# Patient Record
Sex: MALE | Race: WHITE | Employment: OTHER | URBAN - METROPOLITAN AREA
[De-identification: names, ages, dates, MRNs, and addresses within clinical notes are randomized per-mention and may not be internally consistent; named-entity substitution may affect disease eponyms.]

---

## 2017-01-03 ENCOUNTER — HOME HEALTH ADMISSION (OUTPATIENT)
Dept: HOME HEALTH SERVICES | Facility: HOME HEALTH | Age: 82
End: 2017-01-03
Payer: MEDICARE

## 2017-01-05 ENCOUNTER — HOSPITAL ENCOUNTER (OUTPATIENT)
Dept: LAB | Age: 82
Discharge: HOME | End: 2017-01-05

## 2017-01-05 LAB
INR PPP: 3.2 (ref 0.9–1.2)
PROTHROMBIN TIME: 35.6 SEC (ref 9.6–12)

## 2017-01-05 PROCEDURE — 85610 PROTHROMBIN TIME: CPT | Performed by: INTERNAL MEDICINE

## 2017-01-10 ENCOUNTER — HOME CARE VISIT (OUTPATIENT)
Dept: SCHEDULING | Facility: HOME HEALTH | Age: 82
End: 2017-01-10
Payer: MEDICARE

## 2017-01-10 PROCEDURE — 400013 HH SOC

## 2017-01-10 PROCEDURE — G0299 HHS/HOSPICE OF RN EA 15 MIN: HCPCS

## 2017-01-10 PROCEDURE — 3331090001 HH PPS REVENUE CREDIT

## 2017-01-10 PROCEDURE — 3331090002 HH PPS REVENUE DEBIT

## 2017-01-11 PROCEDURE — 3331090002 HH PPS REVENUE DEBIT

## 2017-01-11 PROCEDURE — 3331090001 HH PPS REVENUE CREDIT

## 2017-01-12 VITALS
TEMPERATURE: 97.6 F | BODY MASS INDEX: 38.8 KG/M2 | OXYGEN SATURATION: 97 % | WEIGHT: 256 LBS | HEART RATE: 100 BPM | HEIGHT: 68 IN | SYSTOLIC BLOOD PRESSURE: 120 MMHG | DIASTOLIC BLOOD PRESSURE: 58 MMHG

## 2017-01-12 PROCEDURE — 3331090002 HH PPS REVENUE DEBIT

## 2017-01-12 PROCEDURE — 3331090001 HH PPS REVENUE CREDIT

## 2017-01-13 ENCOUNTER — PATIENT OUTREACH (OUTPATIENT)
Dept: CASE MANAGEMENT | Age: 82
End: 2017-01-13

## 2017-01-13 ENCOUNTER — HOME CARE VISIT (OUTPATIENT)
Dept: SCHEDULING | Facility: HOME HEALTH | Age: 82
End: 2017-01-13
Payer: MEDICARE

## 2017-01-13 PROCEDURE — G0299 HHS/HOSPICE OF RN EA 15 MIN: HCPCS

## 2017-01-13 PROCEDURE — 3331090002 HH PPS REVENUE DEBIT

## 2017-01-13 PROCEDURE — 3331090001 HH PPS REVENUE CREDIT

## 2017-01-13 NOTE — PROGRESS NOTES
[facility] outreach. Spoke with patient who asked me to speak with his private aid. Private Aide reports she is there with patient daily for 4-8 hours. Aide reported [facility] has visited once this week and is unsure of how often they plan to visit. She gave me a family member's contact information who handles his appointments and medications. Family member is Ms. Donny Gan [phone]. Aide unaware of her full name. [facility] called Ms. Donny Gan unable to reach. Left contact information requesting a return call. This note will not be viewable in [address]

## 2017-01-14 PROCEDURE — 3331090001 HH PPS REVENUE CREDIT

## 2017-01-14 PROCEDURE — 3331090002 HH PPS REVENUE DEBIT

## 2017-01-15 PROCEDURE — 3331090002 HH PPS REVENUE DEBIT

## 2017-01-15 PROCEDURE — 3331090001 HH PPS REVENUE CREDIT

## 2017-01-16 ENCOUNTER — PATIENT OUTREACH (OUTPATIENT)
Dept: CASE MANAGEMENT | Age: 82
End: 2017-01-16

## 2017-01-16 VITALS
DIASTOLIC BLOOD PRESSURE: 60 MMHG | SYSTOLIC BLOOD PRESSURE: 110 MMHG | TEMPERATURE: 96 F | HEART RATE: 91 BPM | OXYGEN SATURATION: 93 %

## 2017-01-16 PROCEDURE — 3331090001 HH PPS REVENUE CREDIT

## 2017-01-16 PROCEDURE — 3331090002 HH PPS REVENUE DEBIT

## 2017-01-16 NOTE — PROGRESS NOTES
Received return call from patient's niece Dahlia Roscoe. [facility] nursing scheduled 2 times weekly. PT/OT scheduled for 1/17/17 for evaluation. Niece reports she purchased his shower chair at a consignment shop and is concerned if it is appropriate or safe for family to assist with bathing. [facility] spoke with [facility] at [address] regarding the shower chair. [place] to inform PT, of [place] family's concern. Follow up appointment scheduled with PCP 2/9/17. Transportation arranged. Patient's niece stated that [facility] nurse is checking PT/INR and reporting results to PCP. Niece to call CCM with any issues/concerns. This note will not be viewable in [address]

## 2017-01-17 ENCOUNTER — HOME CARE VISIT (OUTPATIENT)
Dept: SCHEDULING | Facility: HOME HEALTH | Age: 82
End: 2017-01-17
Payer: MEDICARE

## 2017-01-17 VITALS
HEIGHT: 68 IN | HEART RATE: 92 BPM | DIASTOLIC BLOOD PRESSURE: 71 MMHG | RESPIRATION RATE: 16 BRPM | SYSTOLIC BLOOD PRESSURE: 119 MMHG

## 2017-01-17 PROCEDURE — 3331090002 HH PPS REVENUE DEBIT

## 2017-01-17 PROCEDURE — G0299 HHS/HOSPICE OF RN EA 15 MIN: HCPCS

## 2017-01-17 PROCEDURE — 3331090001 HH PPS REVENUE CREDIT

## 2017-01-17 PROCEDURE — G0151 HHCP-SERV OF PT,EA 15 MIN: HCPCS

## 2017-01-18 ENCOUNTER — HOME CARE VISIT (OUTPATIENT)
Dept: HOME HEALTH SERVICES | Facility: HOME HEALTH | Age: 82
End: 2017-01-18
Payer: MEDICARE

## 2017-01-18 VITALS
TEMPERATURE: 95.6 F | DIASTOLIC BLOOD PRESSURE: 60 MMHG | SYSTOLIC BLOOD PRESSURE: 115 MMHG | RESPIRATION RATE: 18 BRPM | OXYGEN SATURATION: 98 % | HEART RATE: 99 BPM

## 2017-01-18 PROCEDURE — 3331090002 HH PPS REVENUE DEBIT

## 2017-01-18 PROCEDURE — 3331090001 HH PPS REVENUE CREDIT

## 2017-01-19 ENCOUNTER — PATIENT OUTREACH (OUTPATIENT)
Dept: CASE MANAGEMENT | Age: 82
End: 2017-01-19

## 2017-01-19 PROCEDURE — 3331090002 HH PPS REVENUE DEBIT

## 2017-01-19 PROCEDURE — 3331090001 HH PPS REVENUE CREDIT

## 2017-01-19 NOTE — PROGRESS NOTES
CCM follow up call attempted. Unable to reach patient. Left message with niece requesting a return call. This note will not be viewable in [address]

## 2017-01-20 ENCOUNTER — HOME CARE VISIT (OUTPATIENT)
Dept: SCHEDULING | Facility: HOME HEALTH | Age: 82
End: 2017-01-20
Payer: MEDICARE

## 2017-01-20 ENCOUNTER — HOME CARE VISIT (OUTPATIENT)
Dept: HOME HEALTH SERVICES | Facility: HOME HEALTH | Age: 82
End: 2017-01-20
Payer: MEDICARE

## 2017-01-20 VITALS
SYSTOLIC BLOOD PRESSURE: 126 MMHG | TEMPERATURE: 97.8 F | RESPIRATION RATE: 18 BRPM | HEART RATE: 72 BPM | DIASTOLIC BLOOD PRESSURE: 70 MMHG

## 2017-01-20 VITALS
HEART RATE: 98 BPM | DIASTOLIC BLOOD PRESSURE: 82 MMHG | RESPIRATION RATE: 16 BRPM | OXYGEN SATURATION: 98 % | SYSTOLIC BLOOD PRESSURE: 146 MMHG

## 2017-01-20 PROCEDURE — 3331090002 HH PPS REVENUE DEBIT

## 2017-01-20 PROCEDURE — G0157 HHC PT ASSISTANT EA 15: HCPCS

## 2017-01-20 PROCEDURE — G0152 HHCP-SERV OF OT,EA 15 MIN: HCPCS

## 2017-01-20 PROCEDURE — 3331090001 HH PPS REVENUE CREDIT

## 2017-01-20 PROCEDURE — G0299 HHS/HOSPICE OF RN EA 15 MIN: HCPCS

## 2017-01-21 PROCEDURE — 3331090001 HH PPS REVENUE CREDIT

## 2017-01-21 PROCEDURE — 3331090002 HH PPS REVENUE DEBIT

## 2017-01-22 PROCEDURE — 3331090001 HH PPS REVENUE CREDIT

## 2017-01-22 PROCEDURE — 3331090002 HH PPS REVENUE DEBIT

## 2017-01-23 ENCOUNTER — HOME CARE VISIT (OUTPATIENT)
Dept: SCHEDULING | Facility: HOME HEALTH | Age: 82
End: 2017-01-23
Payer: MEDICARE

## 2017-01-23 ENCOUNTER — PATIENT OUTREACH (OUTPATIENT)
Dept: CASE MANAGEMENT | Age: 82
End: 2017-01-23

## 2017-01-23 ENCOUNTER — HOME CARE VISIT (OUTPATIENT)
Dept: HOME HEALTH SERVICES | Facility: HOME HEALTH | Age: 82
End: 2017-01-23
Payer: MEDICARE

## 2017-01-23 VITALS
HEART RATE: 110 BPM | RESPIRATION RATE: 18 BRPM | OXYGEN SATURATION: 97 % | DIASTOLIC BLOOD PRESSURE: 62 MMHG | TEMPERATURE: 96.4 F | SYSTOLIC BLOOD PRESSURE: 114 MMHG

## 2017-01-23 VITALS
HEART RATE: 110 BPM | OXYGEN SATURATION: 97 % | DIASTOLIC BLOOD PRESSURE: 62 MMHG | SYSTOLIC BLOOD PRESSURE: 110 MMHG | RESPIRATION RATE: 18 BRPM | TEMPERATURE: 96.4 F

## 2017-01-23 PROCEDURE — 3331090002 HH PPS REVENUE DEBIT

## 2017-01-23 PROCEDURE — G0299 HHS/HOSPICE OF RN EA 15 MIN: HCPCS

## 2017-01-23 PROCEDURE — 3331090001 HH PPS REVENUE CREDIT

## 2017-01-23 PROCEDURE — G0158 HHC OT ASSISTANT EA 15: HCPCS

## 2017-01-23 NOTE — PROGRESS NOTES
CCM follow up call. Unable to reach patient. Left message with niece requesting a return call. This note will not be viewable in [address]

## 2017-01-24 ENCOUNTER — HOME CARE VISIT (OUTPATIENT)
Dept: HOME HEALTH SERVICES | Facility: HOME HEALTH | Age: 82
End: 2017-01-24
Payer: MEDICARE

## 2017-01-24 PROCEDURE — 3331090002 HH PPS REVENUE DEBIT

## 2017-01-24 PROCEDURE — 3331090001 HH PPS REVENUE CREDIT

## 2017-01-25 ENCOUNTER — HOME CARE VISIT (OUTPATIENT)
Dept: SCHEDULING | Facility: HOME HEALTH | Age: 82
End: 2017-01-25
Payer: MEDICARE

## 2017-01-25 ENCOUNTER — PATIENT OUTREACH (OUTPATIENT)
Dept: CASE MANAGEMENT | Age: 82
End: 2017-01-25

## 2017-01-25 VITALS
OXYGEN SATURATION: 98 % | TEMPERATURE: 95.1 F | HEART RATE: 102 BPM | SYSTOLIC BLOOD PRESSURE: 122 MMHG | RESPIRATION RATE: 18 BRPM | DIASTOLIC BLOOD PRESSURE: 76 MMHG

## 2017-01-25 PROCEDURE — 3331090002 HH PPS REVENUE DEBIT

## 2017-01-25 PROCEDURE — G0157 HHC PT ASSISTANT EA 15: HCPCS

## 2017-01-25 PROCEDURE — G0158 HHC OT ASSISTANT EA 15: HCPCS

## 2017-01-25 PROCEDURE — 3331090001 HH PPS REVENUE CREDIT

## 2017-01-25 NOTE — PROGRESS NOTES
CCM follow up call. Spoke with patient's niece, Martin Salcedo. Monae Mckeon reports patient seems to be improving daily. [facility] nursing and PT continue 2 times weekly. Appointment with PCP on 2/9/17. No issues/concerns at present time. Family will call CCM with any identified needs. This note will not be viewable in [address]

## 2017-01-26 VITALS
RESPIRATION RATE: 18 BRPM | SYSTOLIC BLOOD PRESSURE: 136 MMHG | TEMPERATURE: 97.8 F | HEART RATE: 78 BPM | DIASTOLIC BLOOD PRESSURE: 70 MMHG

## 2017-01-26 PROCEDURE — 3331090002 HH PPS REVENUE DEBIT

## 2017-01-26 PROCEDURE — 3331090001 HH PPS REVENUE CREDIT

## 2017-01-27 ENCOUNTER — HOME CARE VISIT (OUTPATIENT)
Dept: SCHEDULING | Facility: HOME HEALTH | Age: 82
End: 2017-01-27
Payer: MEDICARE

## 2017-01-27 PROCEDURE — 3331090002 HH PPS REVENUE DEBIT

## 2017-01-27 PROCEDURE — 3331090001 HH PPS REVENUE CREDIT

## 2017-01-28 PROCEDURE — 3331090002 HH PPS REVENUE DEBIT

## 2017-01-28 PROCEDURE — 3331090001 HH PPS REVENUE CREDIT

## 2017-01-29 PROCEDURE — 3331090002 HH PPS REVENUE DEBIT

## 2017-01-29 PROCEDURE — 3331090001 HH PPS REVENUE CREDIT

## 2017-01-30 ENCOUNTER — HOME CARE VISIT (OUTPATIENT)
Dept: SCHEDULING | Facility: HOME HEALTH | Age: 82
End: 2017-01-30
Payer: MEDICARE

## 2017-01-30 VITALS
SYSTOLIC BLOOD PRESSURE: 136 MMHG | TEMPERATURE: 96.9 F | RESPIRATION RATE: 18 BRPM | HEART RATE: 84 BPM | DIASTOLIC BLOOD PRESSURE: 66 MMHG

## 2017-01-30 PROCEDURE — G0157 HHC PT ASSISTANT EA 15: HCPCS

## 2017-01-30 PROCEDURE — 3331090002 HH PPS REVENUE DEBIT

## 2017-01-30 PROCEDURE — 3331090001 HH PPS REVENUE CREDIT

## 2017-01-31 ENCOUNTER — HOME CARE VISIT (OUTPATIENT)
Dept: SCHEDULING | Facility: HOME HEALTH | Age: 82
End: 2017-01-31
Payer: MEDICARE

## 2017-01-31 VITALS
HEART RATE: 98 BPM | TEMPERATURE: 97.2 F | OXYGEN SATURATION: 98 % | SYSTOLIC BLOOD PRESSURE: 140 MMHG | DIASTOLIC BLOOD PRESSURE: 84 MMHG | RESPIRATION RATE: 18 BRPM

## 2017-01-31 LAB
INR BLD: 3.8 (ref 0.9–1.1)
PT POC: 46.1 SEC

## 2017-01-31 PROCEDURE — G0158 HHC OT ASSISTANT EA 15: HCPCS

## 2017-01-31 PROCEDURE — 3331090001 HH PPS REVENUE CREDIT

## 2017-01-31 PROCEDURE — G0299 HHS/HOSPICE OF RN EA 15 MIN: HCPCS

## 2017-01-31 PROCEDURE — 3331090002 HH PPS REVENUE DEBIT

## 2017-02-01 ENCOUNTER — HOME CARE VISIT (OUTPATIENT)
Dept: SCHEDULING | Facility: HOME HEALTH | Age: 82
End: 2017-02-01
Payer: MEDICARE

## 2017-02-01 ENCOUNTER — PATIENT OUTREACH (OUTPATIENT)
Dept: CASE MANAGEMENT | Age: 82
End: 2017-02-01

## 2017-02-01 VITALS
HEART RATE: 102 BPM | SYSTOLIC BLOOD PRESSURE: 118 MMHG | RESPIRATION RATE: 16 BRPM | TEMPERATURE: 96.1 F | OXYGEN SATURATION: 98 % | DIASTOLIC BLOOD PRESSURE: 74 MMHG

## 2017-02-01 PROCEDURE — 3331090002 HH PPS REVENUE DEBIT

## 2017-02-01 PROCEDURE — A4310 INSERT TRAY W/O BAG/CATH: HCPCS

## 2017-02-01 PROCEDURE — A4357 BEDSIDE DRAINAGE BAG: HCPCS

## 2017-02-01 PROCEDURE — 3331090001 HH PPS REVENUE CREDIT

## 2017-02-01 PROCEDURE — A4338 INDWELLING CATHETER LATEX: HCPCS

## 2017-02-01 NOTE — PROGRESS NOTES
CCM follow up call. Unable to reach patient initially. Rosa returned call. She reports patient is doing well. Continues to receive home health services. Therapy sessions placed on hold this week due to increased INR. Medication adjusted with labs to be redrawn 2/6/17. Therapy will resume once INR is within normal range. Rosa to call CCM with any issues/concerns. This note will not be viewable in [address]

## 2017-02-02 PROCEDURE — 3331090001 HH PPS REVENUE CREDIT

## 2017-02-02 PROCEDURE — 3331090002 HH PPS REVENUE DEBIT

## 2017-02-03 ENCOUNTER — HOME CARE VISIT (OUTPATIENT)
Dept: HOME HEALTH SERVICES | Facility: HOME HEALTH | Age: 82
End: 2017-02-03
Payer: MEDICARE

## 2017-02-03 ENCOUNTER — HOME CARE VISIT (OUTPATIENT)
Dept: SCHEDULING | Facility: HOME HEALTH | Age: 82
End: 2017-02-03
Payer: MEDICARE

## 2017-02-03 VITALS
TEMPERATURE: 97.2 F | SYSTOLIC BLOOD PRESSURE: 148 MMHG | HEART RATE: 104 BPM | DIASTOLIC BLOOD PRESSURE: 84 MMHG | OXYGEN SATURATION: 98 % | RESPIRATION RATE: 16 BRPM

## 2017-02-03 PROCEDURE — G0299 HHS/HOSPICE OF RN EA 15 MIN: HCPCS

## 2017-02-03 PROCEDURE — 3331090001 HH PPS REVENUE CREDIT

## 2017-02-03 PROCEDURE — 3331090002 HH PPS REVENUE DEBIT

## 2017-02-04 PROCEDURE — 3331090002 HH PPS REVENUE DEBIT

## 2017-02-04 PROCEDURE — 3331090001 HH PPS REVENUE CREDIT

## 2017-02-05 PROCEDURE — 3331090001 HH PPS REVENUE CREDIT

## 2017-02-05 PROCEDURE — 3331090002 HH PPS REVENUE DEBIT

## 2017-02-06 ENCOUNTER — HOME CARE VISIT (OUTPATIENT)
Dept: SCHEDULING | Facility: HOME HEALTH | Age: 82
End: 2017-02-06
Payer: MEDICARE

## 2017-02-06 VITALS
DIASTOLIC BLOOD PRESSURE: 78 MMHG | OXYGEN SATURATION: 98 % | HEART RATE: 91 BPM | RESPIRATION RATE: 16 BRPM | TEMPERATURE: 97.1 F | SYSTOLIC BLOOD PRESSURE: 124 MMHG

## 2017-02-06 LAB
INR BLD: 2.1 (ref 0.9–1.1)
PT POC: 24.7 SEC

## 2017-02-06 PROCEDURE — 3331090002 HH PPS REVENUE DEBIT

## 2017-02-06 PROCEDURE — G0299 HHS/HOSPICE OF RN EA 15 MIN: HCPCS

## 2017-02-06 PROCEDURE — 3331090001 HH PPS REVENUE CREDIT

## 2017-02-07 ENCOUNTER — HOME CARE VISIT (OUTPATIENT)
Dept: SCHEDULING | Facility: HOME HEALTH | Age: 82
End: 2017-02-07
Payer: MEDICARE

## 2017-02-07 VITALS
SYSTOLIC BLOOD PRESSURE: 126 MMHG | TEMPERATURE: 95.5 F | HEART RATE: 78 BPM | DIASTOLIC BLOOD PRESSURE: 64 MMHG | OXYGEN SATURATION: 98 % | RESPIRATION RATE: 18 BRPM

## 2017-02-07 PROCEDURE — 3331090001 HH PPS REVENUE CREDIT

## 2017-02-07 PROCEDURE — 3331090002 HH PPS REVENUE DEBIT

## 2017-02-07 PROCEDURE — G0152 HHCP-SERV OF OT,EA 15 MIN: HCPCS

## 2017-02-08 ENCOUNTER — PATIENT OUTREACH (OUTPATIENT)
Dept: CASE MANAGEMENT | Age: 82
End: 2017-02-08

## 2017-02-08 ENCOUNTER — HOME CARE VISIT (OUTPATIENT)
Dept: SCHEDULING | Facility: HOME HEALTH | Age: 82
End: 2017-02-08
Payer: MEDICARE

## 2017-02-08 VITALS
RESPIRATION RATE: 18 BRPM | TEMPERATURE: 96.8 F | HEART RATE: 84 BPM | SYSTOLIC BLOOD PRESSURE: 130 MMHG | DIASTOLIC BLOOD PRESSURE: 76 MMHG

## 2017-02-08 PROCEDURE — 3331090002 HH PPS REVENUE DEBIT

## 2017-02-08 PROCEDURE — 3331090001 HH PPS REVENUE CREDIT

## 2017-02-08 PROCEDURE — G0157 HHC PT ASSISTANT EA 15: HCPCS

## 2017-02-08 NOTE — PROGRESS NOTES
CCM follow up call. Unable to reach patient. Left message requesting a return call. This note will not be viewable in [address]

## 2017-02-09 PROCEDURE — 3331090002 HH PPS REVENUE DEBIT

## 2017-02-09 PROCEDURE — 3331090001 HH PPS REVENUE CREDIT

## 2017-02-10 ENCOUNTER — HOME CARE VISIT (OUTPATIENT)
Dept: SCHEDULING | Facility: HOME HEALTH | Age: 82
End: 2017-02-10
Payer: MEDICARE

## 2017-02-10 ENCOUNTER — HOSPITAL ENCOUNTER (OUTPATIENT)
Dept: LAB | Age: 82
Discharge: HOME | End: 2017-02-10
Attending: FAMILY MEDICINE
Payer: MEDICARE

## 2017-02-10 VITALS
DIASTOLIC BLOOD PRESSURE: 80 MMHG | SYSTOLIC BLOOD PRESSURE: 120 MMHG | RESPIRATION RATE: 16 BRPM | OXYGEN SATURATION: 98 % | TEMPERATURE: 97.4 F | HEART RATE: 80 BPM

## 2017-02-10 VITALS — HEART RATE: 98 BPM | SYSTOLIC BLOOD PRESSURE: 142 MMHG | RESPIRATION RATE: 16 BRPM | DIASTOLIC BLOOD PRESSURE: 90 MMHG

## 2017-02-10 PROCEDURE — 3331090002 HH PPS REVENUE DEBIT

## 2017-02-10 PROCEDURE — G0151 HHCP-SERV OF PT,EA 15 MIN: HCPCS

## 2017-02-10 PROCEDURE — 87086 URINE CULTURE/COLONY COUNT: CPT | Performed by: FAMILY MEDICINE

## 2017-02-10 PROCEDURE — G0299 HHS/HOSPICE OF RN EA 15 MIN: HCPCS

## 2017-02-10 PROCEDURE — 87088 URINE BACTERIA CULTURE: CPT | Performed by: FAMILY MEDICINE

## 2017-02-10 PROCEDURE — 3331090001 HH PPS REVENUE CREDIT

## 2017-02-10 PROCEDURE — 87186 SC STD MICRODIL/AGAR DIL: CPT | Performed by: FAMILY MEDICINE

## 2017-02-11 PROCEDURE — 3331090002 HH PPS REVENUE DEBIT

## 2017-02-11 PROCEDURE — 3331090001 HH PPS REVENUE CREDIT

## 2017-02-12 LAB
BACTERIA SPEC CULT: ABNORMAL
SERVICE CMNT-IMP: ABNORMAL

## 2017-02-12 PROCEDURE — 3331090002 HH PPS REVENUE DEBIT

## 2017-02-12 PROCEDURE — 3331090001 HH PPS REVENUE CREDIT

## 2017-02-13 ENCOUNTER — PATIENT OUTREACH (OUTPATIENT)
Dept: CASE MANAGEMENT | Age: 82
End: 2017-02-13

## 2017-02-13 ENCOUNTER — HOME CARE VISIT (OUTPATIENT)
Dept: SCHEDULING | Facility: HOME HEALTH | Age: 82
End: 2017-02-13
Payer: MEDICARE

## 2017-02-13 VITALS
HEART RATE: 97 BPM | RESPIRATION RATE: 16 BRPM | OXYGEN SATURATION: 98 % | SYSTOLIC BLOOD PRESSURE: 119 MMHG | TEMPERATURE: 96 F | DIASTOLIC BLOOD PRESSURE: 70 MMHG

## 2017-02-13 LAB
INR BLD: 2.4 (ref 0.9–1.1)
PT POC: 28.2 SEC

## 2017-02-13 PROCEDURE — 3331090001 HH PPS REVENUE CREDIT

## 2017-02-13 PROCEDURE — G0299 HHS/HOSPICE OF RN EA 15 MIN: HCPCS

## 2017-02-13 PROCEDURE — 3331090002 HH PPS REVENUE DEBIT

## 2017-02-13 NOTE — PROGRESS NOTES
CCM follow up call. Unable to reach patient. Left message with niece, Crane Mariotto requesting a return call. This note will not be viewable in [address]

## 2017-02-14 VITALS
TEMPERATURE: 96.6 F | HEART RATE: 78 BPM | DIASTOLIC BLOOD PRESSURE: 62 MMHG | SYSTOLIC BLOOD PRESSURE: 114 MMHG | OXYGEN SATURATION: 98 % | RESPIRATION RATE: 16 BRPM

## 2017-02-14 PROCEDURE — 3331090001 HH PPS REVENUE CREDIT

## 2017-02-14 PROCEDURE — 3331090002 HH PPS REVENUE DEBIT

## 2017-02-15 PROCEDURE — 3331090002 HH PPS REVENUE DEBIT

## 2017-02-15 PROCEDURE — 3331090001 HH PPS REVENUE CREDIT

## 2017-02-16 ENCOUNTER — HOME CARE VISIT (OUTPATIENT)
Dept: SCHEDULING | Facility: HOME HEALTH | Age: 82
End: 2017-02-16
Payer: MEDICARE

## 2017-02-16 PROCEDURE — 3331090002 HH PPS REVENUE DEBIT

## 2017-02-16 PROCEDURE — A4213 20+ CC SYRINGE ONLY: HCPCS

## 2017-02-16 PROCEDURE — A4338 INDWELLING CATHETER LATEX: HCPCS

## 2017-02-16 PROCEDURE — A4649 SURGICAL SUPPLIES: HCPCS

## 2017-02-16 PROCEDURE — A4357 BEDSIDE DRAINAGE BAG: HCPCS

## 2017-02-16 PROCEDURE — A4310 INSERT TRAY W/O BAG/CATH: HCPCS

## 2017-02-16 PROCEDURE — G0299 HHS/HOSPICE OF RN EA 15 MIN: HCPCS

## 2017-02-16 PROCEDURE — 3331090001 HH PPS REVENUE CREDIT

## 2017-02-17 PROCEDURE — 3331090002 HH PPS REVENUE DEBIT

## 2017-02-17 PROCEDURE — 3331090001 HH PPS REVENUE CREDIT

## 2017-02-18 PROCEDURE — 3331090002 HH PPS REVENUE DEBIT

## 2017-02-18 PROCEDURE — 3331090001 HH PPS REVENUE CREDIT

## 2017-02-19 PROCEDURE — 3331090002 HH PPS REVENUE DEBIT

## 2017-02-19 PROCEDURE — 3331090001 HH PPS REVENUE CREDIT

## 2017-02-20 ENCOUNTER — PATIENT OUTREACH (OUTPATIENT)
Dept: CASE MANAGEMENT | Age: 82
End: 2017-02-20

## 2017-02-20 VITALS
TEMPERATURE: 97.4 F | HEART RATE: 76 BPM | SYSTOLIC BLOOD PRESSURE: 107 MMHG | RESPIRATION RATE: 20 BRPM | DIASTOLIC BLOOD PRESSURE: 60 MMHG | OXYGEN SATURATION: 99 %

## 2017-02-20 PROCEDURE — 3331090001 HH PPS REVENUE CREDIT

## 2017-02-20 PROCEDURE — 3331090002 HH PPS REVENUE DEBIT

## 2017-02-20 NOTE — PROGRESS NOTES
[facility] follow up call. Unable to reach patient. Left 2nd message with patient's niece requesting a return call. This note will not be viewable in [address]

## 2017-02-21 PROCEDURE — 3331090001 HH PPS REVENUE CREDIT

## 2017-02-21 PROCEDURE — 3331090002 HH PPS REVENUE DEBIT

## 2017-02-22 PROCEDURE — 3331090002 HH PPS REVENUE DEBIT

## 2017-02-22 PROCEDURE — 3331090001 HH PPS REVENUE CREDIT

## 2017-02-23 ENCOUNTER — PATIENT OUTREACH (OUTPATIENT)
Dept: CASE MANAGEMENT | Age: 82
End: 2017-02-23

## 2017-02-23 PROCEDURE — 3331090001 HH PPS REVENUE CREDIT

## 2017-02-23 PROCEDURE — 3331090002 HH PPS REVENUE DEBIT

## 2017-02-23 NOTE — PROGRESS NOTES
CCM follow up attempted for the third time. Left message with niece with no success of return calls. Episode resolved. This note will not be viewable in [address]

## 2017-02-24 PROCEDURE — 3331090001 HH PPS REVENUE CREDIT

## 2017-02-24 PROCEDURE — 3331090002 HH PPS REVENUE DEBIT

## 2017-02-25 PROCEDURE — 3331090001 HH PPS REVENUE CREDIT

## 2017-02-25 PROCEDURE — 3331090002 HH PPS REVENUE DEBIT

## 2017-02-26 PROCEDURE — 3331090002 HH PPS REVENUE DEBIT

## 2017-02-26 PROCEDURE — 3331090001 HH PPS REVENUE CREDIT

## 2017-02-27 PROCEDURE — 3331090001 HH PPS REVENUE CREDIT

## 2017-02-27 PROCEDURE — 3331090002 HH PPS REVENUE DEBIT

## 2017-02-28 PROCEDURE — 3331090001 HH PPS REVENUE CREDIT

## 2017-02-28 PROCEDURE — 3331090002 HH PPS REVENUE DEBIT

## 2017-03-01 PROCEDURE — 3331090001 HH PPS REVENUE CREDIT

## 2017-03-01 PROCEDURE — 3331090002 HH PPS REVENUE DEBIT

## 2017-03-02 PROCEDURE — 3331090002 HH PPS REVENUE DEBIT

## 2017-03-02 PROCEDURE — 3331090001 HH PPS REVENUE CREDIT

## 2017-03-03 ENCOUNTER — HOME CARE VISIT (OUTPATIENT)
Dept: SCHEDULING | Facility: HOME HEALTH | Age: 82
End: 2017-03-03
Payer: MEDICARE

## 2017-03-03 PROCEDURE — G0299 HHS/HOSPICE OF RN EA 15 MIN: HCPCS

## 2017-03-03 PROCEDURE — 3331090001 HH PPS REVENUE CREDIT

## 2017-03-03 PROCEDURE — 3331090002 HH PPS REVENUE DEBIT

## 2017-03-04 PROCEDURE — 3331090001 HH PPS REVENUE CREDIT

## 2017-03-04 PROCEDURE — 3331090002 HH PPS REVENUE DEBIT

## 2017-03-05 PROCEDURE — 3331090002 HH PPS REVENUE DEBIT

## 2017-03-05 PROCEDURE — 3331090001 HH PPS REVENUE CREDIT

## 2017-03-06 VITALS
TEMPERATURE: 96 F | SYSTOLIC BLOOD PRESSURE: 128 MMHG | RESPIRATION RATE: 16 BRPM | OXYGEN SATURATION: 98 % | HEART RATE: 76 BPM | DIASTOLIC BLOOD PRESSURE: 72 MMHG

## 2017-03-06 PROCEDURE — 3331090001 HH PPS REVENUE CREDIT

## 2017-03-06 PROCEDURE — 3331090002 HH PPS REVENUE DEBIT

## 2017-03-07 PROCEDURE — 3331090001 HH PPS REVENUE CREDIT

## 2017-03-07 PROCEDURE — 3331090002 HH PPS REVENUE DEBIT

## 2017-03-08 ENCOUNTER — HOME CARE VISIT (OUTPATIENT)
Dept: SCHEDULING | Facility: HOME HEALTH | Age: 82
End: 2017-03-08
Payer: MEDICARE

## 2017-03-08 PROCEDURE — 400014 HH F/U

## 2017-03-08 PROCEDURE — 3331090001 HH PPS REVENUE CREDIT

## 2017-03-08 PROCEDURE — 3331090002 HH PPS REVENUE DEBIT

## 2017-03-09 VITALS
RESPIRATION RATE: 19 BRPM | OXYGEN SATURATION: 98 % | HEART RATE: 77 BPM | DIASTOLIC BLOOD PRESSURE: 64 MMHG | SYSTOLIC BLOOD PRESSURE: 140 MMHG | BODY MASS INDEX: 30.11 KG/M2 | TEMPERATURE: 96 F | WEIGHT: 198 LBS

## 2017-03-09 PROCEDURE — 3331090002 HH PPS REVENUE DEBIT

## 2017-03-09 PROCEDURE — 3331090001 HH PPS REVENUE CREDIT

## 2017-03-10 PROCEDURE — 3331090001 HH PPS REVENUE CREDIT

## 2017-03-10 PROCEDURE — 3331090002 HH PPS REVENUE DEBIT

## 2017-03-10 PROCEDURE — 3331090003 HH PPS REVENUE ADJ

## 2017-03-11 PROCEDURE — 3331090002 HH PPS REVENUE DEBIT

## 2017-03-11 PROCEDURE — 3331090001 HH PPS REVENUE CREDIT

## 2017-03-12 PROCEDURE — 3331090001 HH PPS REVENUE CREDIT

## 2017-03-12 PROCEDURE — 3331090002 HH PPS REVENUE DEBIT

## 2017-03-13 PROCEDURE — 3331090001 HH PPS REVENUE CREDIT

## 2017-03-13 PROCEDURE — 3331090002 HH PPS REVENUE DEBIT

## 2017-03-14 PROCEDURE — 3331090001 HH PPS REVENUE CREDIT

## 2017-03-14 PROCEDURE — 3331090002 HH PPS REVENUE DEBIT

## 2017-03-15 PROCEDURE — 3331090001 HH PPS REVENUE CREDIT

## 2017-03-15 PROCEDURE — 3331090002 HH PPS REVENUE DEBIT

## 2017-03-16 PROCEDURE — 3331090002 HH PPS REVENUE DEBIT

## 2017-03-16 PROCEDURE — 3331090001 HH PPS REVENUE CREDIT

## 2017-03-17 PROCEDURE — 3331090001 HH PPS REVENUE CREDIT

## 2017-03-17 PROCEDURE — 3331090002 HH PPS REVENUE DEBIT

## 2017-03-18 PROCEDURE — 3331090002 HH PPS REVENUE DEBIT

## 2017-03-18 PROCEDURE — 3331090001 HH PPS REVENUE CREDIT

## 2017-03-19 PROCEDURE — 3331090001 HH PPS REVENUE CREDIT

## 2017-03-19 PROCEDURE — 3331090002 HH PPS REVENUE DEBIT

## 2017-03-20 PROCEDURE — 3331090001 HH PPS REVENUE CREDIT

## 2017-03-20 PROCEDURE — 3331090002 HH PPS REVENUE DEBIT

## 2017-03-21 PROCEDURE — 3331090002 HH PPS REVENUE DEBIT

## 2017-03-21 PROCEDURE — 3331090001 HH PPS REVENUE CREDIT

## 2017-03-22 PROCEDURE — 3331090001 HH PPS REVENUE CREDIT

## 2017-03-22 PROCEDURE — 3331090002 HH PPS REVENUE DEBIT

## 2017-03-23 PROCEDURE — 3331090002 HH PPS REVENUE DEBIT

## 2017-03-23 PROCEDURE — 3331090001 HH PPS REVENUE CREDIT

## 2017-03-24 PROCEDURE — 3331090002 HH PPS REVENUE DEBIT

## 2017-03-24 PROCEDURE — 3331090001 HH PPS REVENUE CREDIT

## 2017-03-25 PROCEDURE — 3331090001 HH PPS REVENUE CREDIT

## 2017-03-25 PROCEDURE — 3331090002 HH PPS REVENUE DEBIT

## 2017-03-26 PROCEDURE — 3331090001 HH PPS REVENUE CREDIT

## 2017-03-26 PROCEDURE — 3331090002 HH PPS REVENUE DEBIT

## 2017-03-27 PROCEDURE — 3331090001 HH PPS REVENUE CREDIT

## 2017-03-27 PROCEDURE — 3331090002 HH PPS REVENUE DEBIT

## 2017-03-28 PROCEDURE — 3331090002 HH PPS REVENUE DEBIT

## 2017-03-28 PROCEDURE — 3331090001 HH PPS REVENUE CREDIT

## 2017-03-29 PROCEDURE — 3331090002 HH PPS REVENUE DEBIT

## 2017-03-29 PROCEDURE — 3331090001 HH PPS REVENUE CREDIT

## 2017-03-30 PROCEDURE — 3331090001 HH PPS REVENUE CREDIT

## 2017-03-30 PROCEDURE — 3331090002 HH PPS REVENUE DEBIT

## 2017-03-31 PROCEDURE — 3331090001 HH PPS REVENUE CREDIT

## 2017-03-31 PROCEDURE — 3331090002 HH PPS REVENUE DEBIT

## 2017-04-01 PROCEDURE — 3331090001 HH PPS REVENUE CREDIT

## 2017-04-01 PROCEDURE — 3331090002 HH PPS REVENUE DEBIT

## 2017-04-02 PROCEDURE — 3331090002 HH PPS REVENUE DEBIT

## 2017-04-02 PROCEDURE — 3331090001 HH PPS REVENUE CREDIT

## 2017-04-03 ENCOUNTER — HOME CARE VISIT (OUTPATIENT)
Dept: SCHEDULING | Facility: HOME HEALTH | Age: 82
End: 2017-04-03
Payer: MEDICARE

## 2017-04-03 PROCEDURE — 3331090002 HH PPS REVENUE DEBIT

## 2017-04-03 PROCEDURE — 400014 HH F/U

## 2017-04-03 PROCEDURE — G0299 HHS/HOSPICE OF RN EA 15 MIN: HCPCS

## 2017-04-03 PROCEDURE — 3331090001 HH PPS REVENUE CREDIT

## 2017-04-04 PROCEDURE — 3331090001 HH PPS REVENUE CREDIT

## 2017-04-04 PROCEDURE — A4338 INDWELLING CATHETER LATEX: HCPCS

## 2017-04-04 PROCEDURE — A4310 INSERT TRAY W/O BAG/CATH: HCPCS

## 2017-04-04 PROCEDURE — A4357 BEDSIDE DRAINAGE BAG: HCPCS

## 2017-04-04 PROCEDURE — A4649 SURGICAL SUPPLIES: HCPCS

## 2017-04-04 PROCEDURE — 3331090002 HH PPS REVENUE DEBIT

## 2017-04-04 PROCEDURE — A4213 20+ CC SYRINGE ONLY: HCPCS

## 2017-04-05 VITALS
HEART RATE: 75 BPM | TEMPERATURE: 97.8 F | SYSTOLIC BLOOD PRESSURE: 130 MMHG | RESPIRATION RATE: 16 BRPM | OXYGEN SATURATION: 98 % | DIASTOLIC BLOOD PRESSURE: 70 MMHG

## 2017-04-05 PROCEDURE — 3331090002 HH PPS REVENUE DEBIT

## 2017-04-05 PROCEDURE — 3331090001 HH PPS REVENUE CREDIT

## 2017-04-06 PROCEDURE — 3331090002 HH PPS REVENUE DEBIT

## 2017-04-06 PROCEDURE — 3331090001 HH PPS REVENUE CREDIT

## 2017-04-07 PROCEDURE — 3331090001 HH PPS REVENUE CREDIT

## 2017-04-07 PROCEDURE — 3331090002 HH PPS REVENUE DEBIT

## 2017-04-08 PROCEDURE — 3331090001 HH PPS REVENUE CREDIT

## 2017-04-08 PROCEDURE — 3331090002 HH PPS REVENUE DEBIT

## 2017-04-09 PROCEDURE — 3331090001 HH PPS REVENUE CREDIT

## 2017-04-09 PROCEDURE — 3331090002 HH PPS REVENUE DEBIT

## 2017-04-10 PROCEDURE — 3331090002 HH PPS REVENUE DEBIT

## 2017-04-10 PROCEDURE — 3331090001 HH PPS REVENUE CREDIT

## 2017-04-11 PROCEDURE — 3331090002 HH PPS REVENUE DEBIT

## 2017-04-11 PROCEDURE — 3331090001 HH PPS REVENUE CREDIT

## 2017-04-12 PROCEDURE — 3331090001 HH PPS REVENUE CREDIT

## 2017-04-12 PROCEDURE — 3331090002 HH PPS REVENUE DEBIT

## 2017-04-13 PROCEDURE — 3331090001 HH PPS REVENUE CREDIT

## 2017-04-13 PROCEDURE — 3331090002 HH PPS REVENUE DEBIT

## 2017-04-14 PROCEDURE — 3331090001 HH PPS REVENUE CREDIT

## 2017-04-14 PROCEDURE — 3331090002 HH PPS REVENUE DEBIT

## 2017-04-15 PROCEDURE — 3331090001 HH PPS REVENUE CREDIT

## 2017-04-15 PROCEDURE — 3331090002 HH PPS REVENUE DEBIT

## 2017-04-16 PROCEDURE — 3331090002 HH PPS REVENUE DEBIT

## 2017-04-16 PROCEDURE — 3331090001 HH PPS REVENUE CREDIT

## 2017-04-17 PROCEDURE — 3331090002 HH PPS REVENUE DEBIT

## 2017-04-17 PROCEDURE — 3331090001 HH PPS REVENUE CREDIT

## 2017-04-18 PROCEDURE — 3331090002 HH PPS REVENUE DEBIT

## 2017-04-18 PROCEDURE — 3331090001 HH PPS REVENUE CREDIT

## 2017-04-19 PROCEDURE — 3331090002 HH PPS REVENUE DEBIT

## 2017-04-19 PROCEDURE — 3331090001 HH PPS REVENUE CREDIT

## 2017-04-20 PROCEDURE — 3331090001 HH PPS REVENUE CREDIT

## 2017-04-20 PROCEDURE — 3331090002 HH PPS REVENUE DEBIT

## 2017-04-21 PROCEDURE — 3331090002 HH PPS REVENUE DEBIT

## 2017-04-21 PROCEDURE — 3331090001 HH PPS REVENUE CREDIT

## 2017-04-22 PROCEDURE — 3331090001 HH PPS REVENUE CREDIT

## 2017-04-22 PROCEDURE — 3331090002 HH PPS REVENUE DEBIT

## 2017-04-23 PROCEDURE — 3331090002 HH PPS REVENUE DEBIT

## 2017-04-23 PROCEDURE — 3331090001 HH PPS REVENUE CREDIT

## 2017-04-24 PROCEDURE — 3331090001 HH PPS REVENUE CREDIT

## 2017-04-24 PROCEDURE — 3331090002 HH PPS REVENUE DEBIT

## 2017-04-25 PROCEDURE — 3331090001 HH PPS REVENUE CREDIT

## 2017-04-25 PROCEDURE — 3331090002 HH PPS REVENUE DEBIT

## 2017-04-26 PROCEDURE — 3331090002 HH PPS REVENUE DEBIT

## 2017-04-26 PROCEDURE — 3331090001 HH PPS REVENUE CREDIT

## 2017-04-27 PROCEDURE — 3331090001 HH PPS REVENUE CREDIT

## 2017-04-27 PROCEDURE — 3331090002 HH PPS REVENUE DEBIT

## 2017-04-28 PROCEDURE — 3331090001 HH PPS REVENUE CREDIT

## 2017-04-28 PROCEDURE — 3331090002 HH PPS REVENUE DEBIT

## 2017-04-29 PROCEDURE — 3331090002 HH PPS REVENUE DEBIT

## 2017-04-29 PROCEDURE — 3331090001 HH PPS REVENUE CREDIT

## 2017-04-30 PROCEDURE — 3331090001 HH PPS REVENUE CREDIT

## 2017-04-30 PROCEDURE — 3331090002 HH PPS REVENUE DEBIT

## 2017-05-01 PROCEDURE — 3331090001 HH PPS REVENUE CREDIT

## 2017-05-01 PROCEDURE — 3331090002 HH PPS REVENUE DEBIT

## 2017-05-02 PROCEDURE — 3331090001 HH PPS REVENUE CREDIT

## 2017-05-02 PROCEDURE — 3331090002 HH PPS REVENUE DEBIT

## 2017-05-03 ENCOUNTER — HOME CARE VISIT (OUTPATIENT)
Dept: SCHEDULING | Facility: HOME HEALTH | Age: 82
End: 2017-05-03
Payer: MEDICARE

## 2017-05-03 VITALS
HEART RATE: 86 BPM | DIASTOLIC BLOOD PRESSURE: 62 MMHG | SYSTOLIC BLOOD PRESSURE: 130 MMHG | OXYGEN SATURATION: 98 % | TEMPERATURE: 96.7 F

## 2017-05-03 PROCEDURE — 3331090002 HH PPS REVENUE DEBIT

## 2017-05-03 PROCEDURE — G0299 HHS/HOSPICE OF RN EA 15 MIN: HCPCS

## 2017-05-03 PROCEDURE — 3331090001 HH PPS REVENUE CREDIT

## 2017-05-04 ENCOUNTER — HOME CARE VISIT (OUTPATIENT)
Dept: HOME HEALTH SERVICES | Facility: HOME HEALTH | Age: 82
End: 2017-05-04
Payer: MEDICARE

## 2017-05-04 PROCEDURE — 3331090002 HH PPS REVENUE DEBIT

## 2017-05-04 PROCEDURE — 3331090001 HH PPS REVENUE CREDIT

## 2017-05-05 PROCEDURE — 3331090002 HH PPS REVENUE DEBIT

## 2017-05-05 PROCEDURE — 3331090001 HH PPS REVENUE CREDIT

## 2017-05-06 PROCEDURE — 3331090002 HH PPS REVENUE DEBIT

## 2017-05-06 PROCEDURE — 3331090003 HH PPS REVENUE ADJ

## 2017-05-06 PROCEDURE — 3331090001 HH PPS REVENUE CREDIT

## 2017-05-07 PROCEDURE — 3331090001 HH PPS REVENUE CREDIT

## 2017-05-07 PROCEDURE — 3331090002 HH PPS REVENUE DEBIT

## 2017-05-08 PROCEDURE — 3331090001 HH PPS REVENUE CREDIT

## 2017-05-08 PROCEDURE — 3331090002 HH PPS REVENUE DEBIT

## 2017-05-09 ENCOUNTER — HOME CARE VISIT (OUTPATIENT)
Dept: SCHEDULING | Facility: HOME HEALTH | Age: 82
End: 2017-05-09
Payer: MEDICARE

## 2017-05-09 PROCEDURE — 3331090001 HH PPS REVENUE CREDIT

## 2017-05-09 PROCEDURE — 3331090002 HH PPS REVENUE DEBIT

## 2017-05-09 PROCEDURE — 3331090003 HH PPS REVENUE ADJ

## 2017-05-10 PROCEDURE — 3331090002 HH PPS REVENUE DEBIT

## 2017-05-10 PROCEDURE — 400014 HH F/U

## 2017-05-10 PROCEDURE — 3331090001 HH PPS REVENUE CREDIT

## 2017-05-11 VITALS
HEART RATE: 72 BPM | RESPIRATION RATE: 16 BRPM | SYSTOLIC BLOOD PRESSURE: 124 MMHG | DIASTOLIC BLOOD PRESSURE: 72 MMHG | TEMPERATURE: 98.7 F | OXYGEN SATURATION: 98 %

## 2017-05-11 PROCEDURE — 3331090002 HH PPS REVENUE DEBIT

## 2017-05-11 PROCEDURE — 3331090001 HH PPS REVENUE CREDIT

## 2017-05-12 PROCEDURE — 3331090002 HH PPS REVENUE DEBIT

## 2017-05-12 PROCEDURE — 3331090001 HH PPS REVENUE CREDIT

## 2017-05-13 PROCEDURE — 3331090002 HH PPS REVENUE DEBIT

## 2017-05-13 PROCEDURE — 3331090001 HH PPS REVENUE CREDIT

## 2017-05-14 PROCEDURE — 3331090001 HH PPS REVENUE CREDIT

## 2017-05-14 PROCEDURE — 3331090002 HH PPS REVENUE DEBIT

## 2017-05-15 PROCEDURE — 3331090001 HH PPS REVENUE CREDIT

## 2017-05-15 PROCEDURE — 3331090002 HH PPS REVENUE DEBIT

## 2017-05-16 PROCEDURE — 3331090002 HH PPS REVENUE DEBIT

## 2017-05-16 PROCEDURE — 3331090001 HH PPS REVENUE CREDIT

## 2017-05-17 PROCEDURE — 3331090001 HH PPS REVENUE CREDIT

## 2017-05-17 PROCEDURE — 3331090002 HH PPS REVENUE DEBIT

## 2017-05-18 PROCEDURE — 3331090001 HH PPS REVENUE CREDIT

## 2017-05-18 PROCEDURE — 3331090002 HH PPS REVENUE DEBIT

## 2017-05-19 PROCEDURE — 3331090001 HH PPS REVENUE CREDIT

## 2017-05-19 PROCEDURE — 3331090002 HH PPS REVENUE DEBIT

## 2017-05-20 PROCEDURE — 3331090002 HH PPS REVENUE DEBIT

## 2017-05-20 PROCEDURE — 3331090001 HH PPS REVENUE CREDIT

## 2017-05-21 PROCEDURE — 3331090002 HH PPS REVENUE DEBIT

## 2017-05-21 PROCEDURE — 3331090001 HH PPS REVENUE CREDIT

## 2017-05-22 PROCEDURE — 3331090002 HH PPS REVENUE DEBIT

## 2017-05-22 PROCEDURE — 3331090001 HH PPS REVENUE CREDIT

## 2017-05-23 PROCEDURE — 3331090001 HH PPS REVENUE CREDIT

## 2017-05-23 PROCEDURE — 3331090002 HH PPS REVENUE DEBIT

## 2017-05-24 PROCEDURE — 3331090001 HH PPS REVENUE CREDIT

## 2017-05-24 PROCEDURE — 3331090002 HH PPS REVENUE DEBIT

## 2017-05-25 PROCEDURE — 3331090002 HH PPS REVENUE DEBIT

## 2017-05-25 PROCEDURE — 3331090001 HH PPS REVENUE CREDIT

## 2017-05-26 PROCEDURE — 3331090001 HH PPS REVENUE CREDIT

## 2017-05-26 PROCEDURE — 3331090002 HH PPS REVENUE DEBIT

## 2017-05-27 PROCEDURE — 3331090002 HH PPS REVENUE DEBIT

## 2017-05-27 PROCEDURE — 3331090001 HH PPS REVENUE CREDIT

## 2017-05-28 PROCEDURE — 3331090001 HH PPS REVENUE CREDIT

## 2017-05-28 PROCEDURE — 3331090002 HH PPS REVENUE DEBIT

## 2017-05-29 PROCEDURE — 3331090001 HH PPS REVENUE CREDIT

## 2017-05-29 PROCEDURE — 3331090002 HH PPS REVENUE DEBIT

## 2017-05-30 PROCEDURE — 3331090002 HH PPS REVENUE DEBIT

## 2017-05-30 PROCEDURE — 3331090001 HH PPS REVENUE CREDIT

## 2017-05-31 PROCEDURE — 3331090002 HH PPS REVENUE DEBIT

## 2017-05-31 PROCEDURE — 3331090001 HH PPS REVENUE CREDIT

## 2017-06-01 PROCEDURE — 3331090001 HH PPS REVENUE CREDIT

## 2017-06-01 PROCEDURE — 3331090002 HH PPS REVENUE DEBIT

## 2017-06-02 ENCOUNTER — HOME CARE VISIT (OUTPATIENT)
Dept: SCHEDULING | Facility: HOME HEALTH | Age: 82
End: 2017-06-02
Payer: MEDICARE

## 2017-06-02 PROCEDURE — 400014 HH F/U

## 2017-06-02 PROCEDURE — 3331090002 HH PPS REVENUE DEBIT

## 2017-06-02 PROCEDURE — 3331090001 HH PPS REVENUE CREDIT

## 2017-06-02 PROCEDURE — G0299 HHS/HOSPICE OF RN EA 15 MIN: HCPCS

## 2017-06-03 PROCEDURE — 3331090002 HH PPS REVENUE DEBIT

## 2017-06-03 PROCEDURE — 3331090001 HH PPS REVENUE CREDIT

## 2017-06-04 PROCEDURE — 3331090001 HH PPS REVENUE CREDIT

## 2017-06-04 PROCEDURE — 3331090002 HH PPS REVENUE DEBIT

## 2017-06-05 ENCOUNTER — HOME CARE VISIT (OUTPATIENT)
Dept: HOME HEALTH SERVICES | Facility: HOME HEALTH | Age: 82
End: 2017-06-05
Payer: MEDICARE

## 2017-06-05 VITALS
OXYGEN SATURATION: 98 % | TEMPERATURE: 97.6 F | SYSTOLIC BLOOD PRESSURE: 120 MMHG | RESPIRATION RATE: 16 BRPM | DIASTOLIC BLOOD PRESSURE: 62 MMHG | HEART RATE: 70 BPM

## 2017-06-05 PROCEDURE — 3331090001 HH PPS REVENUE CREDIT

## 2017-06-05 PROCEDURE — 3331090002 HH PPS REVENUE DEBIT

## 2017-06-06 PROCEDURE — 3331090001 HH PPS REVENUE CREDIT

## 2017-06-06 PROCEDURE — 3331090002 HH PPS REVENUE DEBIT

## 2017-06-07 PROCEDURE — 3331090002 HH PPS REVENUE DEBIT

## 2017-06-07 PROCEDURE — 3331090001 HH PPS REVENUE CREDIT

## 2017-06-08 PROCEDURE — 3331090001 HH PPS REVENUE CREDIT

## 2017-06-08 PROCEDURE — 3331090002 HH PPS REVENUE DEBIT

## 2017-06-09 PROCEDURE — 3331090002 HH PPS REVENUE DEBIT

## 2017-06-09 PROCEDURE — 3331090001 HH PPS REVENUE CREDIT

## 2017-06-10 ENCOUNTER — HOME CARE VISIT (OUTPATIENT)
Dept: SCHEDULING | Facility: HOME HEALTH | Age: 82
End: 2017-06-10
Payer: MEDICARE

## 2017-06-10 PROCEDURE — 3331090001 HH PPS REVENUE CREDIT

## 2017-06-10 PROCEDURE — 3331090002 HH PPS REVENUE DEBIT

## 2017-06-10 PROCEDURE — G0299 HHS/HOSPICE OF RN EA 15 MIN: HCPCS

## 2017-06-11 PROCEDURE — 3331090002 HH PPS REVENUE DEBIT

## 2017-06-11 PROCEDURE — 3331090001 HH PPS REVENUE CREDIT

## 2017-06-12 PROCEDURE — 3331090002 HH PPS REVENUE DEBIT

## 2017-06-12 PROCEDURE — 3331090001 HH PPS REVENUE CREDIT

## 2017-06-13 PROCEDURE — 3331090002 HH PPS REVENUE DEBIT

## 2017-06-13 PROCEDURE — 3331090001 HH PPS REVENUE CREDIT

## 2017-06-14 PROCEDURE — 3331090002 HH PPS REVENUE DEBIT

## 2017-06-14 PROCEDURE — 3331090001 HH PPS REVENUE CREDIT

## 2017-06-15 VITALS
TEMPERATURE: 97.2 F | DIASTOLIC BLOOD PRESSURE: 68 MMHG | RESPIRATION RATE: 16 BRPM | OXYGEN SATURATION: 97 % | SYSTOLIC BLOOD PRESSURE: 126 MMHG | HEART RATE: 76 BPM

## 2017-06-15 PROCEDURE — 3331090001 HH PPS REVENUE CREDIT

## 2017-06-15 PROCEDURE — 3331090002 HH PPS REVENUE DEBIT

## 2017-06-16 PROCEDURE — 3331090001 HH PPS REVENUE CREDIT

## 2017-06-16 PROCEDURE — 3331090002 HH PPS REVENUE DEBIT

## 2017-06-17 PROCEDURE — 3331090002 HH PPS REVENUE DEBIT

## 2017-06-17 PROCEDURE — 3331090001 HH PPS REVENUE CREDIT

## 2017-06-18 PROCEDURE — 3331090001 HH PPS REVENUE CREDIT

## 2017-06-18 PROCEDURE — 3331090002 HH PPS REVENUE DEBIT

## 2017-06-19 PROCEDURE — 3331090002 HH PPS REVENUE DEBIT

## 2017-06-19 PROCEDURE — 3331090001 HH PPS REVENUE CREDIT

## 2017-06-20 PROCEDURE — 3331090001 HH PPS REVENUE CREDIT

## 2017-06-20 PROCEDURE — 3331090002 HH PPS REVENUE DEBIT

## 2017-06-21 PROCEDURE — 3331090002 HH PPS REVENUE DEBIT

## 2017-06-21 PROCEDURE — 3331090001 HH PPS REVENUE CREDIT

## 2017-06-22 PROCEDURE — 3331090001 HH PPS REVENUE CREDIT

## 2017-06-22 PROCEDURE — 3331090002 HH PPS REVENUE DEBIT

## 2017-06-23 ENCOUNTER — HOME CARE VISIT (OUTPATIENT)
Dept: SCHEDULING | Facility: HOME HEALTH | Age: 82
End: 2017-06-23
Payer: MEDICARE

## 2017-06-23 VITALS
OXYGEN SATURATION: 96 % | SYSTOLIC BLOOD PRESSURE: 124 MMHG | TEMPERATURE: 97.2 F | DIASTOLIC BLOOD PRESSURE: 74 MMHG | HEART RATE: 78 BPM | RESPIRATION RATE: 16 BRPM

## 2017-06-23 PROCEDURE — 3331090002 HH PPS REVENUE DEBIT

## 2017-06-23 PROCEDURE — G0299 HHS/HOSPICE OF RN EA 15 MIN: HCPCS

## 2017-06-23 PROCEDURE — 3331090001 HH PPS REVENUE CREDIT

## 2017-06-24 PROCEDURE — 3331090002 HH PPS REVENUE DEBIT

## 2017-06-24 PROCEDURE — 3331090001 HH PPS REVENUE CREDIT

## 2017-06-25 PROCEDURE — 3331090001 HH PPS REVENUE CREDIT

## 2017-06-25 PROCEDURE — 3331090002 HH PPS REVENUE DEBIT

## 2017-06-26 PROCEDURE — 3331090002 HH PPS REVENUE DEBIT

## 2017-06-26 PROCEDURE — 3331090001 HH PPS REVENUE CREDIT

## 2017-06-27 PROCEDURE — 3331090002 HH PPS REVENUE DEBIT

## 2017-06-27 PROCEDURE — 3331090001 HH PPS REVENUE CREDIT

## 2017-06-28 PROCEDURE — 3331090001 HH PPS REVENUE CREDIT

## 2017-06-28 PROCEDURE — 3331090002 HH PPS REVENUE DEBIT

## 2017-06-29 PROCEDURE — 3331090002 HH PPS REVENUE DEBIT

## 2017-06-29 PROCEDURE — 3331090001 HH PPS REVENUE CREDIT

## 2017-06-30 PROCEDURE — 3331090001 HH PPS REVENUE CREDIT

## 2017-06-30 PROCEDURE — 3331090002 HH PPS REVENUE DEBIT

## 2017-07-01 PROCEDURE — 3331090002 HH PPS REVENUE DEBIT

## 2017-07-01 PROCEDURE — 3331090001 HH PPS REVENUE CREDIT

## 2017-07-02 PROCEDURE — 3331090001 HH PPS REVENUE CREDIT

## 2017-07-02 PROCEDURE — 3331090002 HH PPS REVENUE DEBIT

## 2017-07-03 PROCEDURE — 3331090001 HH PPS REVENUE CREDIT

## 2017-07-03 PROCEDURE — 3331090002 HH PPS REVENUE DEBIT

## 2017-07-04 PROCEDURE — 3331090002 HH PPS REVENUE DEBIT

## 2017-07-04 PROCEDURE — 3331090001 HH PPS REVENUE CREDIT

## 2017-07-05 ENCOUNTER — HOME CARE VISIT (OUTPATIENT)
Dept: SCHEDULING | Facility: HOME HEALTH | Age: 82
End: 2017-07-05
Payer: MEDICARE

## 2017-07-05 VITALS
SYSTOLIC BLOOD PRESSURE: 130 MMHG | HEART RATE: 82 BPM | DIASTOLIC BLOOD PRESSURE: 80 MMHG | OXYGEN SATURATION: 98 % | RESPIRATION RATE: 16 BRPM

## 2017-07-05 PROCEDURE — G0299 HHS/HOSPICE OF RN EA 15 MIN: HCPCS

## 2017-07-05 PROCEDURE — 3331090002 HH PPS REVENUE DEBIT

## 2017-07-05 PROCEDURE — 3331090001 HH PPS REVENUE CREDIT

## 2017-07-06 PROCEDURE — 3331090002 HH PPS REVENUE DEBIT

## 2017-07-06 PROCEDURE — 3331090001 HH PPS REVENUE CREDIT

## 2017-07-07 PROCEDURE — 3331090002 HH PPS REVENUE DEBIT

## 2017-07-07 PROCEDURE — 3331090001 HH PPS REVENUE CREDIT

## 2017-07-08 PROCEDURE — 3331090001 HH PPS REVENUE CREDIT

## 2017-07-08 PROCEDURE — 3331090002 HH PPS REVENUE DEBIT

## 2017-07-08 PROCEDURE — 3331090003 HH PPS REVENUE ADJ

## 2017-07-09 PROCEDURE — 3331090001 HH PPS REVENUE CREDIT

## 2017-07-09 PROCEDURE — 3331090002 HH PPS REVENUE DEBIT

## 2017-07-10 PROCEDURE — 3331090002 HH PPS REVENUE DEBIT

## 2017-07-10 PROCEDURE — 3331090001 HH PPS REVENUE CREDIT

## 2017-07-11 PROCEDURE — 3331090001 HH PPS REVENUE CREDIT

## 2017-07-11 PROCEDURE — 3331090002 HH PPS REVENUE DEBIT

## 2017-07-12 PROCEDURE — 3331090001 HH PPS REVENUE CREDIT

## 2017-07-12 PROCEDURE — 3331090002 HH PPS REVENUE DEBIT

## 2017-07-13 PROCEDURE — 3331090001 HH PPS REVENUE CREDIT

## 2017-07-13 PROCEDURE — 3331090002 HH PPS REVENUE DEBIT

## 2017-07-14 PROCEDURE — 3331090001 HH PPS REVENUE CREDIT

## 2017-07-14 PROCEDURE — 3331090002 HH PPS REVENUE DEBIT

## 2017-07-15 ENCOUNTER — HOME CARE VISIT (OUTPATIENT)
Dept: SCHEDULING | Facility: HOME HEALTH | Age: 82
End: 2017-07-15
Payer: MEDICARE

## 2017-07-15 PROCEDURE — 3331090001 HH PPS REVENUE CREDIT

## 2017-07-15 PROCEDURE — G0299 HHS/HOSPICE OF RN EA 15 MIN: HCPCS

## 2017-07-15 PROCEDURE — 400014 HH F/U

## 2017-07-15 PROCEDURE — 3331090002 HH PPS REVENUE DEBIT

## 2017-07-16 VITALS
OXYGEN SATURATION: 97 % | TEMPERATURE: 97 F | DIASTOLIC BLOOD PRESSURE: 90 MMHG | HEART RATE: 89 BPM | SYSTOLIC BLOOD PRESSURE: 140 MMHG | RESPIRATION RATE: 16 BRPM

## 2017-07-16 PROCEDURE — 3331090002 HH PPS REVENUE DEBIT

## 2017-07-16 PROCEDURE — 3331090001 HH PPS REVENUE CREDIT

## 2017-07-17 PROCEDURE — 3331090002 HH PPS REVENUE DEBIT

## 2017-07-17 PROCEDURE — 3331090001 HH PPS REVENUE CREDIT

## 2017-07-18 PROCEDURE — 3331090001 HH PPS REVENUE CREDIT

## 2017-07-18 PROCEDURE — 3331090002 HH PPS REVENUE DEBIT

## 2017-07-19 PROCEDURE — 3331090001 HH PPS REVENUE CREDIT

## 2017-07-19 PROCEDURE — 3331090002 HH PPS REVENUE DEBIT

## 2017-07-20 PROCEDURE — 3331090001 HH PPS REVENUE CREDIT

## 2017-07-20 PROCEDURE — 3331090002 HH PPS REVENUE DEBIT

## 2017-07-21 PROCEDURE — 3331090002 HH PPS REVENUE DEBIT

## 2017-07-21 PROCEDURE — 3331090001 HH PPS REVENUE CREDIT

## 2017-07-22 PROCEDURE — 3331090001 HH PPS REVENUE CREDIT

## 2017-07-22 PROCEDURE — 3331090002 HH PPS REVENUE DEBIT

## 2017-07-23 PROCEDURE — 3331090002 HH PPS REVENUE DEBIT

## 2017-07-23 PROCEDURE — 3331090001 HH PPS REVENUE CREDIT

## 2017-07-24 PROCEDURE — 3331090001 HH PPS REVENUE CREDIT

## 2017-07-24 PROCEDURE — 3331090002 HH PPS REVENUE DEBIT

## 2017-07-25 PROCEDURE — 3331090002 HH PPS REVENUE DEBIT

## 2017-07-25 PROCEDURE — 3331090001 HH PPS REVENUE CREDIT

## 2017-07-26 PROCEDURE — 3331090002 HH PPS REVENUE DEBIT

## 2017-07-26 PROCEDURE — 3331090001 HH PPS REVENUE CREDIT

## 2017-07-27 PROCEDURE — 3331090001 HH PPS REVENUE CREDIT

## 2017-07-27 PROCEDURE — 3331090002 HH PPS REVENUE DEBIT

## 2017-07-28 PROCEDURE — 3331090001 HH PPS REVENUE CREDIT

## 2017-07-28 PROCEDURE — 3331090002 HH PPS REVENUE DEBIT

## 2017-07-29 PROCEDURE — 3331090002 HH PPS REVENUE DEBIT

## 2017-07-29 PROCEDURE — 3331090001 HH PPS REVENUE CREDIT

## 2017-07-30 PROCEDURE — 3331090001 HH PPS REVENUE CREDIT

## 2017-07-30 PROCEDURE — 3331090002 HH PPS REVENUE DEBIT

## 2017-07-31 PROCEDURE — 3331090002 HH PPS REVENUE DEBIT

## 2017-07-31 PROCEDURE — 3331090001 HH PPS REVENUE CREDIT

## 2017-08-01 PROCEDURE — 3331090002 HH PPS REVENUE DEBIT

## 2017-08-01 PROCEDURE — 3331090001 HH PPS REVENUE CREDIT

## 2017-08-02 PROCEDURE — 3331090001 HH PPS REVENUE CREDIT

## 2017-08-02 PROCEDURE — 3331090002 HH PPS REVENUE DEBIT

## 2017-08-03 PROCEDURE — 3331090001 HH PPS REVENUE CREDIT

## 2017-08-03 PROCEDURE — 3331090002 HH PPS REVENUE DEBIT

## 2017-08-04 ENCOUNTER — HOME CARE VISIT (OUTPATIENT)
Dept: SCHEDULING | Facility: HOME HEALTH | Age: 82
End: 2017-08-04
Payer: MEDICARE

## 2017-08-04 VITALS
HEART RATE: 68 BPM | SYSTOLIC BLOOD PRESSURE: 160 MMHG | OXYGEN SATURATION: 98 % | TEMPERATURE: 97.6 F | RESPIRATION RATE: 18 BRPM | DIASTOLIC BLOOD PRESSURE: 88 MMHG

## 2017-08-04 PROCEDURE — G0299 HHS/HOSPICE OF RN EA 15 MIN: HCPCS

## 2017-08-04 PROCEDURE — 3331090001 HH PPS REVENUE CREDIT

## 2017-08-04 PROCEDURE — 3331090002 HH PPS REVENUE DEBIT

## 2017-08-05 PROCEDURE — 3331090002 HH PPS REVENUE DEBIT

## 2017-08-05 PROCEDURE — 3331090001 HH PPS REVENUE CREDIT

## 2017-08-06 PROCEDURE — 3331090001 HH PPS REVENUE CREDIT

## 2017-08-06 PROCEDURE — 3331090002 HH PPS REVENUE DEBIT

## 2017-08-07 PROCEDURE — 3331090002 HH PPS REVENUE DEBIT

## 2017-08-07 PROCEDURE — 3331090001 HH PPS REVENUE CREDIT

## 2017-08-08 PROCEDURE — 3331090001 HH PPS REVENUE CREDIT

## 2017-08-08 PROCEDURE — 3331090002 HH PPS REVENUE DEBIT

## 2017-08-09 PROCEDURE — 3331090002 HH PPS REVENUE DEBIT

## 2017-08-09 PROCEDURE — 3331090001 HH PPS REVENUE CREDIT

## 2017-08-10 PROCEDURE — 3331090001 HH PPS REVENUE CREDIT

## 2017-08-10 PROCEDURE — 3331090002 HH PPS REVENUE DEBIT

## 2017-08-11 PROCEDURE — 3331090001 HH PPS REVENUE CREDIT

## 2017-08-11 PROCEDURE — 3331090002 HH PPS REVENUE DEBIT

## 2017-08-12 ENCOUNTER — HOME CARE VISIT (OUTPATIENT)
Dept: HOME HEALTH SERVICES | Facility: HOME HEALTH | Age: 82
End: 2017-08-12
Payer: MEDICARE

## 2017-08-12 PROCEDURE — 3331090002 HH PPS REVENUE DEBIT

## 2017-08-12 PROCEDURE — 3331090001 HH PPS REVENUE CREDIT

## 2017-08-13 PROCEDURE — 3331090001 HH PPS REVENUE CREDIT

## 2017-08-13 PROCEDURE — 3331090002 HH PPS REVENUE DEBIT

## 2017-08-14 PROCEDURE — 3331090002 HH PPS REVENUE DEBIT

## 2017-08-14 PROCEDURE — 3331090001 HH PPS REVENUE CREDIT

## 2017-08-15 PROCEDURE — 3331090002 HH PPS REVENUE DEBIT

## 2017-08-15 PROCEDURE — 3331090001 HH PPS REVENUE CREDIT

## 2017-08-16 PROCEDURE — A4338 INDWELLING CATHETER LATEX: HCPCS

## 2017-08-16 PROCEDURE — A4657 SYRINGE W/WO NEEDLE: HCPCS

## 2017-08-16 PROCEDURE — 3331090001 HH PPS REVENUE CREDIT

## 2017-08-16 PROCEDURE — A4310 INSERT TRAY W/O BAG/CATH: HCPCS

## 2017-08-16 PROCEDURE — 3331090002 HH PPS REVENUE DEBIT

## 2017-08-16 PROCEDURE — A4357 BEDSIDE DRAINAGE BAG: HCPCS

## 2017-08-17 ENCOUNTER — HOME CARE VISIT (OUTPATIENT)
Dept: SCHEDULING | Facility: HOME HEALTH | Age: 82
End: 2017-08-17
Payer: MEDICARE

## 2017-08-17 VITALS
RESPIRATION RATE: 18 BRPM | HEART RATE: 90 BPM | TEMPERATURE: 96.9 F | OXYGEN SATURATION: 97 % | SYSTOLIC BLOOD PRESSURE: 140 MMHG | DIASTOLIC BLOOD PRESSURE: 80 MMHG

## 2017-08-17 PROCEDURE — G0299 HHS/HOSPICE OF RN EA 15 MIN: HCPCS

## 2017-08-17 PROCEDURE — 3331090002 HH PPS REVENUE DEBIT

## 2017-08-17 PROCEDURE — 3331090001 HH PPS REVENUE CREDIT

## 2017-08-18 ENCOUNTER — HOME CARE VISIT (OUTPATIENT)
Dept: SCHEDULING | Facility: HOME HEALTH | Age: 82
End: 2017-08-18
Payer: MEDICARE

## 2017-08-18 VITALS
SYSTOLIC BLOOD PRESSURE: 130 MMHG | TEMPERATURE: 97.4 F | HEART RATE: 86 BPM | RESPIRATION RATE: 18 BRPM | DIASTOLIC BLOOD PRESSURE: 72 MMHG | OXYGEN SATURATION: 98 %

## 2017-08-18 LAB
INR BLD: 4.3 (ref 0.9–1.1)
PT POC: 51.9 SECONDS (ref 11.8–14.9)

## 2017-08-18 PROCEDURE — G0299 HHS/HOSPICE OF RN EA 15 MIN: HCPCS

## 2017-08-18 PROCEDURE — 3331090001 HH PPS REVENUE CREDIT

## 2017-08-18 PROCEDURE — 3331090002 HH PPS REVENUE DEBIT

## 2017-08-19 PROCEDURE — 3331090001 HH PPS REVENUE CREDIT

## 2017-08-19 PROCEDURE — 3331090002 HH PPS REVENUE DEBIT

## 2017-08-20 PROCEDURE — 3331090002 HH PPS REVENUE DEBIT

## 2017-08-20 PROCEDURE — 3331090001 HH PPS REVENUE CREDIT

## 2017-08-21 PROCEDURE — 3331090001 HH PPS REVENUE CREDIT

## 2017-08-21 PROCEDURE — 3331090002 HH PPS REVENUE DEBIT

## 2017-08-22 PROCEDURE — 3331090002 HH PPS REVENUE DEBIT

## 2017-08-22 PROCEDURE — 3331090001 HH PPS REVENUE CREDIT

## 2017-08-23 PROCEDURE — 3331090001 HH PPS REVENUE CREDIT

## 2017-08-23 PROCEDURE — 3331090002 HH PPS REVENUE DEBIT

## 2017-08-24 PROCEDURE — 3331090002 HH PPS REVENUE DEBIT

## 2017-08-24 PROCEDURE — 3331090001 HH PPS REVENUE CREDIT

## 2017-08-25 PROCEDURE — 3331090001 HH PPS REVENUE CREDIT

## 2017-08-25 PROCEDURE — 3331090002 HH PPS REVENUE DEBIT

## 2017-08-26 PROCEDURE — 3331090002 HH PPS REVENUE DEBIT

## 2017-08-26 PROCEDURE — 3331090001 HH PPS REVENUE CREDIT

## 2017-08-27 PROCEDURE — 3331090001 HH PPS REVENUE CREDIT

## 2017-08-27 PROCEDURE — 3331090002 HH PPS REVENUE DEBIT

## 2017-08-28 ENCOUNTER — HOME CARE VISIT (OUTPATIENT)
Dept: SCHEDULING | Facility: HOME HEALTH | Age: 82
End: 2017-08-28
Payer: MEDICARE

## 2017-08-28 VITALS
RESPIRATION RATE: 18 BRPM | HEART RATE: 72 BPM | DIASTOLIC BLOOD PRESSURE: 80 MMHG | SYSTOLIC BLOOD PRESSURE: 128 MMHG | OXYGEN SATURATION: 98 % | TEMPERATURE: 97.9 F

## 2017-08-28 LAB
INR BLD: 1.6 (ref 0.9–1.1)
PT POC: 18.6 SECONDS (ref 11.8–14.9)

## 2017-08-28 PROCEDURE — 3331090002 HH PPS REVENUE DEBIT

## 2017-08-28 PROCEDURE — 3331090001 HH PPS REVENUE CREDIT

## 2017-08-28 PROCEDURE — G0299 HHS/HOSPICE OF RN EA 15 MIN: HCPCS

## 2017-08-29 PROCEDURE — A4338 INDWELLING CATHETER LATEX: HCPCS

## 2017-08-29 PROCEDURE — 3331090002 HH PPS REVENUE DEBIT

## 2017-08-29 PROCEDURE — 3331090001 HH PPS REVENUE CREDIT

## 2017-08-29 PROCEDURE — A4333 URINARY CATH ANCHOR DEVICE: HCPCS

## 2017-08-29 PROCEDURE — A5120 SKIN BARRIER, WIPE OR SWAB: HCPCS

## 2017-08-29 PROCEDURE — A4310 INSERT TRAY W/O BAG/CATH: HCPCS

## 2017-08-29 PROCEDURE — A4657 SYRINGE W/WO NEEDLE: HCPCS

## 2017-08-29 PROCEDURE — A4357 BEDSIDE DRAINAGE BAG: HCPCS

## 2017-08-30 PROCEDURE — 3331090001 HH PPS REVENUE CREDIT

## 2017-08-30 PROCEDURE — 3331090002 HH PPS REVENUE DEBIT

## 2017-08-31 PROCEDURE — 3331090001 HH PPS REVENUE CREDIT

## 2017-08-31 PROCEDURE — 3331090002 HH PPS REVENUE DEBIT

## 2017-09-01 PROCEDURE — 3331090001 HH PPS REVENUE CREDIT

## 2017-09-01 PROCEDURE — 3331090002 HH PPS REVENUE DEBIT

## 2017-09-02 PROCEDURE — 3331090001 HH PPS REVENUE CREDIT

## 2017-09-02 PROCEDURE — 3331090002 HH PPS REVENUE DEBIT

## 2017-09-03 PROCEDURE — 3331090002 HH PPS REVENUE DEBIT

## 2017-09-03 PROCEDURE — 3331090001 HH PPS REVENUE CREDIT

## 2017-09-04 PROCEDURE — 3331090001 HH PPS REVENUE CREDIT

## 2017-09-04 PROCEDURE — 3331090002 HH PPS REVENUE DEBIT

## 2017-09-05 PROCEDURE — 3331090001 HH PPS REVENUE CREDIT

## 2017-09-05 PROCEDURE — 3331090002 HH PPS REVENUE DEBIT

## 2017-09-06 ENCOUNTER — HOME CARE VISIT (OUTPATIENT)
Dept: SCHEDULING | Facility: HOME HEALTH | Age: 82
End: 2017-09-06
Payer: MEDICARE

## 2017-09-06 LAB
INR BLD: 2.2 (ref 0.9–1.1)
PT POC: 26.9 SECONDS (ref 11.8–14.9)

## 2017-09-06 PROCEDURE — 3331090002 HH PPS REVENUE DEBIT

## 2017-09-06 PROCEDURE — G0299 HHS/HOSPICE OF RN EA 15 MIN: HCPCS

## 2017-09-06 PROCEDURE — 3331090001 HH PPS REVENUE CREDIT

## 2017-09-07 VITALS
DIASTOLIC BLOOD PRESSURE: 78 MMHG | SYSTOLIC BLOOD PRESSURE: 130 MMHG | TEMPERATURE: 97.4 F | RESPIRATION RATE: 16 BRPM | OXYGEN SATURATION: 98 % | HEART RATE: 76 BPM

## 2017-09-07 PROCEDURE — 3331090001 HH PPS REVENUE CREDIT

## 2017-09-07 PROCEDURE — 3331090002 HH PPS REVENUE DEBIT

## 2017-09-08 PROCEDURE — 3331090002 HH PPS REVENUE DEBIT

## 2017-09-08 PROCEDURE — 3331090001 HH PPS REVENUE CREDIT

## 2017-09-09 PROCEDURE — 3331090001 HH PPS REVENUE CREDIT

## 2017-09-09 PROCEDURE — 3331090002 HH PPS REVENUE DEBIT

## 2017-09-10 PROCEDURE — 3331090001 HH PPS REVENUE CREDIT

## 2017-09-10 PROCEDURE — 3331090002 HH PPS REVENUE DEBIT

## 2017-09-11 PROCEDURE — 3331090002 HH PPS REVENUE DEBIT

## 2017-09-11 PROCEDURE — 3331090001 HH PPS REVENUE CREDIT

## 2017-09-12 ENCOUNTER — HOME CARE VISIT (OUTPATIENT)
Dept: SCHEDULING | Facility: HOME HEALTH | Age: 82
End: 2017-09-12
Payer: MEDICARE

## 2017-09-12 VITALS
HEART RATE: 84 BPM | DIASTOLIC BLOOD PRESSURE: 80 MMHG | OXYGEN SATURATION: 98 % | RESPIRATION RATE: 24 BRPM | TEMPERATURE: 97.7 F | SYSTOLIC BLOOD PRESSURE: 120 MMHG

## 2017-09-12 PROCEDURE — 3331090002 HH PPS REVENUE DEBIT

## 2017-09-12 PROCEDURE — 400014 HH F/U

## 2017-09-12 PROCEDURE — G0299 HHS/HOSPICE OF RN EA 15 MIN: HCPCS

## 2017-09-12 PROCEDURE — 3331090001 HH PPS REVENUE CREDIT

## 2017-09-13 PROCEDURE — 3331090002 HH PPS REVENUE DEBIT

## 2017-09-13 PROCEDURE — 3331090001 HH PPS REVENUE CREDIT

## 2017-09-14 PROCEDURE — 3331090001 HH PPS REVENUE CREDIT

## 2017-09-14 PROCEDURE — 3331090002 HH PPS REVENUE DEBIT

## 2017-09-15 PROCEDURE — 3331090002 HH PPS REVENUE DEBIT

## 2017-09-15 PROCEDURE — 3331090001 HH PPS REVENUE CREDIT

## 2017-09-16 PROCEDURE — 3331090002 HH PPS REVENUE DEBIT

## 2017-09-16 PROCEDURE — 3331090001 HH PPS REVENUE CREDIT

## 2017-09-17 PROCEDURE — 3331090001 HH PPS REVENUE CREDIT

## 2017-09-17 PROCEDURE — 3331090002 HH PPS REVENUE DEBIT

## 2017-09-18 PROCEDURE — 3331090002 HH PPS REVENUE DEBIT

## 2017-09-18 PROCEDURE — 3331090001 HH PPS REVENUE CREDIT

## 2017-09-19 PROCEDURE — 3331090001 HH PPS REVENUE CREDIT

## 2017-09-19 PROCEDURE — 3331090002 HH PPS REVENUE DEBIT

## 2017-09-20 PROCEDURE — 3331090002 HH PPS REVENUE DEBIT

## 2017-09-20 PROCEDURE — 3331090001 HH PPS REVENUE CREDIT

## 2017-09-21 PROCEDURE — 3331090001 HH PPS REVENUE CREDIT

## 2017-09-21 PROCEDURE — 3331090002 HH PPS REVENUE DEBIT

## 2017-09-22 PROCEDURE — 3331090001 HH PPS REVENUE CREDIT

## 2017-09-22 PROCEDURE — 3331090002 HH PPS REVENUE DEBIT

## 2017-09-23 PROCEDURE — 3331090002 HH PPS REVENUE DEBIT

## 2017-09-23 PROCEDURE — 3331090001 HH PPS REVENUE CREDIT

## 2017-09-24 PROCEDURE — 3331090002 HH PPS REVENUE DEBIT

## 2017-09-24 PROCEDURE — 3331090001 HH PPS REVENUE CREDIT

## 2017-09-25 PROCEDURE — 3331090002 HH PPS REVENUE DEBIT

## 2017-09-25 PROCEDURE — 3331090001 HH PPS REVENUE CREDIT

## 2017-09-26 ENCOUNTER — HOME CARE VISIT (OUTPATIENT)
Dept: SCHEDULING | Facility: HOME HEALTH | Age: 82
End: 2017-09-26
Payer: MEDICARE

## 2017-09-26 VITALS
SYSTOLIC BLOOD PRESSURE: 134 MMHG | DIASTOLIC BLOOD PRESSURE: 82 MMHG | HEART RATE: 57 BPM | RESPIRATION RATE: 16 BRPM | OXYGEN SATURATION: 99 % | TEMPERATURE: 97.1 F

## 2017-09-26 VITALS
SYSTOLIC BLOOD PRESSURE: 132 MMHG | HEART RATE: 78 BPM | OXYGEN SATURATION: 98 % | DIASTOLIC BLOOD PRESSURE: 74 MMHG | RESPIRATION RATE: 18 BRPM | TEMPERATURE: 98.4 F

## 2017-09-26 LAB
INR BLD: 3.2 (ref 0.9–1.1)
PT POC: 38.6 SECONDS (ref 11.8–14.9)

## 2017-09-26 PROCEDURE — G0299 HHS/HOSPICE OF RN EA 15 MIN: HCPCS

## 2017-09-26 PROCEDURE — 3331090002 HH PPS REVENUE DEBIT

## 2017-09-26 PROCEDURE — 3331090001 HH PPS REVENUE CREDIT

## 2017-09-27 PROCEDURE — 3331090001 HH PPS REVENUE CREDIT

## 2017-09-27 PROCEDURE — A4310 INSERT TRAY W/O BAG/CATH: HCPCS

## 2017-09-27 PROCEDURE — A4333 URINARY CATH ANCHOR DEVICE: HCPCS

## 2017-09-27 PROCEDURE — 3331090002 HH PPS REVENUE DEBIT

## 2017-09-27 PROCEDURE — A4657 SYRINGE W/WO NEEDLE: HCPCS

## 2017-09-27 PROCEDURE — A4314 CATH W/DRAINAGE 2-WAY LATEX: HCPCS

## 2017-09-27 PROCEDURE — A4357 BEDSIDE DRAINAGE BAG: HCPCS

## 2017-09-28 PROCEDURE — 3331090001 HH PPS REVENUE CREDIT

## 2017-09-28 PROCEDURE — 3331090002 HH PPS REVENUE DEBIT

## 2017-09-29 PROCEDURE — 3331090001 HH PPS REVENUE CREDIT

## 2017-09-29 PROCEDURE — 3331090002 HH PPS REVENUE DEBIT

## 2017-09-30 PROCEDURE — 3331090002 HH PPS REVENUE DEBIT

## 2017-09-30 PROCEDURE — 3331090001 HH PPS REVENUE CREDIT

## 2017-10-01 PROCEDURE — 3331090002 HH PPS REVENUE DEBIT

## 2017-10-01 PROCEDURE — 3331090001 HH PPS REVENUE CREDIT

## 2017-10-02 ENCOUNTER — HOME CARE VISIT (OUTPATIENT)
Dept: HOME HEALTH SERVICES | Facility: HOME HEALTH | Age: 82
End: 2017-10-02
Payer: MEDICARE

## 2017-10-02 PROCEDURE — 3331090001 HH PPS REVENUE CREDIT

## 2017-10-02 PROCEDURE — 3331090002 HH PPS REVENUE DEBIT

## 2017-10-03 ENCOUNTER — HOME CARE VISIT (OUTPATIENT)
Dept: SCHEDULING | Facility: HOME HEALTH | Age: 82
End: 2017-10-03
Payer: MEDICARE

## 2017-10-03 PROCEDURE — 3331090001 HH PPS REVENUE CREDIT

## 2017-10-03 PROCEDURE — 3331090002 HH PPS REVENUE DEBIT

## 2017-10-03 PROCEDURE — G0299 HHS/HOSPICE OF RN EA 15 MIN: HCPCS

## 2017-10-04 VITALS
HEART RATE: 81 BPM | SYSTOLIC BLOOD PRESSURE: 128 MMHG | RESPIRATION RATE: 18 BRPM | DIASTOLIC BLOOD PRESSURE: 72 MMHG | TEMPERATURE: 97.8 F | OXYGEN SATURATION: 98 %

## 2017-10-04 PROCEDURE — 3331090001 HH PPS REVENUE CREDIT

## 2017-10-04 PROCEDURE — 3331090002 HH PPS REVENUE DEBIT

## 2017-10-05 PROCEDURE — 3331090002 HH PPS REVENUE DEBIT

## 2017-10-05 PROCEDURE — 3331090001 HH PPS REVENUE CREDIT

## 2017-10-06 PROCEDURE — 3331090002 HH PPS REVENUE DEBIT

## 2017-10-06 PROCEDURE — 3331090001 HH PPS REVENUE CREDIT

## 2017-10-07 PROCEDURE — 3331090002 HH PPS REVENUE DEBIT

## 2017-10-07 PROCEDURE — 3331090001 HH PPS REVENUE CREDIT

## 2017-10-08 PROCEDURE — 3331090001 HH PPS REVENUE CREDIT

## 2017-10-08 PROCEDURE — 3331090002 HH PPS REVENUE DEBIT

## 2017-10-09 PROCEDURE — 3331090001 HH PPS REVENUE CREDIT

## 2017-10-09 PROCEDURE — 3331090002 HH PPS REVENUE DEBIT

## 2017-10-10 ENCOUNTER — HOME CARE VISIT (OUTPATIENT)
Dept: SCHEDULING | Facility: HOME HEALTH | Age: 82
End: 2017-10-10
Payer: MEDICARE

## 2017-10-10 VITALS
DIASTOLIC BLOOD PRESSURE: 68 MMHG | SYSTOLIC BLOOD PRESSURE: 130 MMHG | HEART RATE: 77 BPM | RESPIRATION RATE: 16 BRPM | OXYGEN SATURATION: 98 % | TEMPERATURE: 98.1 F

## 2017-10-10 PROCEDURE — 3331090002 HH PPS REVENUE DEBIT

## 2017-10-10 PROCEDURE — G0299 HHS/HOSPICE OF RN EA 15 MIN: HCPCS

## 2017-10-10 PROCEDURE — 3331090001 HH PPS REVENUE CREDIT

## 2017-10-11 PROCEDURE — 3331090001 HH PPS REVENUE CREDIT

## 2017-10-11 PROCEDURE — 3331090002 HH PPS REVENUE DEBIT

## 2017-10-12 PROCEDURE — 3331090001 HH PPS REVENUE CREDIT

## 2017-10-12 PROCEDURE — 3331090002 HH PPS REVENUE DEBIT

## 2017-10-13 PROCEDURE — 3331090001 HH PPS REVENUE CREDIT

## 2017-10-13 PROCEDURE — 3331090002 HH PPS REVENUE DEBIT

## 2017-10-14 PROCEDURE — 3331090002 HH PPS REVENUE DEBIT

## 2017-10-14 PROCEDURE — 3331090001 HH PPS REVENUE CREDIT

## 2017-10-15 PROCEDURE — 3331090002 HH PPS REVENUE DEBIT

## 2017-10-15 PROCEDURE — 3331090001 HH PPS REVENUE CREDIT

## 2017-10-16 ENCOUNTER — HOME CARE VISIT (OUTPATIENT)
Dept: SCHEDULING | Facility: HOME HEALTH | Age: 82
End: 2017-10-16
Payer: MEDICARE

## 2017-10-16 VITALS
TEMPERATURE: 98.6 F | HEART RATE: 83 BPM | RESPIRATION RATE: 20 BRPM | OXYGEN SATURATION: 99 % | SYSTOLIC BLOOD PRESSURE: 110 MMHG | DIASTOLIC BLOOD PRESSURE: 70 MMHG

## 2017-10-16 PROCEDURE — 3331090002 HH PPS REVENUE DEBIT

## 2017-10-16 PROCEDURE — G0299 HHS/HOSPICE OF RN EA 15 MIN: HCPCS

## 2017-10-16 PROCEDURE — 3331090001 HH PPS REVENUE CREDIT

## 2017-10-17 PROCEDURE — 3331090001 HH PPS REVENUE CREDIT

## 2017-10-17 PROCEDURE — 3331090002 HH PPS REVENUE DEBIT

## 2017-10-18 PROCEDURE — 3331090002 HH PPS REVENUE DEBIT

## 2017-10-18 PROCEDURE — 3331090001 HH PPS REVENUE CREDIT

## 2017-10-19 PROCEDURE — 3331090001 HH PPS REVENUE CREDIT

## 2017-10-19 PROCEDURE — 3331090002 HH PPS REVENUE DEBIT

## 2017-10-20 PROCEDURE — 3331090001 HH PPS REVENUE CREDIT

## 2017-10-20 PROCEDURE — 3331090002 HH PPS REVENUE DEBIT

## 2017-10-21 ENCOUNTER — HOME CARE VISIT (OUTPATIENT)
Dept: HOME HEALTH SERVICES | Facility: HOME HEALTH | Age: 82
End: 2017-10-21
Payer: MEDICARE

## 2017-10-21 PROCEDURE — 3331090002 HH PPS REVENUE DEBIT

## 2017-10-21 PROCEDURE — 3331090001 HH PPS REVENUE CREDIT

## 2017-10-22 PROCEDURE — 3331090002 HH PPS REVENUE DEBIT

## 2017-10-22 PROCEDURE — 3331090001 HH PPS REVENUE CREDIT

## 2017-10-23 ENCOUNTER — HOME CARE VISIT (OUTPATIENT)
Dept: SCHEDULING | Facility: HOME HEALTH | Age: 82
End: 2017-10-23
Payer: MEDICARE

## 2017-10-23 VITALS
RESPIRATION RATE: 20 BRPM | OXYGEN SATURATION: 94 % | HEART RATE: 100 BPM | TEMPERATURE: 96.8 F | SYSTOLIC BLOOD PRESSURE: 180 MMHG | DIASTOLIC BLOOD PRESSURE: 90 MMHG

## 2017-10-23 PROCEDURE — G0299 HHS/HOSPICE OF RN EA 15 MIN: HCPCS

## 2017-10-23 PROCEDURE — 3331090001 HH PPS REVENUE CREDIT

## 2017-10-23 PROCEDURE — 3331090002 HH PPS REVENUE DEBIT

## 2017-10-24 PROCEDURE — 3331090002 HH PPS REVENUE DEBIT

## 2017-10-24 PROCEDURE — 3331090001 HH PPS REVENUE CREDIT

## 2017-10-25 PROCEDURE — 3331090001 HH PPS REVENUE CREDIT

## 2017-10-25 PROCEDURE — 3331090002 HH PPS REVENUE DEBIT

## 2017-10-26 PROCEDURE — 3331090002 HH PPS REVENUE DEBIT

## 2017-10-26 PROCEDURE — 3331090001 HH PPS REVENUE CREDIT

## 2017-10-27 PROCEDURE — 3331090002 HH PPS REVENUE DEBIT

## 2017-10-27 PROCEDURE — 3331090001 HH PPS REVENUE CREDIT

## 2017-10-28 PROCEDURE — 3331090002 HH PPS REVENUE DEBIT

## 2017-10-28 PROCEDURE — 3331090001 HH PPS REVENUE CREDIT

## 2017-10-29 PROCEDURE — 3331090001 HH PPS REVENUE CREDIT

## 2017-10-29 PROCEDURE — 3331090002 HH PPS REVENUE DEBIT

## 2017-10-30 PROCEDURE — 3331090002 HH PPS REVENUE DEBIT

## 2017-10-30 PROCEDURE — 3331090001 HH PPS REVENUE CREDIT

## 2017-10-31 PROCEDURE — 3331090001 HH PPS REVENUE CREDIT

## 2017-10-31 PROCEDURE — 3331090002 HH PPS REVENUE DEBIT

## 2017-11-01 PROCEDURE — 3331090002 HH PPS REVENUE DEBIT

## 2017-11-01 PROCEDURE — 3331090001 HH PPS REVENUE CREDIT

## 2017-11-02 PROCEDURE — 3331090001 HH PPS REVENUE CREDIT

## 2017-11-02 PROCEDURE — 3331090002 HH PPS REVENUE DEBIT

## 2017-11-03 ENCOUNTER — HOME CARE VISIT (OUTPATIENT)
Dept: SCHEDULING | Facility: HOME HEALTH | Age: 82
End: 2017-11-03
Payer: MEDICARE

## 2017-11-03 LAB
INR BLD: 1.5 (ref 0.9–1.1)
PT POC: 17.7 SECONDS (ref 11.8–14.9)

## 2017-11-03 PROCEDURE — 3331090002 HH PPS REVENUE DEBIT

## 2017-11-03 PROCEDURE — G0299 HHS/HOSPICE OF RN EA 15 MIN: HCPCS

## 2017-11-03 PROCEDURE — 3331090001 HH PPS REVENUE CREDIT

## 2017-11-04 PROCEDURE — 3331090001 HH PPS REVENUE CREDIT

## 2017-11-04 PROCEDURE — 3331090002 HH PPS REVENUE DEBIT

## 2017-11-05 PROCEDURE — 3331090001 HH PPS REVENUE CREDIT

## 2017-11-05 PROCEDURE — 3331090002 HH PPS REVENUE DEBIT

## 2017-11-06 VITALS
RESPIRATION RATE: 18 BRPM | DIASTOLIC BLOOD PRESSURE: 76 MMHG | TEMPERATURE: 98 F | SYSTOLIC BLOOD PRESSURE: 140 MMHG | HEART RATE: 88 BPM | OXYGEN SATURATION: 96 %

## 2017-11-06 PROCEDURE — 3331090001 HH PPS REVENUE CREDIT

## 2017-11-06 PROCEDURE — 3331090002 HH PPS REVENUE DEBIT

## 2017-11-07 PROCEDURE — 3331090002 HH PPS REVENUE DEBIT

## 2017-11-07 PROCEDURE — 3331090001 HH PPS REVENUE CREDIT

## 2017-11-08 PROCEDURE — 3331090002 HH PPS REVENUE DEBIT

## 2017-11-08 PROCEDURE — 3331090001 HH PPS REVENUE CREDIT

## 2017-11-09 PROCEDURE — 3331090002 HH PPS REVENUE DEBIT

## 2017-11-09 PROCEDURE — 3331090001 HH PPS REVENUE CREDIT

## 2017-11-10 PROCEDURE — 3331090002 HH PPS REVENUE DEBIT

## 2017-11-10 PROCEDURE — 3331090001 HH PPS REVENUE CREDIT

## 2017-11-11 PROCEDURE — 3331090001 HH PPS REVENUE CREDIT

## 2017-11-11 PROCEDURE — 3331090002 HH PPS REVENUE DEBIT

## 2017-11-12 ENCOUNTER — HOME CARE VISIT (OUTPATIENT)
Dept: SCHEDULING | Facility: HOME HEALTH | Age: 82
End: 2017-11-12
Payer: MEDICARE

## 2017-11-12 VITALS
OXYGEN SATURATION: 97 % | DIASTOLIC BLOOD PRESSURE: 80 MMHG | TEMPERATURE: 98 F | SYSTOLIC BLOOD PRESSURE: 122 MMHG | RESPIRATION RATE: 15 BRPM | HEART RATE: 75 BPM

## 2017-11-12 PROCEDURE — 3331090001 HH PPS REVENUE CREDIT

## 2017-11-12 PROCEDURE — G0299 HHS/HOSPICE OF RN EA 15 MIN: HCPCS

## 2017-11-12 PROCEDURE — 400014 HH F/U

## 2017-11-12 PROCEDURE — 3331090002 HH PPS REVENUE DEBIT

## 2017-11-13 PROCEDURE — 3331090001 HH PPS REVENUE CREDIT

## 2017-11-13 PROCEDURE — 3331090002 HH PPS REVENUE DEBIT

## 2017-11-14 PROCEDURE — 3331090001 HH PPS REVENUE CREDIT

## 2017-11-14 PROCEDURE — 3331090002 HH PPS REVENUE DEBIT

## 2017-11-15 PROCEDURE — 3331090001 HH PPS REVENUE CREDIT

## 2017-11-15 PROCEDURE — 3331090002 HH PPS REVENUE DEBIT

## 2017-11-16 PROCEDURE — 3331090001 HH PPS REVENUE CREDIT

## 2017-11-16 PROCEDURE — 3331090002 HH PPS REVENUE DEBIT

## 2017-11-17 PROCEDURE — 3331090001 HH PPS REVENUE CREDIT

## 2017-11-17 PROCEDURE — 3331090002 HH PPS REVENUE DEBIT

## 2017-11-18 PROCEDURE — 3331090002 HH PPS REVENUE DEBIT

## 2017-11-18 PROCEDURE — 3331090001 HH PPS REVENUE CREDIT

## 2017-11-19 PROCEDURE — 3331090001 HH PPS REVENUE CREDIT

## 2017-11-19 PROCEDURE — 3331090002 HH PPS REVENUE DEBIT

## 2017-11-20 PROCEDURE — 3331090001 HH PPS REVENUE CREDIT

## 2017-11-20 PROCEDURE — 3331090002 HH PPS REVENUE DEBIT

## 2017-11-21 ENCOUNTER — HOME CARE VISIT (OUTPATIENT)
Dept: SCHEDULING | Facility: HOME HEALTH | Age: 82
End: 2017-11-21
Payer: MEDICARE

## 2017-11-21 ENCOUNTER — HOSPITAL ENCOUNTER (OUTPATIENT)
Dept: LAB | Age: 82
Discharge: HOME | End: 2017-11-21
Attending: FAMILY MEDICINE
Payer: MEDICARE

## 2017-11-21 VITALS
HEART RATE: 83 BPM | TEMPERATURE: 98.2 F | SYSTOLIC BLOOD PRESSURE: 140 MMHG | DIASTOLIC BLOOD PRESSURE: 78 MMHG | RESPIRATION RATE: 16 BRPM | OXYGEN SATURATION: 99 %

## 2017-11-21 LAB
INR PPP: 1.5 (ref 0.9–1.2)
INR, POC: 1.5 (ref 0.7–1.2)
PROTHROMBIN TIME, POC: 15.8 SECONDS (ref 6.5–11.9)
PROTHROMBIN TIME: 15.8 SEC (ref 9.6–12)

## 2017-11-21 PROCEDURE — 3331090002 HH PPS REVENUE DEBIT

## 2017-11-21 PROCEDURE — G0299 HHS/HOSPICE OF RN EA 15 MIN: HCPCS

## 2017-11-21 PROCEDURE — 3331090001 HH PPS REVENUE CREDIT

## 2017-11-21 PROCEDURE — 85610 PROTHROMBIN TIME: CPT | Performed by: FAMILY MEDICINE

## 2017-11-22 PROCEDURE — 3331090002 HH PPS REVENUE DEBIT

## 2017-11-22 PROCEDURE — 3331090001 HH PPS REVENUE CREDIT

## 2017-11-22 NOTE — PROGRESS NOTES
Needs to increase his coumadin dose by 20%. Please find out what his dosages are daily and let me know so we can increase them.

## 2017-11-23 PROCEDURE — 3331090002 HH PPS REVENUE DEBIT

## 2017-11-23 PROCEDURE — 3331090001 HH PPS REVENUE CREDIT

## 2017-11-24 PROCEDURE — 3331090002 HH PPS REVENUE DEBIT

## 2017-11-24 PROCEDURE — 3331090001 HH PPS REVENUE CREDIT

## 2017-11-25 PROCEDURE — 3331090002 HH PPS REVENUE DEBIT

## 2017-11-25 PROCEDURE — 3331090001 HH PPS REVENUE CREDIT

## 2017-11-26 PROCEDURE — 3331090002 HH PPS REVENUE DEBIT

## 2017-11-26 PROCEDURE — 3331090001 HH PPS REVENUE CREDIT

## 2017-11-27 PROCEDURE — 3331090002 HH PPS REVENUE DEBIT

## 2017-11-27 PROCEDURE — 3331090001 HH PPS REVENUE CREDIT

## 2017-11-28 PROCEDURE — 3331090002 HH PPS REVENUE DEBIT

## 2017-11-28 PROCEDURE — 3331090001 HH PPS REVENUE CREDIT

## 2017-11-29 PROCEDURE — 3331090002 HH PPS REVENUE DEBIT

## 2017-11-29 PROCEDURE — 3331090001 HH PPS REVENUE CREDIT

## 2017-11-30 PROCEDURE — 3331090001 HH PPS REVENUE CREDIT

## 2017-11-30 PROCEDURE — 3331090002 HH PPS REVENUE DEBIT

## 2017-12-01 PROCEDURE — 3331090001 HH PPS REVENUE CREDIT

## 2017-12-01 PROCEDURE — 3331090002 HH PPS REVENUE DEBIT

## 2017-12-02 PROCEDURE — 3331090002 HH PPS REVENUE DEBIT

## 2017-12-02 PROCEDURE — 3331090001 HH PPS REVENUE CREDIT

## 2017-12-03 PROCEDURE — 3331090001 HH PPS REVENUE CREDIT

## 2017-12-03 PROCEDURE — 3331090002 HH PPS REVENUE DEBIT

## 2017-12-04 PROCEDURE — 3331090001 HH PPS REVENUE CREDIT

## 2017-12-04 PROCEDURE — 3331090002 HH PPS REVENUE DEBIT

## 2017-12-05 ENCOUNTER — HOME CARE VISIT (OUTPATIENT)
Dept: SCHEDULING | Facility: HOME HEALTH | Age: 82
End: 2017-12-05
Payer: MEDICARE

## 2017-12-05 PROCEDURE — G0299 HHS/HOSPICE OF RN EA 15 MIN: HCPCS

## 2017-12-05 PROCEDURE — 3331090002 HH PPS REVENUE DEBIT

## 2017-12-05 PROCEDURE — 3331090001 HH PPS REVENUE CREDIT

## 2017-12-06 PROCEDURE — 3331090002 HH PPS REVENUE DEBIT

## 2017-12-06 PROCEDURE — 3331090001 HH PPS REVENUE CREDIT

## 2017-12-07 VITALS
DIASTOLIC BLOOD PRESSURE: 76 MMHG | OXYGEN SATURATION: 93 % | HEART RATE: 88 BPM | RESPIRATION RATE: 16 BRPM | TEMPERATURE: 98.2 F | SYSTOLIC BLOOD PRESSURE: 130 MMHG

## 2017-12-07 PROCEDURE — 3331090002 HH PPS REVENUE DEBIT

## 2017-12-07 PROCEDURE — 3331090001 HH PPS REVENUE CREDIT

## 2017-12-08 PROCEDURE — 3331090002 HH PPS REVENUE DEBIT

## 2017-12-08 PROCEDURE — 3331090001 HH PPS REVENUE CREDIT

## 2017-12-09 PROCEDURE — 3331090002 HH PPS REVENUE DEBIT

## 2017-12-09 PROCEDURE — 3331090001 HH PPS REVENUE CREDIT

## 2017-12-10 PROCEDURE — 3331090001 HH PPS REVENUE CREDIT

## 2017-12-10 PROCEDURE — 3331090002 HH PPS REVENUE DEBIT

## 2017-12-11 PROCEDURE — 3331090002 HH PPS REVENUE DEBIT

## 2017-12-11 PROCEDURE — 3331090001 HH PPS REVENUE CREDIT

## 2017-12-12 PROCEDURE — 3331090002 HH PPS REVENUE DEBIT

## 2017-12-12 PROCEDURE — 3331090001 HH PPS REVENUE CREDIT

## 2017-12-13 PROCEDURE — 3331090001 HH PPS REVENUE CREDIT

## 2017-12-13 PROCEDURE — 3331090002 HH PPS REVENUE DEBIT

## 2017-12-14 PROCEDURE — 3331090001 HH PPS REVENUE CREDIT

## 2017-12-14 PROCEDURE — 3331090002 HH PPS REVENUE DEBIT

## 2017-12-15 PROCEDURE — 3331090002 HH PPS REVENUE DEBIT

## 2017-12-15 PROCEDURE — 3331090001 HH PPS REVENUE CREDIT

## 2017-12-16 PROCEDURE — 3331090002 HH PPS REVENUE DEBIT

## 2017-12-16 PROCEDURE — 3331090001 HH PPS REVENUE CREDIT

## 2017-12-17 PROCEDURE — 3331090002 HH PPS REVENUE DEBIT

## 2017-12-17 PROCEDURE — 3331090001 HH PPS REVENUE CREDIT

## 2017-12-18 ENCOUNTER — HOME CARE VISIT (OUTPATIENT)
Dept: SCHEDULING | Facility: HOME HEALTH | Age: 82
End: 2017-12-18
Payer: MEDICARE

## 2017-12-18 VITALS
DIASTOLIC BLOOD PRESSURE: 70 MMHG | SYSTOLIC BLOOD PRESSURE: 120 MMHG | HEART RATE: 68 BPM | RESPIRATION RATE: 16 BRPM | TEMPERATURE: 98 F | OXYGEN SATURATION: 94 %

## 2017-12-18 LAB
INR BLD: 3.4 (ref 0.9–1.1)
PT POC: 40.5 SECONDS (ref 11.8–14.9)

## 2017-12-18 PROCEDURE — 3331090002 HH PPS REVENUE DEBIT

## 2017-12-18 PROCEDURE — A4657 SYRINGE W/WO NEEDLE: HCPCS

## 2017-12-18 PROCEDURE — A4314 CATH W/DRAINAGE 2-WAY LATEX: HCPCS

## 2017-12-18 PROCEDURE — G0299 HHS/HOSPICE OF RN EA 15 MIN: HCPCS

## 2017-12-18 PROCEDURE — 3331090001 HH PPS REVENUE CREDIT

## 2017-12-19 PROCEDURE — 3331090001 HH PPS REVENUE CREDIT

## 2017-12-19 PROCEDURE — 3331090002 HH PPS REVENUE DEBIT

## 2017-12-20 PROCEDURE — 3331090002 HH PPS REVENUE DEBIT

## 2017-12-20 PROCEDURE — 3331090001 HH PPS REVENUE CREDIT

## 2017-12-21 PROCEDURE — 3331090002 HH PPS REVENUE DEBIT

## 2017-12-21 PROCEDURE — 3331090001 HH PPS REVENUE CREDIT

## 2017-12-22 PROCEDURE — 3331090002 HH PPS REVENUE DEBIT

## 2017-12-22 PROCEDURE — 3331090001 HH PPS REVENUE CREDIT

## 2017-12-23 PROCEDURE — 3331090002 HH PPS REVENUE DEBIT

## 2017-12-23 PROCEDURE — 3331090001 HH PPS REVENUE CREDIT

## 2017-12-24 PROCEDURE — 3331090002 HH PPS REVENUE DEBIT

## 2017-12-24 PROCEDURE — 3331090001 HH PPS REVENUE CREDIT

## 2017-12-25 PROCEDURE — 3331090001 HH PPS REVENUE CREDIT

## 2017-12-25 PROCEDURE — 3331090002 HH PPS REVENUE DEBIT

## 2017-12-26 PROCEDURE — 3331090002 HH PPS REVENUE DEBIT

## 2017-12-26 PROCEDURE — 3331090001 HH PPS REVENUE CREDIT

## 2017-12-27 PROCEDURE — 3331090001 HH PPS REVENUE CREDIT

## 2017-12-27 PROCEDURE — 3331090002 HH PPS REVENUE DEBIT

## 2017-12-28 PROCEDURE — 3331090001 HH PPS REVENUE CREDIT

## 2017-12-28 PROCEDURE — 3331090002 HH PPS REVENUE DEBIT

## 2017-12-29 PROCEDURE — 3331090002 HH PPS REVENUE DEBIT

## 2017-12-29 PROCEDURE — 3331090001 HH PPS REVENUE CREDIT

## 2017-12-30 PROCEDURE — 3331090002 HH PPS REVENUE DEBIT

## 2017-12-30 PROCEDURE — 3331090001 HH PPS REVENUE CREDIT

## 2017-12-31 PROCEDURE — 3331090002 HH PPS REVENUE DEBIT

## 2017-12-31 PROCEDURE — 3331090001 HH PPS REVENUE CREDIT

## 2018-01-01 PROCEDURE — 3331090001 HH PPS REVENUE CREDIT

## 2018-01-01 PROCEDURE — 3331090002 HH PPS REVENUE DEBIT

## 2018-01-02 PROCEDURE — 3331090002 HH PPS REVENUE DEBIT

## 2018-01-02 PROCEDURE — 3331090001 HH PPS REVENUE CREDIT

## 2018-01-03 ENCOUNTER — HOME CARE VISIT (OUTPATIENT)
Dept: SCHEDULING | Facility: HOME HEALTH | Age: 83
End: 2018-01-03
Payer: MEDICARE

## 2018-01-03 VITALS
DIASTOLIC BLOOD PRESSURE: 82 MMHG | TEMPERATURE: 97.6 F | SYSTOLIC BLOOD PRESSURE: 150 MMHG | RESPIRATION RATE: 16 BRPM | HEART RATE: 68 BPM | OXYGEN SATURATION: 98 %

## 2018-01-03 PROCEDURE — 3331090001 HH PPS REVENUE CREDIT

## 2018-01-03 PROCEDURE — A4331 EXTENSION DRAINAGE TUBING: HCPCS

## 2018-01-03 PROCEDURE — 3331090002 HH PPS REVENUE DEBIT

## 2018-01-03 PROCEDURE — G0299 HHS/HOSPICE OF RN EA 15 MIN: HCPCS

## 2018-01-04 PROCEDURE — 3331090001 HH PPS REVENUE CREDIT

## 2018-01-04 PROCEDURE — 3331090002 HH PPS REVENUE DEBIT

## 2018-01-04 PROCEDURE — 3331090003 HH PPS REVENUE ADJ

## 2018-01-05 PROCEDURE — 3331090001 HH PPS REVENUE CREDIT

## 2018-01-05 PROCEDURE — 3331090002 HH PPS REVENUE DEBIT

## 2018-01-06 PROCEDURE — 3331090002 HH PPS REVENUE DEBIT

## 2018-01-06 PROCEDURE — 3331090001 HH PPS REVENUE CREDIT

## 2018-01-07 PROCEDURE — 3331090002 HH PPS REVENUE DEBIT

## 2018-01-07 PROCEDURE — 3331090001 HH PPS REVENUE CREDIT

## 2018-01-08 PROCEDURE — 3331090002 HH PPS REVENUE DEBIT

## 2018-01-08 PROCEDURE — 3331090001 HH PPS REVENUE CREDIT

## 2018-01-09 PROCEDURE — 3331090002 HH PPS REVENUE DEBIT

## 2018-01-09 PROCEDURE — 3331090001 HH PPS REVENUE CREDIT

## 2018-01-10 PROCEDURE — 3331090001 HH PPS REVENUE CREDIT

## 2018-01-10 PROCEDURE — 3331090002 HH PPS REVENUE DEBIT

## 2018-01-11 PROCEDURE — 3331090001 HH PPS REVENUE CREDIT

## 2018-01-11 PROCEDURE — 3331090002 HH PPS REVENUE DEBIT

## 2018-01-12 PROCEDURE — 3331090002 HH PPS REVENUE DEBIT

## 2018-01-12 PROCEDURE — 3331090001 HH PPS REVENUE CREDIT

## 2018-01-13 PROCEDURE — 3331090001 HH PPS REVENUE CREDIT

## 2018-01-13 PROCEDURE — 3331090002 HH PPS REVENUE DEBIT

## 2018-01-14 PROCEDURE — 3331090002 HH PPS REVENUE DEBIT

## 2018-01-14 PROCEDURE — 3331090001 HH PPS REVENUE CREDIT

## 2018-01-15 ENCOUNTER — HOME CARE VISIT (OUTPATIENT)
Dept: SCHEDULING | Facility: HOME HEALTH | Age: 83
End: 2018-01-15
Payer: MEDICARE

## 2018-01-15 VITALS
OXYGEN SATURATION: 98 % | HEART RATE: 80 BPM | TEMPERATURE: 98.1 F | DIASTOLIC BLOOD PRESSURE: 80 MMHG | RESPIRATION RATE: 20 BRPM | SYSTOLIC BLOOD PRESSURE: 120 MMHG

## 2018-01-15 PROCEDURE — G0299 HHS/HOSPICE OF RN EA 15 MIN: HCPCS

## 2018-01-15 PROCEDURE — 3331090001 HH PPS REVENUE CREDIT

## 2018-01-15 PROCEDURE — 3331090002 HH PPS REVENUE DEBIT

## 2018-01-15 PROCEDURE — 400014 HH F/U

## 2018-01-16 PROCEDURE — 3331090001 HH PPS REVENUE CREDIT

## 2018-01-16 PROCEDURE — A4314 CATH W/DRAINAGE 2-WAY LATEX: HCPCS

## 2018-01-16 PROCEDURE — 3331090002 HH PPS REVENUE DEBIT

## 2018-01-16 PROCEDURE — A4331 EXTENSION DRAINAGE TUBING: HCPCS

## 2018-01-17 PROCEDURE — 3331090001 HH PPS REVENUE CREDIT

## 2018-01-17 PROCEDURE — 3331090002 HH PPS REVENUE DEBIT

## 2018-01-18 PROCEDURE — 3331090001 HH PPS REVENUE CREDIT

## 2018-01-18 PROCEDURE — 3331090002 HH PPS REVENUE DEBIT

## 2018-01-19 PROCEDURE — 3331090002 HH PPS REVENUE DEBIT

## 2018-01-19 PROCEDURE — 3331090001 HH PPS REVENUE CREDIT

## 2018-01-20 PROCEDURE — 3331090001 HH PPS REVENUE CREDIT

## 2018-01-20 PROCEDURE — 3331090002 HH PPS REVENUE DEBIT

## 2018-01-21 PROCEDURE — 3331090001 HH PPS REVENUE CREDIT

## 2018-01-21 PROCEDURE — 3331090002 HH PPS REVENUE DEBIT

## 2018-01-22 PROCEDURE — 3331090001 HH PPS REVENUE CREDIT

## 2018-01-22 PROCEDURE — 3331090002 HH PPS REVENUE DEBIT

## 2018-01-23 PROCEDURE — 3331090002 HH PPS REVENUE DEBIT

## 2018-01-23 PROCEDURE — 3331090001 HH PPS REVENUE CREDIT

## 2018-01-24 PROCEDURE — 3331090002 HH PPS REVENUE DEBIT

## 2018-01-24 PROCEDURE — 3331090001 HH PPS REVENUE CREDIT

## 2018-01-25 PROCEDURE — 3331090001 HH PPS REVENUE CREDIT

## 2018-01-25 PROCEDURE — 3331090002 HH PPS REVENUE DEBIT

## 2018-01-26 PROCEDURE — 3331090001 HH PPS REVENUE CREDIT

## 2018-01-26 PROCEDURE — 3331090002 HH PPS REVENUE DEBIT

## 2018-01-27 PROCEDURE — 3331090002 HH PPS REVENUE DEBIT

## 2018-01-27 PROCEDURE — 3331090001 HH PPS REVENUE CREDIT

## 2018-01-28 PROCEDURE — 3331090002 HH PPS REVENUE DEBIT

## 2018-01-28 PROCEDURE — 3331090001 HH PPS REVENUE CREDIT

## 2018-01-29 PROCEDURE — 3331090002 HH PPS REVENUE DEBIT

## 2018-01-29 PROCEDURE — 3331090001 HH PPS REVENUE CREDIT

## 2018-01-30 PROCEDURE — 3331090002 HH PPS REVENUE DEBIT

## 2018-01-30 PROCEDURE — 3331090001 HH PPS REVENUE CREDIT

## 2018-01-31 PROCEDURE — 3331090002 HH PPS REVENUE DEBIT

## 2018-01-31 PROCEDURE — 3331090001 HH PPS REVENUE CREDIT

## 2018-02-01 PROCEDURE — 3331090002 HH PPS REVENUE DEBIT

## 2018-02-01 PROCEDURE — 3331090001 HH PPS REVENUE CREDIT

## 2018-02-02 PROCEDURE — 3331090002 HH PPS REVENUE DEBIT

## 2018-02-02 PROCEDURE — 3331090001 HH PPS REVENUE CREDIT

## 2018-02-03 PROCEDURE — 3331090001 HH PPS REVENUE CREDIT

## 2018-02-03 PROCEDURE — 3331090002 HH PPS REVENUE DEBIT

## 2018-02-04 PROCEDURE — 3331090002 HH PPS REVENUE DEBIT

## 2018-02-04 PROCEDURE — 3331090001 HH PPS REVENUE CREDIT

## 2018-02-05 PROCEDURE — 3331090002 HH PPS REVENUE DEBIT

## 2018-02-05 PROCEDURE — 3331090001 HH PPS REVENUE CREDIT

## 2018-02-06 PROCEDURE — 3331090002 HH PPS REVENUE DEBIT

## 2018-02-06 PROCEDURE — 3331090001 HH PPS REVENUE CREDIT

## 2018-02-07 PROCEDURE — 3331090002 HH PPS REVENUE DEBIT

## 2018-02-07 PROCEDURE — 3331090001 HH PPS REVENUE CREDIT

## 2018-02-08 PROCEDURE — 3331090001 HH PPS REVENUE CREDIT

## 2018-02-08 PROCEDURE — 3331090002 HH PPS REVENUE DEBIT

## 2018-02-09 PROCEDURE — 3331090002 HH PPS REVENUE DEBIT

## 2018-02-09 PROCEDURE — 3331090001 HH PPS REVENUE CREDIT

## 2018-02-10 PROCEDURE — 3331090002 HH PPS REVENUE DEBIT

## 2018-02-10 PROCEDURE — 3331090001 HH PPS REVENUE CREDIT

## 2018-02-11 PROCEDURE — 3331090001 HH PPS REVENUE CREDIT

## 2018-02-11 PROCEDURE — 3331090002 HH PPS REVENUE DEBIT

## 2018-02-12 PROCEDURE — 3331090001 HH PPS REVENUE CREDIT

## 2018-02-12 PROCEDURE — 3331090002 HH PPS REVENUE DEBIT

## 2018-02-13 ENCOUNTER — HOME CARE VISIT (OUTPATIENT)
Dept: SCHEDULING | Facility: HOME HEALTH | Age: 83
End: 2018-02-13
Payer: MEDICARE

## 2018-02-13 VITALS
OXYGEN SATURATION: 99 % | DIASTOLIC BLOOD PRESSURE: 76 MMHG | TEMPERATURE: 97.6 F | HEART RATE: 70 BPM | SYSTOLIC BLOOD PRESSURE: 140 MMHG | RESPIRATION RATE: 16 BRPM

## 2018-02-13 LAB
INR BLD: 1.5 (ref 0.9–1.1)
PT POC: 18.3 SECONDS (ref 11.8–14.9)

## 2018-02-13 PROCEDURE — G0299 HHS/HOSPICE OF RN EA 15 MIN: HCPCS

## 2018-02-13 PROCEDURE — 3331090002 HH PPS REVENUE DEBIT

## 2018-02-13 PROCEDURE — 3331090001 HH PPS REVENUE CREDIT

## 2018-02-14 PROCEDURE — 3331090002 HH PPS REVENUE DEBIT

## 2018-02-14 PROCEDURE — 3331090001 HH PPS REVENUE CREDIT

## 2018-02-15 PROCEDURE — 3331090001 HH PPS REVENUE CREDIT

## 2018-02-15 PROCEDURE — 3331090002 HH PPS REVENUE DEBIT

## 2018-02-16 PROCEDURE — 3331090001 HH PPS REVENUE CREDIT

## 2018-02-16 PROCEDURE — 3331090002 HH PPS REVENUE DEBIT

## 2018-02-17 PROCEDURE — 3331090001 HH PPS REVENUE CREDIT

## 2018-02-17 PROCEDURE — 3331090002 HH PPS REVENUE DEBIT

## 2018-02-18 PROCEDURE — 3331090001 HH PPS REVENUE CREDIT

## 2018-02-18 PROCEDURE — 3331090002 HH PPS REVENUE DEBIT

## 2018-02-19 PROCEDURE — 3331090001 HH PPS REVENUE CREDIT

## 2018-02-19 PROCEDURE — 3331090002 HH PPS REVENUE DEBIT

## 2018-02-20 PROCEDURE — 3331090001 HH PPS REVENUE CREDIT

## 2018-02-20 PROCEDURE — 3331090002 HH PPS REVENUE DEBIT

## 2018-02-21 PROCEDURE — 3331090001 HH PPS REVENUE CREDIT

## 2018-02-21 PROCEDURE — 3331090002 HH PPS REVENUE DEBIT

## 2018-02-22 PROCEDURE — 3331090001 HH PPS REVENUE CREDIT

## 2018-02-22 PROCEDURE — 3331090002 HH PPS REVENUE DEBIT

## 2018-02-23 PROCEDURE — 3331090001 HH PPS REVENUE CREDIT

## 2018-02-23 PROCEDURE — 3331090002 HH PPS REVENUE DEBIT

## 2018-02-24 PROCEDURE — 3331090002 HH PPS REVENUE DEBIT

## 2018-02-24 PROCEDURE — 3331090001 HH PPS REVENUE CREDIT

## 2018-02-25 PROCEDURE — 3331090001 HH PPS REVENUE CREDIT

## 2018-02-25 PROCEDURE — 3331090002 HH PPS REVENUE DEBIT

## 2018-02-26 PROCEDURE — 3331090001 HH PPS REVENUE CREDIT

## 2018-02-26 PROCEDURE — 3331090002 HH PPS REVENUE DEBIT

## 2018-02-27 PROCEDURE — 3331090002 HH PPS REVENUE DEBIT

## 2018-02-27 PROCEDURE — 3331090001 HH PPS REVENUE CREDIT

## 2018-02-28 PROCEDURE — 3331090002 HH PPS REVENUE DEBIT

## 2018-02-28 PROCEDURE — 3331090001 HH PPS REVENUE CREDIT

## 2018-03-01 PROCEDURE — 3331090001 HH PPS REVENUE CREDIT

## 2018-03-01 PROCEDURE — 3331090002 HH PPS REVENUE DEBIT

## 2018-03-02 PROCEDURE — 3331090001 HH PPS REVENUE CREDIT

## 2018-03-02 PROCEDURE — 3331090002 HH PPS REVENUE DEBIT

## 2018-03-03 PROCEDURE — 3331090002 HH PPS REVENUE DEBIT

## 2018-03-03 PROCEDURE — 3331090001 HH PPS REVENUE CREDIT

## 2018-03-04 PROCEDURE — 3331090002 HH PPS REVENUE DEBIT

## 2018-03-04 PROCEDURE — 3331090001 HH PPS REVENUE CREDIT

## 2018-03-05 ENCOUNTER — HOME CARE VISIT (OUTPATIENT)
Dept: SCHEDULING | Facility: HOME HEALTH | Age: 83
End: 2018-03-05
Payer: MEDICARE

## 2018-03-05 VITALS
SYSTOLIC BLOOD PRESSURE: 140 MMHG | TEMPERATURE: 97.7 F | DIASTOLIC BLOOD PRESSURE: 70 MMHG | RESPIRATION RATE: 16 BRPM | OXYGEN SATURATION: 97 % | HEART RATE: 70 BPM

## 2018-03-05 LAB
INR BLD: 3.2 (ref 0.9–1.1)
PT POC: 38.5 SECONDS (ref 11.8–14.9)

## 2018-03-05 PROCEDURE — G0299 HHS/HOSPICE OF RN EA 15 MIN: HCPCS

## 2018-03-05 PROCEDURE — 3331090002 HH PPS REVENUE DEBIT

## 2018-03-05 PROCEDURE — 3331090003 HH PPS REVENUE ADJ

## 2018-03-05 PROCEDURE — 3331090001 HH PPS REVENUE CREDIT

## 2018-03-06 PROCEDURE — 3331090001 HH PPS REVENUE CREDIT

## 2018-03-06 PROCEDURE — 3331090002 HH PPS REVENUE DEBIT

## 2018-03-07 PROCEDURE — 3331090002 HH PPS REVENUE DEBIT

## 2018-03-07 PROCEDURE — 3331090001 HH PPS REVENUE CREDIT

## 2018-03-08 PROCEDURE — 3331090001 HH PPS REVENUE CREDIT

## 2018-03-08 PROCEDURE — 3331090002 HH PPS REVENUE DEBIT

## 2018-03-09 PROCEDURE — 3331090002 HH PPS REVENUE DEBIT

## 2018-03-09 PROCEDURE — 3331090001 HH PPS REVENUE CREDIT

## 2018-03-10 PROCEDURE — 3331090002 HH PPS REVENUE DEBIT

## 2018-03-10 PROCEDURE — 3331090001 HH PPS REVENUE CREDIT

## 2018-03-11 PROCEDURE — 3331090002 HH PPS REVENUE DEBIT

## 2018-03-11 PROCEDURE — 3331090001 HH PPS REVENUE CREDIT

## 2018-03-12 PROCEDURE — 3331090001 HH PPS REVENUE CREDIT

## 2018-03-12 PROCEDURE — 3331090002 HH PPS REVENUE DEBIT

## 2018-03-13 PROCEDURE — 3331090001 HH PPS REVENUE CREDIT

## 2018-03-13 PROCEDURE — 3331090002 HH PPS REVENUE DEBIT

## 2018-03-14 PROCEDURE — 3331090002 HH PPS REVENUE DEBIT

## 2018-03-14 PROCEDURE — 3331090001 HH PPS REVENUE CREDIT

## 2018-03-15 ENCOUNTER — HOME CARE VISIT (OUTPATIENT)
Dept: SCHEDULING | Facility: HOME HEALTH | Age: 83
End: 2018-03-15
Payer: MEDICARE

## 2018-03-15 VITALS
HEART RATE: 82 BPM | OXYGEN SATURATION: 98 % | TEMPERATURE: 97.5 F | DIASTOLIC BLOOD PRESSURE: 77 MMHG | SYSTOLIC BLOOD PRESSURE: 144 MMHG

## 2018-03-15 LAB
INR, POC: 2.6 (ref 0.7–1.2)
PROTHROMBIN TIME, POC: 31.1 SECONDS (ref 6.5–11.9)

## 2018-03-15 PROCEDURE — 3331090001 HH PPS REVENUE CREDIT

## 2018-03-15 PROCEDURE — A4314 CATH W/DRAINAGE 2-WAY LATEX: HCPCS

## 2018-03-15 PROCEDURE — 400014 HH F/U

## 2018-03-15 PROCEDURE — A4331 EXTENSION DRAINAGE TUBING: HCPCS

## 2018-03-15 PROCEDURE — G0299 HHS/HOSPICE OF RN EA 15 MIN: HCPCS

## 2018-03-15 PROCEDURE — 3331090002 HH PPS REVENUE DEBIT

## 2018-03-16 PROCEDURE — 3331090002 HH PPS REVENUE DEBIT

## 2018-03-16 PROCEDURE — 3331090001 HH PPS REVENUE CREDIT

## 2018-03-17 PROCEDURE — 3331090002 HH PPS REVENUE DEBIT

## 2018-03-17 PROCEDURE — 3331090001 HH PPS REVENUE CREDIT

## 2018-03-18 PROCEDURE — 3331090002 HH PPS REVENUE DEBIT

## 2018-03-18 PROCEDURE — 3331090001 HH PPS REVENUE CREDIT

## 2018-03-19 PROCEDURE — 3331090002 HH PPS REVENUE DEBIT

## 2018-03-19 PROCEDURE — 3331090001 HH PPS REVENUE CREDIT

## 2018-03-20 PROCEDURE — 3331090002 HH PPS REVENUE DEBIT

## 2018-03-20 PROCEDURE — 3331090001 HH PPS REVENUE CREDIT

## 2018-03-21 PROCEDURE — 3331090001 HH PPS REVENUE CREDIT

## 2018-03-21 PROCEDURE — 3331090002 HH PPS REVENUE DEBIT

## 2018-03-22 PROCEDURE — 3331090002 HH PPS REVENUE DEBIT

## 2018-03-22 PROCEDURE — 3331090001 HH PPS REVENUE CREDIT

## 2018-03-23 PROCEDURE — 3331090002 HH PPS REVENUE DEBIT

## 2018-03-23 PROCEDURE — 3331090001 HH PPS REVENUE CREDIT

## 2018-03-24 PROCEDURE — 3331090001 HH PPS REVENUE CREDIT

## 2018-03-24 PROCEDURE — 3331090002 HH PPS REVENUE DEBIT

## 2018-03-25 PROCEDURE — 3331090001 HH PPS REVENUE CREDIT

## 2018-03-25 PROCEDURE — 3331090002 HH PPS REVENUE DEBIT

## 2018-03-26 PROCEDURE — 3331090002 HH PPS REVENUE DEBIT

## 2018-03-26 PROCEDURE — 3331090001 HH PPS REVENUE CREDIT

## 2018-03-27 PROCEDURE — 3331090001 HH PPS REVENUE CREDIT

## 2018-03-27 PROCEDURE — 3331090002 HH PPS REVENUE DEBIT

## 2018-03-28 PROCEDURE — 3331090001 HH PPS REVENUE CREDIT

## 2018-03-28 PROCEDURE — 3331090002 HH PPS REVENUE DEBIT

## 2018-03-29 PROCEDURE — 3331090002 HH PPS REVENUE DEBIT

## 2018-03-29 PROCEDURE — 3331090001 HH PPS REVENUE CREDIT

## 2018-03-30 PROCEDURE — 3331090002 HH PPS REVENUE DEBIT

## 2018-03-30 PROCEDURE — 3331090001 HH PPS REVENUE CREDIT

## 2018-03-31 PROCEDURE — 3331090001 HH PPS REVENUE CREDIT

## 2018-03-31 PROCEDURE — 3331090002 HH PPS REVENUE DEBIT

## 2018-04-01 PROCEDURE — 3331090001 HH PPS REVENUE CREDIT

## 2018-04-01 PROCEDURE — 3331090002 HH PPS REVENUE DEBIT

## 2018-04-02 PROCEDURE — 3331090001 HH PPS REVENUE CREDIT

## 2018-04-02 PROCEDURE — 3331090002 HH PPS REVENUE DEBIT

## 2018-04-03 PROCEDURE — 3331090001 HH PPS REVENUE CREDIT

## 2018-04-03 PROCEDURE — 3331090002 HH PPS REVENUE DEBIT

## 2018-04-04 ENCOUNTER — HOME CARE VISIT (OUTPATIENT)
Dept: HOME HEALTH SERVICES | Facility: HOME HEALTH | Age: 83
End: 2018-04-04
Payer: MEDICARE

## 2018-04-04 DIAGNOSIS — T45.7X1A ANTICOAGULANT-INDUCED BLEEDING (HCC): ICD-10-CM

## 2018-04-04 DIAGNOSIS — I48.20 CHRONIC ATRIAL FIBRILLATION (HCC): Primary | ICD-10-CM

## 2018-04-04 DIAGNOSIS — D68.9 ANTICOAGULANT-INDUCED BLEEDING (HCC): ICD-10-CM

## 2018-04-04 DIAGNOSIS — R31.0 GROSS HEMATURIA: ICD-10-CM

## 2018-04-04 LAB
INR, POC: 2.6 (ref 0.7–1.2)
PROTHROMBIN TIME, POC: 31 SECONDS (ref 6.5–11.9)

## 2018-04-04 PROCEDURE — G0299 HHS/HOSPICE OF RN EA 15 MIN: HCPCS

## 2018-04-04 PROCEDURE — 3331090001 HH PPS REVENUE CREDIT

## 2018-04-04 PROCEDURE — 3331090002 HH PPS REVENUE DEBIT

## 2018-04-05 VITALS
RESPIRATION RATE: 16 BRPM | DIASTOLIC BLOOD PRESSURE: 80 MMHG | TEMPERATURE: 97.6 F | OXYGEN SATURATION: 98 % | HEART RATE: 74 BPM | SYSTOLIC BLOOD PRESSURE: 139 MMHG

## 2018-04-05 PROCEDURE — 3331090002 HH PPS REVENUE DEBIT

## 2018-04-05 PROCEDURE — 3331090001 HH PPS REVENUE CREDIT

## 2018-04-05 NOTE — PROGRESS NOTES
HISTORY OF PRESENT ILLNESS  Tali Pratt is a 80 y.o. male. Blood in Urine    The history is provided by the patient and caregiver. This is a recurrent problem. The current episode started more than 2 days ago. The problem occurs intermittently. The problem has been gradually worsening. The patient is experiencing no pain. There has been no fever. He is not sexually active. There is no history of pyelonephritis. Associated symptoms include hematuria. Pertinent negatives include no chills, no sweats, no nausea, no vomiting, no discharge, no frequency, no hesitancy, no urgency, no flank pain, no vaginal discharge and no abdominal pain. The patient is not pregnant. He has tried nothing for the symptoms. His past medical history is significant for urological procedure, recurrent UTIs, urinary stasis, catheterization and urinary catheter problem. His past medical history does not include kidney stones. Anticoagulation  Patient here for followup of chronic anticoagulation. Indication: Atrial Fibrillation. Bleeding Signs/Symptoms:  Yes - hematuria. Thromboembolic Signs/Symptoms:  None. INR's are drawn regularly and have been therapeutic.   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    Mixed hyperlipidemia E78.2    Gross hematuria R31.0    Malaise and fatigue R53.81, R53.83    Hematuria R31.9    Anticoagulant-induced bleeding ([address] T45.7X1A, D68.9    BPH (benign prostatic hyperplasia) N40.0    Coagulopathy (HCC) D68.9    Acute blood loss anemia D62  Hydroureter, right N13.4    Acute cystitis with hematuria N30.01    Chest pain R07.9    Atrial fibrillation with RVR (HCC) I48.91    Hx of mechanical aortic valve replacement Z95.2    Hypotension I95.9    Bacteremia due to Gram-negative bacteria R78.81    Encephalopathy -- acute G93.40    Herpes infection B00.9     Patient Active Problem List    Diagnosis Date Noted    Herpes infection 12/22/2016    Encephalopathy -- acute 12/08/2016    Bacteremia due to Gram-negative bacteria 12/04/2016    Atrial fibrillation with RVR (HCC) 12/03/2016    Hypotension 12/03/2016    Chest pain 10/02/2016    Acute cystitis with hematuria 10/01/2016    Coagulopathy ([address] 09/30/2016    Acute blood loss anemia 09/30/2016    Hydroureter, right 09/30/2016    Anticoagulant-induced bleeding ([address] 09/27/2016    BPH (benign prostatic hyperplasia) 09/27/2016    Hematuria 07/24/2015    Disease of teeth 07/15/2015    Peripheral vascular disease ([address] 07/15/2015    Degenerative cervical disc 07/15/2015    Senile dementia with delusional features ([address] 07/15/2015    Mixed hyperlipidemia 07/15/2015    Gross hematuria 07/15/2015    Malaise and fatigue 07/15/2015    Atrial fibrillation (HCC) 03/27/2014    Anuria and oliguria 03/27/2014    HTN (hypertension) 03/27/2014    Aortic valve disorders 03/27/2014    Dyslipidemia 03/27/2014    Cervicalgia 03/27/2014    Hx of mechanical aortic valve replacement 03/27/2014     Current Outpatient Prescriptions   Medication Sig Dispense Refill    warfarin (COUMADIN) 5 mg tablet Take 5 mg by mouth daily. Take one tablet (5mg) Mon, Tue, Wed, Fri & Sat. Take two tablets (10mg) Sun & Thur.  traMADol (ULTRAM) 50 mg tablet Take 50 mg by mouth every six (6) hours as needed for Pain.       warfarin (COUMADIN) 5 mg tablet TAKE 10 MG (2 TABLETS) BY MOUTH ON SATURDAY AND SUNDAY AND ONLY TAKE 5 MG (1 TABLET) BY MOUTH MON-FRIDAY (Patient taking differently: TAKE 10 MG (2 TABLETS) BY MOUTH ON SATURDAY AND ONLY TAKE 5 MG (1 TABLET) BY MOUTH MON-FRIDAY AND SUNDAY) 45 Tab 1    folic acid (FOLVITE) 1 mg tablet Take 1 mg by mouth daily.  ergocalciferol (ERGOCALCIFEROL) 50,000 unit capsule Take 50,000 Units by mouth daily.  povidone-iodine (BETADINE) 10 % ointment Apply 1 Each to affected area daily. apply to tip of penis daily      donepezil (ARICEPT) 10 mg tablet Take 10 mg by mouth nightly.  docusate sodium (COLACE) 100 mg capsule Take 100 mg by mouth two (2) times a day.  escitalopram oxalate (LEXAPRO) 10 mg tablet TAKE 1 TABLET BY MOUTH DAILY 30 Tab 4    tamsulosin (FLOMAX) 0.4 mg capsule TAKE 1 CAPSULE BY MOUTH DAILY 90 Cap 0    lidocaine (XYLOCAINE) 2 % jelly Apply 1 mL to affected area as needed. Indications: LOCAL ANESTHESIA FOR URETHRAL PAIN 30 mL 1    nystatin (MYCOSTATIN) powder Apply  to affected area four (4) times daily. 1 Bottle 1    mirtazapine (REMERON) 15 mg tablet Take 1 Tab by mouth nightly. 30 Tab 5    simethicone (MYLICON) 80 mg chewable tablet Take 80 mg by mouth daily as needed. 1 tablet 3 x day as needed for bloating/gas      omeprazole (PRILOSEC) 40 mg capsule Take 1 Cap by mouth daily. 90 Cap 0    ferrous sulfate 325 mg (65 mg iron) tablet Take 1 Tab by mouth two (2) times a day. 1 Tab 0    finasteride (PROSCAR) 5 mg tablet Take 1 Tab by mouth daily. 1 Tab 0    metoprolol tartrate (LOPRESSOR) 100 mg IR tablet Take 1 Tab by mouth two (2) times a day. 1 Tab 0    calcium 500 mg tab Take  by mouth.        Allergies   Allergen Reactions    Pcn [Penicillins] Rash     Past Medical History:   Diagnosis Date    Acute ischemic stroke ([facility] [address] 7/15/2015    Aortic valve disorder     Atrial fibrillation (HCC)     CAD (coronary artery disease)     Degenerative cervical disc 7/15/2015    Disease of teeth 7/15/2015    Gross hematuria 7/15/2015    Heart disease     Dr. Aleah Ponce cardiologist    Hematuria, microscopic     Hypertension     Ill-defined condition     high cholesterol    Kidney disease     Malaise and fatigue 7/15/2015    Mixed hyperlipidemia 7/15/2015    Peripheral vascular disease ([facility] [address] 7/15/2015    Psychiatric disorder     depression    Senile dementia with delusional features ([facility] [address] 7/15/2015    Stroke [facility])      Past Surgical History:   Procedure Laterality Date    CARDIAC SURG PROCEDURE UNLIST      HX AORTIC VALVE REPLACEMENT      HX OTHER SURGICAL      hemmorhoids    HX OTHER SURGICAL  Jan. 2014    pyelonephritis    HX OTHER SURGICAL  2010    atrial fibrillation    HX OTHER SURGICAL  2007    open heart surgery     Family History   Problem Relation Age of Onset    Heart Disease Mother     Hypertension Mother     Hypertension Father     Dementia Maternal Aunt     Hypertension Maternal Aunt     Heart Disease Maternal Aunt     Diabetes Maternal Aunt     Heart Disease Paternal Uncle     Dementia Other     Hypertension Other     Heart Disease Other     Diabetes Other      Social History   Substance Use Topics    Smoking status: Former Smoker    Smokeless tobacco: Never Used    Alcohol use No      Lab Results   Component Value Date/Time    INR 1.7 (H) 02/23/2018 12:08 PM    INR 1.5 (H) 11/21/2017 12:30 PM    INR 2.4 (H) 07/18/2017 03:04 PM    INR, (POC) 2.6 (A) 04/04/2018 04:10 PM    INR, (POC) 2.6 (A) 03/15/2018 01:02 PM    INR, (POC) 1.5 (A) 11/21/2017 12:30 PM    INR POC 3.2 (A) 03/05/2018 03:27 PM    INR POC 1.5 (A) 02/13/2018 02:44 PM    INR POC 3.4 (A) 12/18/2017 01:29 PM    Prothrombin time 17.0 (H) 02/23/2018 12:08 PM    Prothrombin time 15.8 (H) 11/21/2017 12:30 PM    Prothrombin time 24.4 (H) 07/18/2017 03:04 PM         Review of Systems   Constitutional: Positive for malaise/fatigue and weight loss. Negative for chills. Respiratory: Negative for hemoptysis and shortness of breath. Gastrointestinal: Negative for abdominal pain, nausea and vomiting. Genitourinary: Positive for hematuria. Negative for flank pain, frequency, hesitancy, urgency and vaginal discharge. Endo/Heme/Allergies: Bruises/bleeds easily. Physical Exam   Constitutional: He is oriented to person, place, and time. He appears well-developed and well-nourished. HENT:   Head: Normocephalic. Nose: Nose normal.   Mouth/Throat: Uvula is midline, oropharynx is clear and moist and mucous membranes are normal.   Eyes: Conjunctivae, EOM and lids are normal. Pupils are equal, round, and reactive to light. Neck: Normal range of motion. Neck supple. Cardiovascular: Normal rate, regular rhythm and normal heart sounds. Pulmonary/Chest: Effort normal and breath sounds normal.   Abdominal: Soft. Bowel sounds are normal. There is generalized tenderness. Musculoskeletal: Normal range of motion. Neurological: He is alert and oriented to person, place, and time. He has normal strength. Skin: Skin is warm, dry and intact. Psychiatric: He has a normal mood and affect. His speech is normal and behavior is normal. Judgment and thought content normal. Cognition and memory are normal.       ASSESSMENT and PLAN    ICD-10-CM ICD-9-CM    1. Chronic atrial fibrillation (HCC) I48.2 427.31    2. Anticoagulant-induced bleeding (HCC) T45.7X1A 286.6     D68.9     3.  Gross hematuria R31.0 599.71

## 2018-04-06 PROCEDURE — 3331090002 HH PPS REVENUE DEBIT

## 2018-04-06 PROCEDURE — 3331090001 HH PPS REVENUE CREDIT

## 2018-04-07 PROCEDURE — 3331090001 HH PPS REVENUE CREDIT

## 2018-04-07 PROCEDURE — 3331090002 HH PPS REVENUE DEBIT

## 2018-04-08 PROCEDURE — 3331090002 HH PPS REVENUE DEBIT

## 2018-04-08 PROCEDURE — 3331090001 HH PPS REVENUE CREDIT

## 2018-04-09 PROCEDURE — 3331090001 HH PPS REVENUE CREDIT

## 2018-04-09 PROCEDURE — 3331090002 HH PPS REVENUE DEBIT

## 2018-04-10 PROCEDURE — 3331090002 HH PPS REVENUE DEBIT

## 2018-04-10 PROCEDURE — 3331090001 HH PPS REVENUE CREDIT

## 2018-04-11 PROCEDURE — 3331090002 HH PPS REVENUE DEBIT

## 2018-04-11 PROCEDURE — 3331090001 HH PPS REVENUE CREDIT

## 2018-04-12 ENCOUNTER — HOME CARE VISIT (OUTPATIENT)
Dept: SCHEDULING | Facility: HOME HEALTH | Age: 83
End: 2018-04-12
Payer: MEDICARE

## 2018-04-12 ENCOUNTER — HOME CARE VISIT (OUTPATIENT)
Dept: HOME HEALTH SERVICES | Facility: HOME HEALTH | Age: 83
End: 2018-04-12
Payer: MEDICARE

## 2018-04-12 VITALS
SYSTOLIC BLOOD PRESSURE: 150 MMHG | TEMPERATURE: 98 F | RESPIRATION RATE: 18 BRPM | DIASTOLIC BLOOD PRESSURE: 90 MMHG | HEART RATE: 56 BPM

## 2018-04-12 PROCEDURE — 3331090001 HH PPS REVENUE CREDIT

## 2018-04-12 PROCEDURE — G0151 HHCP-SERV OF PT,EA 15 MIN: HCPCS

## 2018-04-12 PROCEDURE — 3331090002 HH PPS REVENUE DEBIT

## 2018-04-13 PROCEDURE — 3331090001 HH PPS REVENUE CREDIT

## 2018-04-13 PROCEDURE — 3331090002 HH PPS REVENUE DEBIT

## 2018-04-14 PROCEDURE — 3331090001 HH PPS REVENUE CREDIT

## 2018-04-14 PROCEDURE — 3331090002 HH PPS REVENUE DEBIT

## 2018-04-15 PROCEDURE — 3331090001 HH PPS REVENUE CREDIT

## 2018-04-15 PROCEDURE — 3331090002 HH PPS REVENUE DEBIT

## 2018-04-16 ENCOUNTER — HOME CARE VISIT (OUTPATIENT)
Dept: SCHEDULING | Facility: HOME HEALTH | Age: 83
End: 2018-04-16
Payer: MEDICARE

## 2018-04-16 VITALS
TEMPERATURE: 97.1 F | RESPIRATION RATE: 18 BRPM | HEART RATE: 78 BPM | DIASTOLIC BLOOD PRESSURE: 80 MMHG | SYSTOLIC BLOOD PRESSURE: 160 MMHG

## 2018-04-16 PROCEDURE — 3331090001 HH PPS REVENUE CREDIT

## 2018-04-16 PROCEDURE — 3331090002 HH PPS REVENUE DEBIT

## 2018-04-16 PROCEDURE — G0157 HHC PT ASSISTANT EA 15: HCPCS

## 2018-04-17 PROCEDURE — 3331090001 HH PPS REVENUE CREDIT

## 2018-04-17 PROCEDURE — 3331090002 HH PPS REVENUE DEBIT

## 2018-04-18 ENCOUNTER — HOME CARE VISIT (OUTPATIENT)
Dept: SCHEDULING | Facility: HOME HEALTH | Age: 83
End: 2018-04-18
Payer: MEDICARE

## 2018-04-18 VITALS
RESPIRATION RATE: 18 BRPM | DIASTOLIC BLOOD PRESSURE: 78 MMHG | HEART RATE: 78 BPM | TEMPERATURE: 96.8 F | SYSTOLIC BLOOD PRESSURE: 144 MMHG

## 2018-04-18 PROCEDURE — 3331090002 HH PPS REVENUE DEBIT

## 2018-04-18 PROCEDURE — G0157 HHC PT ASSISTANT EA 15: HCPCS

## 2018-04-18 PROCEDURE — 3331090001 HH PPS REVENUE CREDIT

## 2018-04-19 PROCEDURE — 3331090001 HH PPS REVENUE CREDIT

## 2018-04-19 PROCEDURE — 3331090002 HH PPS REVENUE DEBIT

## 2018-04-20 PROCEDURE — 3331090002 HH PPS REVENUE DEBIT

## 2018-04-20 PROCEDURE — 3331090001 HH PPS REVENUE CREDIT

## 2018-04-21 PROCEDURE — 3331090001 HH PPS REVENUE CREDIT

## 2018-04-21 PROCEDURE — 3331090002 HH PPS REVENUE DEBIT

## 2018-04-22 PROCEDURE — 3331090001 HH PPS REVENUE CREDIT

## 2018-04-22 PROCEDURE — 3331090002 HH PPS REVENUE DEBIT

## 2018-04-23 PROCEDURE — 3331090001 HH PPS REVENUE CREDIT

## 2018-04-23 PROCEDURE — 3331090002 HH PPS REVENUE DEBIT

## 2018-04-24 PROCEDURE — 3331090001 HH PPS REVENUE CREDIT

## 2018-04-24 PROCEDURE — 3331090002 HH PPS REVENUE DEBIT

## 2018-04-25 ENCOUNTER — HOME CARE VISIT (OUTPATIENT)
Dept: SCHEDULING | Facility: HOME HEALTH | Age: 83
End: 2018-04-25
Payer: MEDICARE

## 2018-04-25 VITALS
DIASTOLIC BLOOD PRESSURE: 70 MMHG | TEMPERATURE: 97.2 F | HEART RATE: 72 BPM | RESPIRATION RATE: 18 BRPM | SYSTOLIC BLOOD PRESSURE: 132 MMHG

## 2018-04-25 PROCEDURE — 3331090001 HH PPS REVENUE CREDIT

## 2018-04-25 PROCEDURE — 3331090002 HH PPS REVENUE DEBIT

## 2018-04-25 PROCEDURE — G0157 HHC PT ASSISTANT EA 15: HCPCS

## 2018-04-26 PROCEDURE — 3331090002 HH PPS REVENUE DEBIT

## 2018-04-26 PROCEDURE — 3331090001 HH PPS REVENUE CREDIT

## 2018-04-27 ENCOUNTER — HOME CARE VISIT (OUTPATIENT)
Dept: SCHEDULING | Facility: HOME HEALTH | Age: 83
End: 2018-04-27
Payer: MEDICARE

## 2018-04-27 VITALS
DIASTOLIC BLOOD PRESSURE: 76 MMHG | HEART RATE: 78 BPM | TEMPERATURE: 97.7 F | RESPIRATION RATE: 18 BRPM | SYSTOLIC BLOOD PRESSURE: 130 MMHG

## 2018-04-27 PROCEDURE — 3331090002 HH PPS REVENUE DEBIT

## 2018-04-27 PROCEDURE — G0157 HHC PT ASSISTANT EA 15: HCPCS

## 2018-04-27 PROCEDURE — 3331090001 HH PPS REVENUE CREDIT

## 2018-04-28 PROCEDURE — 3331090001 HH PPS REVENUE CREDIT

## 2018-04-28 PROCEDURE — 3331090002 HH PPS REVENUE DEBIT

## 2018-04-29 PROCEDURE — 3331090002 HH PPS REVENUE DEBIT

## 2018-04-29 PROCEDURE — 3331090001 HH PPS REVENUE CREDIT

## 2018-04-30 ENCOUNTER — HOME CARE VISIT (OUTPATIENT)
Dept: SCHEDULING | Facility: HOME HEALTH | Age: 83
End: 2018-04-30
Payer: MEDICARE

## 2018-04-30 VITALS
DIASTOLIC BLOOD PRESSURE: 70 MMHG | HEART RATE: 72 BPM | RESPIRATION RATE: 18 BRPM | SYSTOLIC BLOOD PRESSURE: 128 MMHG | TEMPERATURE: 96 F

## 2018-04-30 PROCEDURE — G0157 HHC PT ASSISTANT EA 15: HCPCS

## 2018-04-30 PROCEDURE — 3331090002 HH PPS REVENUE DEBIT

## 2018-04-30 PROCEDURE — 3331090001 HH PPS REVENUE CREDIT

## 2018-05-01 ENCOUNTER — HOME CARE VISIT (OUTPATIENT)
Dept: SCHEDULING | Facility: HOME HEALTH | Age: 83
End: 2018-05-01
Payer: MEDICARE

## 2018-05-01 VITALS
SYSTOLIC BLOOD PRESSURE: 133 MMHG | HEART RATE: 76 BPM | TEMPERATURE: 97.7 F | RESPIRATION RATE: 18 BRPM | OXYGEN SATURATION: 94 % | DIASTOLIC BLOOD PRESSURE: 78 MMHG

## 2018-05-01 VITALS
HEART RATE: 65 BPM | DIASTOLIC BLOOD PRESSURE: 85 MMHG | SYSTOLIC BLOOD PRESSURE: 147 MMHG | TEMPERATURE: 97.1 F | RESPIRATION RATE: 16 BRPM

## 2018-05-01 LAB
INR BLD: 2.7 (ref 0.9–1.1)
PT POC: 33 SECONDS (ref 11.8–14.9)

## 2018-05-01 PROCEDURE — 3331090002 HH PPS REVENUE DEBIT

## 2018-05-01 PROCEDURE — G0299 HHS/HOSPICE OF RN EA 15 MIN: HCPCS

## 2018-05-01 PROCEDURE — 3331090001 HH PPS REVENUE CREDIT

## 2018-05-01 PROCEDURE — G0151 HHCP-SERV OF PT,EA 15 MIN: HCPCS

## 2018-05-02 PROCEDURE — A4333 URINARY CATH ANCHOR DEVICE: HCPCS

## 2018-05-02 PROCEDURE — 3331090002 HH PPS REVENUE DEBIT

## 2018-05-02 PROCEDURE — 3331090001 HH PPS REVENUE CREDIT

## 2018-05-02 PROCEDURE — A4314 CATH W/DRAINAGE 2-WAY LATEX: HCPCS

## 2018-05-03 PROCEDURE — 3331090002 HH PPS REVENUE DEBIT

## 2018-05-03 PROCEDURE — 3331090001 HH PPS REVENUE CREDIT

## 2018-05-04 PROCEDURE — 3331090003 HH PPS REVENUE ADJ

## 2018-05-04 PROCEDURE — 3331090001 HH PPS REVENUE CREDIT

## 2018-05-04 PROCEDURE — 3331090002 HH PPS REVENUE DEBIT

## 2018-05-05 PROCEDURE — 3331090002 HH PPS REVENUE DEBIT

## 2018-05-05 PROCEDURE — 3331090001 HH PPS REVENUE CREDIT

## 2018-05-06 PROCEDURE — 3331090002 HH PPS REVENUE DEBIT

## 2018-05-06 PROCEDURE — 3331090001 HH PPS REVENUE CREDIT

## 2018-05-07 PROCEDURE — 3331090001 HH PPS REVENUE CREDIT

## 2018-05-07 PROCEDURE — 3331090002 HH PPS REVENUE DEBIT

## 2018-05-08 ENCOUNTER — HOME CARE VISIT (OUTPATIENT)
Dept: HOME HEALTH SERVICES | Facility: HOME HEALTH | Age: 83
End: 2018-05-08
Payer: MEDICARE

## 2018-05-08 PROCEDURE — 3331090001 HH PPS REVENUE CREDIT

## 2018-05-08 PROCEDURE — 3331090002 HH PPS REVENUE DEBIT

## 2018-05-09 PROCEDURE — 3331090001 HH PPS REVENUE CREDIT

## 2018-05-09 PROCEDURE — 3331090002 HH PPS REVENUE DEBIT

## 2018-05-10 PROCEDURE — 3331090001 HH PPS REVENUE CREDIT

## 2018-05-10 PROCEDURE — 3331090002 HH PPS REVENUE DEBIT

## 2018-05-11 ENCOUNTER — HOME CARE VISIT (OUTPATIENT)
Dept: HOME HEALTH SERVICES | Facility: HOME HEALTH | Age: 83
End: 2018-05-11
Payer: MEDICARE

## 2018-05-11 PROCEDURE — 3331090002 HH PPS REVENUE DEBIT

## 2018-05-11 PROCEDURE — 3331090001 HH PPS REVENUE CREDIT

## 2018-05-12 ENCOUNTER — HOME CARE VISIT (OUTPATIENT)
Dept: SCHEDULING | Facility: HOME HEALTH | Age: 83
End: 2018-05-12
Payer: MEDICARE

## 2018-05-12 PROCEDURE — 3331090001 HH PPS REVENUE CREDIT

## 2018-05-12 PROCEDURE — G0151 HHCP-SERV OF PT,EA 15 MIN: HCPCS

## 2018-05-12 PROCEDURE — 400014 HH F/U

## 2018-05-12 PROCEDURE — 3331090002 HH PPS REVENUE DEBIT

## 2018-05-13 VITALS
TEMPERATURE: 97.8 F | SYSTOLIC BLOOD PRESSURE: 130 MMHG | DIASTOLIC BLOOD PRESSURE: 70 MMHG | RESPIRATION RATE: 18 BRPM | HEART RATE: 68 BPM

## 2018-05-13 PROCEDURE — 3331090002 HH PPS REVENUE DEBIT

## 2018-05-13 PROCEDURE — 3331090001 HH PPS REVENUE CREDIT

## 2018-05-14 PROCEDURE — 3331090001 HH PPS REVENUE CREDIT

## 2018-05-14 PROCEDURE — 3331090002 HH PPS REVENUE DEBIT

## 2018-05-15 ENCOUNTER — HOME CARE VISIT (OUTPATIENT)
Dept: SCHEDULING | Facility: HOME HEALTH | Age: 83
End: 2018-05-15
Payer: MEDICARE

## 2018-05-15 VITALS — TEMPERATURE: 97.7 F | HEART RATE: 68 BPM | DIASTOLIC BLOOD PRESSURE: 64 MMHG | SYSTOLIC BLOOD PRESSURE: 132 MMHG

## 2018-05-15 PROCEDURE — 3331090001 HH PPS REVENUE CREDIT

## 2018-05-15 PROCEDURE — G0152 HHCP-SERV OF OT,EA 15 MIN: HCPCS

## 2018-05-15 PROCEDURE — 3331090002 HH PPS REVENUE DEBIT

## 2018-05-16 ENCOUNTER — HOME CARE VISIT (OUTPATIENT)
Dept: SCHEDULING | Facility: HOME HEALTH | Age: 83
End: 2018-05-16
Payer: MEDICARE

## 2018-05-16 VITALS
SYSTOLIC BLOOD PRESSURE: 138 MMHG | DIASTOLIC BLOOD PRESSURE: 72 MMHG | TEMPERATURE: 96.8 F | HEART RATE: 78 BPM | RESPIRATION RATE: 18 BRPM

## 2018-05-16 PROCEDURE — 3331090002 HH PPS REVENUE DEBIT

## 2018-05-16 PROCEDURE — 3331090001 HH PPS REVENUE CREDIT

## 2018-05-16 PROCEDURE — G0157 HHC PT ASSISTANT EA 15: HCPCS

## 2018-05-17 PROCEDURE — 3331090002 HH PPS REVENUE DEBIT

## 2018-05-17 PROCEDURE — 3331090001 HH PPS REVENUE CREDIT

## 2018-05-18 ENCOUNTER — HOME CARE VISIT (OUTPATIENT)
Dept: SCHEDULING | Facility: HOME HEALTH | Age: 83
End: 2018-05-18
Payer: MEDICARE

## 2018-05-18 VITALS — TEMPERATURE: 97.6 F | HEART RATE: 84 BPM

## 2018-05-18 PROCEDURE — G0157 HHC PT ASSISTANT EA 15: HCPCS

## 2018-05-18 PROCEDURE — 3331090002 HH PPS REVENUE DEBIT

## 2018-05-18 PROCEDURE — 3331090001 HH PPS REVENUE CREDIT

## 2018-05-19 PROCEDURE — 3331090001 HH PPS REVENUE CREDIT

## 2018-05-19 PROCEDURE — 3331090002 HH PPS REVENUE DEBIT

## 2018-05-20 PROCEDURE — 3331090001 HH PPS REVENUE CREDIT

## 2018-05-20 PROCEDURE — 3331090002 HH PPS REVENUE DEBIT

## 2018-05-21 ENCOUNTER — HOME CARE VISIT (OUTPATIENT)
Dept: SCHEDULING | Facility: HOME HEALTH | Age: 83
End: 2018-05-21
Payer: MEDICARE

## 2018-05-21 VITALS
TEMPERATURE: 98.1 F | SYSTOLIC BLOOD PRESSURE: 137 MMHG | HEART RATE: 62 BPM | OXYGEN SATURATION: 97 % | RESPIRATION RATE: 16 BRPM | DIASTOLIC BLOOD PRESSURE: 72 MMHG

## 2018-05-21 VITALS
RESPIRATION RATE: 18 BRPM | TEMPERATURE: 97.6 F | DIASTOLIC BLOOD PRESSURE: 76 MMHG | HEART RATE: 84 BPM | SYSTOLIC BLOOD PRESSURE: 132 MMHG

## 2018-05-21 PROCEDURE — G0299 HHS/HOSPICE OF RN EA 15 MIN: HCPCS

## 2018-05-21 PROCEDURE — G0157 HHC PT ASSISTANT EA 15: HCPCS

## 2018-05-21 PROCEDURE — 3331090001 HH PPS REVENUE CREDIT

## 2018-05-21 PROCEDURE — 3331090002 HH PPS REVENUE DEBIT

## 2018-05-22 ENCOUNTER — HOME CARE VISIT (OUTPATIENT)
Dept: SCHEDULING | Facility: HOME HEALTH | Age: 83
End: 2018-05-22
Payer: MEDICARE

## 2018-05-22 VITALS — SYSTOLIC BLOOD PRESSURE: 124 MMHG | TEMPERATURE: 97.9 F | HEART RATE: 80 BPM | DIASTOLIC BLOOD PRESSURE: 64 MMHG

## 2018-05-22 PROCEDURE — 3331090002 HH PPS REVENUE DEBIT

## 2018-05-22 PROCEDURE — G0152 HHCP-SERV OF OT,EA 15 MIN: HCPCS

## 2018-05-22 PROCEDURE — 3331090001 HH PPS REVENUE CREDIT

## 2018-05-23 ENCOUNTER — HOME CARE VISIT (OUTPATIENT)
Dept: SCHEDULING | Facility: HOME HEALTH | Age: 83
End: 2018-05-23
Payer: MEDICARE

## 2018-05-23 VITALS
SYSTOLIC BLOOD PRESSURE: 130 MMHG | RESPIRATION RATE: 18 BRPM | HEART RATE: 78 BPM | TEMPERATURE: 97.8 F | DIASTOLIC BLOOD PRESSURE: 70 MMHG

## 2018-05-23 PROCEDURE — G0157 HHC PT ASSISTANT EA 15: HCPCS

## 2018-05-23 PROCEDURE — 3331090002 HH PPS REVENUE DEBIT

## 2018-05-23 PROCEDURE — 3331090001 HH PPS REVENUE CREDIT

## 2018-05-24 ENCOUNTER — HOME CARE VISIT (OUTPATIENT)
Dept: SCHEDULING | Facility: HOME HEALTH | Age: 83
End: 2018-05-24
Payer: MEDICARE

## 2018-05-24 PROCEDURE — 3331090002 HH PPS REVENUE DEBIT

## 2018-05-24 PROCEDURE — 3331090001 HH PPS REVENUE CREDIT

## 2018-05-25 PROCEDURE — 3331090002 HH PPS REVENUE DEBIT

## 2018-05-25 PROCEDURE — 3331090001 HH PPS REVENUE CREDIT

## 2018-05-26 PROCEDURE — 3331090002 HH PPS REVENUE DEBIT

## 2018-05-26 PROCEDURE — 3331090001 HH PPS REVENUE CREDIT

## 2018-05-27 PROCEDURE — 3331090002 HH PPS REVENUE DEBIT

## 2018-05-27 PROCEDURE — 3331090001 HH PPS REVENUE CREDIT

## 2018-05-28 PROCEDURE — 3331090002 HH PPS REVENUE DEBIT

## 2018-05-28 PROCEDURE — 3331090001 HH PPS REVENUE CREDIT

## 2018-05-29 ENCOUNTER — HOME CARE VISIT (OUTPATIENT)
Dept: SCHEDULING | Facility: HOME HEALTH | Age: 83
End: 2018-05-29
Payer: MEDICARE

## 2018-05-29 VITALS
DIASTOLIC BLOOD PRESSURE: 70 MMHG | TEMPERATURE: 97.8 F | RESPIRATION RATE: 18 BRPM | HEART RATE: 78 BPM | SYSTOLIC BLOOD PRESSURE: 128 MMHG

## 2018-05-29 VITALS — SYSTOLIC BLOOD PRESSURE: 122 MMHG | HEART RATE: 68 BPM | DIASTOLIC BLOOD PRESSURE: 64 MMHG | TEMPERATURE: 98 F

## 2018-05-29 PROCEDURE — G0152 HHCP-SERV OF OT,EA 15 MIN: HCPCS

## 2018-05-29 PROCEDURE — 3331090001 HH PPS REVENUE CREDIT

## 2018-05-29 PROCEDURE — 3331090002 HH PPS REVENUE DEBIT

## 2018-05-29 PROCEDURE — G0157 HHC PT ASSISTANT EA 15: HCPCS

## 2018-05-30 PROCEDURE — 3331090001 HH PPS REVENUE CREDIT

## 2018-05-30 PROCEDURE — 3331090002 HH PPS REVENUE DEBIT

## 2018-05-31 ENCOUNTER — HOME CARE VISIT (OUTPATIENT)
Dept: SCHEDULING | Facility: HOME HEALTH | Age: 83
End: 2018-05-31
Payer: MEDICARE

## 2018-05-31 VITALS
TEMPERATURE: 98.1 F | RESPIRATION RATE: 20 BRPM | HEART RATE: 72 BPM | SYSTOLIC BLOOD PRESSURE: 122 MMHG | DIASTOLIC BLOOD PRESSURE: 90 MMHG

## 2018-05-31 PROCEDURE — 3331090001 HH PPS REVENUE CREDIT

## 2018-05-31 PROCEDURE — 3331090002 HH PPS REVENUE DEBIT

## 2018-05-31 PROCEDURE — G0152 HHCP-SERV OF OT,EA 15 MIN: HCPCS

## 2018-05-31 PROCEDURE — G0151 HHCP-SERV OF PT,EA 15 MIN: HCPCS

## 2018-06-01 PROCEDURE — 3331090002 HH PPS REVENUE DEBIT

## 2018-06-01 PROCEDURE — 3331090001 HH PPS REVENUE CREDIT

## 2018-06-02 PROCEDURE — 3331090001 HH PPS REVENUE CREDIT

## 2018-06-02 PROCEDURE — 3331090002 HH PPS REVENUE DEBIT

## 2018-06-03 PROCEDURE — 3331090001 HH PPS REVENUE CREDIT

## 2018-06-03 PROCEDURE — 3331090002 HH PPS REVENUE DEBIT

## 2018-06-04 PROCEDURE — 3331090002 HH PPS REVENUE DEBIT

## 2018-06-04 PROCEDURE — 3331090001 HH PPS REVENUE CREDIT

## 2018-06-05 PROCEDURE — 3331090002 HH PPS REVENUE DEBIT

## 2018-06-05 PROCEDURE — 3331090001 HH PPS REVENUE CREDIT

## 2018-06-06 PROCEDURE — 3331090002 HH PPS REVENUE DEBIT

## 2018-06-06 PROCEDURE — 3331090001 HH PPS REVENUE CREDIT

## 2018-06-07 ENCOUNTER — HOME CARE VISIT (OUTPATIENT)
Dept: SCHEDULING | Facility: HOME HEALTH | Age: 83
End: 2018-06-07
Payer: MEDICARE

## 2018-06-07 VITALS
HEART RATE: 70 BPM | SYSTOLIC BLOOD PRESSURE: 110 MMHG | DIASTOLIC BLOOD PRESSURE: 70 MMHG | RESPIRATION RATE: 20 BRPM | TEMPERATURE: 97.1 F | OXYGEN SATURATION: 98 %

## 2018-06-07 PROCEDURE — 3331090001 HH PPS REVENUE CREDIT

## 2018-06-07 PROCEDURE — G0299 HHS/HOSPICE OF RN EA 15 MIN: HCPCS

## 2018-06-07 PROCEDURE — 3331090002 HH PPS REVENUE DEBIT

## 2018-06-08 PROCEDURE — A4314 CATH W/DRAINAGE 2-WAY LATEX: HCPCS

## 2018-06-08 PROCEDURE — A4649 SURGICAL SUPPLIES: HCPCS

## 2018-06-08 PROCEDURE — A4320 IRRIGATION TRAY: HCPCS

## 2018-06-08 PROCEDURE — A4657 SYRINGE W/WO NEEDLE: HCPCS

## 2018-06-08 PROCEDURE — 3331090002 HH PPS REVENUE DEBIT

## 2018-06-08 PROCEDURE — 3331090001 HH PPS REVENUE CREDIT

## 2018-06-09 PROCEDURE — 3331090001 HH PPS REVENUE CREDIT

## 2018-06-09 PROCEDURE — 3331090002 HH PPS REVENUE DEBIT

## 2018-06-10 PROCEDURE — 3331090002 HH PPS REVENUE DEBIT

## 2018-06-10 PROCEDURE — 3331090001 HH PPS REVENUE CREDIT

## 2018-06-11 PROCEDURE — 3331090002 HH PPS REVENUE DEBIT

## 2018-06-11 PROCEDURE — 3331090001 HH PPS REVENUE CREDIT

## 2018-06-12 PROCEDURE — 3331090001 HH PPS REVENUE CREDIT

## 2018-06-12 PROCEDURE — 3331090002 HH PPS REVENUE DEBIT

## 2018-06-13 PROCEDURE — 3331090001 HH PPS REVENUE CREDIT

## 2018-06-13 PROCEDURE — 3331090002 HH PPS REVENUE DEBIT

## 2018-06-14 PROCEDURE — 3331090001 HH PPS REVENUE CREDIT

## 2018-06-14 PROCEDURE — 3331090002 HH PPS REVENUE DEBIT

## 2018-06-15 PROCEDURE — 3331090001 HH PPS REVENUE CREDIT

## 2018-06-15 PROCEDURE — 3331090002 HH PPS REVENUE DEBIT

## 2018-06-16 PROCEDURE — 3331090001 HH PPS REVENUE CREDIT

## 2018-06-16 PROCEDURE — 3331090002 HH PPS REVENUE DEBIT

## 2018-06-17 PROCEDURE — 3331090001 HH PPS REVENUE CREDIT

## 2018-06-17 PROCEDURE — 3331090002 HH PPS REVENUE DEBIT

## 2018-06-18 PROCEDURE — 3331090002 HH PPS REVENUE DEBIT

## 2018-06-18 PROCEDURE — 3331090001 HH PPS REVENUE CREDIT

## 2018-06-19 PROCEDURE — 3331090002 HH PPS REVENUE DEBIT

## 2018-06-19 PROCEDURE — 3331090001 HH PPS REVENUE CREDIT

## 2018-06-20 PROCEDURE — 3331090001 HH PPS REVENUE CREDIT

## 2018-06-20 PROCEDURE — 3331090002 HH PPS REVENUE DEBIT

## 2018-06-21 PROCEDURE — 3331090001 HH PPS REVENUE CREDIT

## 2018-06-21 PROCEDURE — 3331090002 HH PPS REVENUE DEBIT

## 2018-06-22 PROCEDURE — 3331090002 HH PPS REVENUE DEBIT

## 2018-06-22 PROCEDURE — 3331090001 HH PPS REVENUE CREDIT

## 2018-06-23 PROCEDURE — 3331090002 HH PPS REVENUE DEBIT

## 2018-06-23 PROCEDURE — 3331090001 HH PPS REVENUE CREDIT

## 2018-06-24 PROCEDURE — 3331090001 HH PPS REVENUE CREDIT

## 2018-06-24 PROCEDURE — 3331090002 HH PPS REVENUE DEBIT

## 2018-06-25 PROCEDURE — 3331090002 HH PPS REVENUE DEBIT

## 2018-06-25 PROCEDURE — 3331090001 HH PPS REVENUE CREDIT

## 2018-06-26 PROCEDURE — 3331090001 HH PPS REVENUE CREDIT

## 2018-06-26 PROCEDURE — 3331090002 HH PPS REVENUE DEBIT

## 2018-06-27 ENCOUNTER — HOME CARE VISIT (OUTPATIENT)
Dept: HOME HEALTH SERVICES | Facility: HOME HEALTH | Age: 83
End: 2018-06-27
Payer: MEDICARE

## 2018-06-27 PROCEDURE — 3331090001 HH PPS REVENUE CREDIT

## 2018-06-27 PROCEDURE — 3331090002 HH PPS REVENUE DEBIT

## 2018-06-28 PROCEDURE — 3331090001 HH PPS REVENUE CREDIT

## 2018-06-28 PROCEDURE — 3331090002 HH PPS REVENUE DEBIT

## 2018-06-29 PROCEDURE — 3331090001 HH PPS REVENUE CREDIT

## 2018-06-29 PROCEDURE — 3331090002 HH PPS REVENUE DEBIT

## 2018-06-30 PROCEDURE — 3331090002 HH PPS REVENUE DEBIT

## 2018-06-30 PROCEDURE — 3331090001 HH PPS REVENUE CREDIT

## 2018-07-01 PROCEDURE — 3331090001 HH PPS REVENUE CREDIT

## 2018-07-01 PROCEDURE — 3331090002 HH PPS REVENUE DEBIT

## 2018-07-02 PROCEDURE — 3331090001 HH PPS REVENUE CREDIT

## 2018-07-02 PROCEDURE — 3331090002 HH PPS REVENUE DEBIT

## 2018-07-03 ENCOUNTER — HOME CARE VISIT (OUTPATIENT)
Dept: SCHEDULING | Facility: HOME HEALTH | Age: 83
End: 2018-07-03
Payer: MEDICARE

## 2018-07-03 VITALS
OXYGEN SATURATION: 98 % | HEART RATE: 65 BPM | TEMPERATURE: 98 F | SYSTOLIC BLOOD PRESSURE: 145 MMHG | RESPIRATION RATE: 18 BRPM | DIASTOLIC BLOOD PRESSURE: 78 MMHG

## 2018-07-03 PROCEDURE — G0299 HHS/HOSPICE OF RN EA 15 MIN: HCPCS

## 2018-07-03 PROCEDURE — 3331090001 HH PPS REVENUE CREDIT

## 2018-07-03 PROCEDURE — 3331090003 HH PPS REVENUE ADJ

## 2018-07-03 PROCEDURE — 3331090002 HH PPS REVENUE DEBIT

## 2018-07-04 PROCEDURE — 3331090002 HH PPS REVENUE DEBIT

## 2018-07-04 PROCEDURE — 3331090001 HH PPS REVENUE CREDIT

## 2018-07-04 PROCEDURE — 400014 HH F/U

## 2018-07-05 PROCEDURE — A4338 INDWELLING CATHETER LATEX: HCPCS

## 2018-07-05 PROCEDURE — 3331090001 HH PPS REVENUE CREDIT

## 2018-07-05 PROCEDURE — 3331090002 HH PPS REVENUE DEBIT

## 2018-07-05 PROCEDURE — A4333 URINARY CATH ANCHOR DEVICE: HCPCS

## 2018-07-06 PROCEDURE — 3331090002 HH PPS REVENUE DEBIT

## 2018-07-06 PROCEDURE — 3331090001 HH PPS REVENUE CREDIT

## 2018-07-07 PROCEDURE — 3331090001 HH PPS REVENUE CREDIT

## 2018-07-07 PROCEDURE — 3331090002 HH PPS REVENUE DEBIT

## 2018-07-08 PROCEDURE — 3331090001 HH PPS REVENUE CREDIT

## 2018-07-08 PROCEDURE — 3331090002 HH PPS REVENUE DEBIT

## 2018-07-09 PROCEDURE — 3331090002 HH PPS REVENUE DEBIT

## 2018-07-09 PROCEDURE — 3331090001 HH PPS REVENUE CREDIT

## 2018-07-10 PROCEDURE — 3331090001 HH PPS REVENUE CREDIT

## 2018-07-10 PROCEDURE — 3331090002 HH PPS REVENUE DEBIT

## 2018-07-11 PROCEDURE — 3331090002 HH PPS REVENUE DEBIT

## 2018-07-11 PROCEDURE — 3331090001 HH PPS REVENUE CREDIT

## 2018-07-12 PROCEDURE — 3331090002 HH PPS REVENUE DEBIT

## 2018-07-12 PROCEDURE — 3331090001 HH PPS REVENUE CREDIT

## 2018-07-13 PROCEDURE — 3331090001 HH PPS REVENUE CREDIT

## 2018-07-13 PROCEDURE — 3331090002 HH PPS REVENUE DEBIT

## 2018-07-14 PROCEDURE — 3331090001 HH PPS REVENUE CREDIT

## 2018-07-14 PROCEDURE — 3331090002 HH PPS REVENUE DEBIT

## 2018-07-15 PROCEDURE — 3331090001 HH PPS REVENUE CREDIT

## 2018-07-15 PROCEDURE — 3331090002 HH PPS REVENUE DEBIT

## 2018-07-16 PROCEDURE — 3331090002 HH PPS REVENUE DEBIT

## 2018-07-16 PROCEDURE — 3331090001 HH PPS REVENUE CREDIT

## 2018-07-17 PROCEDURE — 3331090001 HH PPS REVENUE CREDIT

## 2018-07-17 PROCEDURE — 3331090002 HH PPS REVENUE DEBIT

## 2018-07-18 PROCEDURE — 3331090001 HH PPS REVENUE CREDIT

## 2018-07-18 PROCEDURE — 3331090002 HH PPS REVENUE DEBIT

## 2018-07-19 PROCEDURE — 3331090001 HH PPS REVENUE CREDIT

## 2018-07-19 PROCEDURE — 3331090002 HH PPS REVENUE DEBIT

## 2018-07-20 PROCEDURE — 3331090001 HH PPS REVENUE CREDIT

## 2018-07-20 PROCEDURE — 3331090002 HH PPS REVENUE DEBIT

## 2018-07-21 PROCEDURE — 3331090002 HH PPS REVENUE DEBIT

## 2018-07-21 PROCEDURE — 3331090001 HH PPS REVENUE CREDIT

## 2018-07-22 PROCEDURE — 3331090002 HH PPS REVENUE DEBIT

## 2018-07-22 PROCEDURE — 3331090001 HH PPS REVENUE CREDIT

## 2018-07-23 PROCEDURE — 3331090001 HH PPS REVENUE CREDIT

## 2018-07-23 PROCEDURE — 3331090002 HH PPS REVENUE DEBIT

## 2018-07-24 PROCEDURE — 3331090002 HH PPS REVENUE DEBIT

## 2018-07-24 PROCEDURE — 3331090001 HH PPS REVENUE CREDIT

## 2018-07-25 PROCEDURE — 3331090001 HH PPS REVENUE CREDIT

## 2018-07-25 PROCEDURE — 3331090002 HH PPS REVENUE DEBIT

## 2018-07-26 PROCEDURE — 3331090001 HH PPS REVENUE CREDIT

## 2018-07-26 PROCEDURE — 3331090002 HH PPS REVENUE DEBIT

## 2018-07-27 ENCOUNTER — HOME CARE VISIT (OUTPATIENT)
Dept: SCHEDULING | Facility: HOME HEALTH | Age: 83
End: 2018-07-27
Payer: MEDICARE

## 2018-07-27 VITALS
TEMPERATURE: 97.4 F | OXYGEN SATURATION: 98 % | SYSTOLIC BLOOD PRESSURE: 120 MMHG | HEART RATE: 59 BPM | RESPIRATION RATE: 19 BRPM | DIASTOLIC BLOOD PRESSURE: 62 MMHG

## 2018-07-27 PROCEDURE — 400014 HH F/U

## 2018-07-27 PROCEDURE — G0299 HHS/HOSPICE OF RN EA 15 MIN: HCPCS

## 2018-07-27 PROCEDURE — 3331090002 HH PPS REVENUE DEBIT

## 2018-07-27 PROCEDURE — 3331090001 HH PPS REVENUE CREDIT

## 2018-07-28 PROCEDURE — 3331090001 HH PPS REVENUE CREDIT

## 2018-07-28 PROCEDURE — 3331090002 HH PPS REVENUE DEBIT

## 2018-07-29 PROCEDURE — 3331090002 HH PPS REVENUE DEBIT

## 2018-07-29 PROCEDURE — 3331090001 HH PPS REVENUE CREDIT

## 2018-07-30 PROCEDURE — 3331090001 HH PPS REVENUE CREDIT

## 2018-07-30 PROCEDURE — 3331090002 HH PPS REVENUE DEBIT

## 2018-07-31 PROCEDURE — 3331090002 HH PPS REVENUE DEBIT

## 2018-07-31 PROCEDURE — 3331090001 HH PPS REVENUE CREDIT

## 2018-08-01 PROCEDURE — 3331090002 HH PPS REVENUE DEBIT

## 2018-08-01 PROCEDURE — 3331090001 HH PPS REVENUE CREDIT

## 2018-08-02 PROCEDURE — 3331090001 HH PPS REVENUE CREDIT

## 2018-08-02 PROCEDURE — 3331090002 HH PPS REVENUE DEBIT

## 2018-08-03 PROCEDURE — 3331090002 HH PPS REVENUE DEBIT

## 2018-08-03 PROCEDURE — 3331090001 HH PPS REVENUE CREDIT

## 2018-08-04 PROCEDURE — 3331090001 HH PPS REVENUE CREDIT

## 2018-08-04 PROCEDURE — 3331090002 HH PPS REVENUE DEBIT

## 2018-08-05 PROCEDURE — 3331090002 HH PPS REVENUE DEBIT

## 2018-08-05 PROCEDURE — 3331090001 HH PPS REVENUE CREDIT

## 2018-08-06 PROCEDURE — 3331090002 HH PPS REVENUE DEBIT

## 2018-08-06 PROCEDURE — 3331090001 HH PPS REVENUE CREDIT

## 2018-08-07 PROCEDURE — 3331090002 HH PPS REVENUE DEBIT

## 2018-08-07 PROCEDURE — 3331090001 HH PPS REVENUE CREDIT

## 2018-08-08 PROCEDURE — 3331090001 HH PPS REVENUE CREDIT

## 2018-08-08 PROCEDURE — 3331090002 HH PPS REVENUE DEBIT

## 2018-08-09 PROCEDURE — 3331090002 HH PPS REVENUE DEBIT

## 2018-08-09 PROCEDURE — 3331090001 HH PPS REVENUE CREDIT

## 2018-08-10 PROCEDURE — 3331090001 HH PPS REVENUE CREDIT

## 2018-08-10 PROCEDURE — 3331090002 HH PPS REVENUE DEBIT

## 2018-08-11 PROCEDURE — 3331090002 HH PPS REVENUE DEBIT

## 2018-08-11 PROCEDURE — 3331090001 HH PPS REVENUE CREDIT

## 2018-08-12 PROCEDURE — 3331090002 HH PPS REVENUE DEBIT

## 2018-08-12 PROCEDURE — 3331090001 HH PPS REVENUE CREDIT

## 2018-08-13 PROCEDURE — 3331090001 HH PPS REVENUE CREDIT

## 2018-08-13 PROCEDURE — 3331090002 HH PPS REVENUE DEBIT

## 2018-08-14 PROCEDURE — 3331090002 HH PPS REVENUE DEBIT

## 2018-08-14 PROCEDURE — 3331090001 HH PPS REVENUE CREDIT

## 2018-08-15 PROCEDURE — 3331090002 HH PPS REVENUE DEBIT

## 2018-08-15 PROCEDURE — 3331090001 HH PPS REVENUE CREDIT

## 2018-08-16 PROCEDURE — 3331090001 HH PPS REVENUE CREDIT

## 2018-08-16 PROCEDURE — 3331090002 HH PPS REVENUE DEBIT

## 2018-08-17 PROCEDURE — 3331090002 HH PPS REVENUE DEBIT

## 2018-08-17 PROCEDURE — 3331090001 HH PPS REVENUE CREDIT

## 2018-08-18 PROCEDURE — 3331090001 HH PPS REVENUE CREDIT

## 2018-08-18 PROCEDURE — 3331090002 HH PPS REVENUE DEBIT

## 2018-08-19 PROCEDURE — 3331090001 HH PPS REVENUE CREDIT

## 2018-08-19 PROCEDURE — 3331090002 HH PPS REVENUE DEBIT

## 2018-08-20 PROCEDURE — 3331090001 HH PPS REVENUE CREDIT

## 2018-08-20 PROCEDURE — 3331090002 HH PPS REVENUE DEBIT

## 2018-08-21 PROCEDURE — 3331090002 HH PPS REVENUE DEBIT

## 2018-08-21 PROCEDURE — 3331090001 HH PPS REVENUE CREDIT

## 2018-08-22 PROCEDURE — 3331090002 HH PPS REVENUE DEBIT

## 2018-08-22 PROCEDURE — 3331090001 HH PPS REVENUE CREDIT

## 2018-08-23 PROCEDURE — 3331090001 HH PPS REVENUE CREDIT

## 2018-08-23 PROCEDURE — 3331090002 HH PPS REVENUE DEBIT

## 2018-08-24 ENCOUNTER — HOME CARE VISIT (OUTPATIENT)
Dept: HOME HEALTH SERVICES | Facility: HOME HEALTH | Age: 83
End: 2018-08-24
Payer: MEDICARE

## 2018-08-24 VITALS
DIASTOLIC BLOOD PRESSURE: 77 MMHG | OXYGEN SATURATION: 98 % | SYSTOLIC BLOOD PRESSURE: 149 MMHG | RESPIRATION RATE: 16 BRPM | HEART RATE: 77 BPM | TEMPERATURE: 98.5 F

## 2018-08-24 PROCEDURE — A4315 CATH W/DRAINAGE 2-WAY SILCNE: HCPCS

## 2018-08-24 PROCEDURE — 3331090001 HH PPS REVENUE CREDIT

## 2018-08-24 PROCEDURE — 3331090002 HH PPS REVENUE DEBIT

## 2018-08-24 PROCEDURE — A4333 URINARY CATH ANCHOR DEVICE: HCPCS

## 2018-08-24 PROCEDURE — G0299 HHS/HOSPICE OF RN EA 15 MIN: HCPCS

## 2018-08-25 PROCEDURE — 3331090002 HH PPS REVENUE DEBIT

## 2018-08-25 PROCEDURE — 3331090001 HH PPS REVENUE CREDIT

## 2018-08-26 PROCEDURE — 3331090001 HH PPS REVENUE CREDIT

## 2018-08-26 PROCEDURE — 3331090002 HH PPS REVENUE DEBIT

## 2018-08-27 PROCEDURE — 3331090001 HH PPS REVENUE CREDIT

## 2018-08-27 PROCEDURE — 3331090002 HH PPS REVENUE DEBIT

## 2018-08-28 PROCEDURE — 3331090001 HH PPS REVENUE CREDIT

## 2018-08-28 PROCEDURE — 3331090002 HH PPS REVENUE DEBIT

## 2018-08-29 PROCEDURE — 3331090002 HH PPS REVENUE DEBIT

## 2018-08-29 PROCEDURE — 3331090001 HH PPS REVENUE CREDIT

## 2018-08-30 PROCEDURE — 3331090001 HH PPS REVENUE CREDIT

## 2018-08-30 PROCEDURE — 3331090002 HH PPS REVENUE DEBIT

## 2018-08-31 ENCOUNTER — HOME CARE VISIT (OUTPATIENT)
Dept: HOME HEALTH SERVICES | Facility: HOME HEALTH | Age: 83
End: 2018-08-31
Payer: MEDICARE

## 2018-08-31 VITALS
TEMPERATURE: 97.6 F | OXYGEN SATURATION: 98 % | SYSTOLIC BLOOD PRESSURE: 126 MMHG | HEART RATE: 70 BPM | DIASTOLIC BLOOD PRESSURE: 68 MMHG

## 2018-08-31 PROCEDURE — G0299 HHS/HOSPICE OF RN EA 15 MIN: HCPCS

## 2018-08-31 PROCEDURE — 3331090001 HH PPS REVENUE CREDIT

## 2018-08-31 PROCEDURE — 3331090002 HH PPS REVENUE DEBIT

## 2018-09-01 PROCEDURE — 3331090002 HH PPS REVENUE DEBIT

## 2018-09-01 PROCEDURE — 3331090001 HH PPS REVENUE CREDIT

## 2018-09-01 PROCEDURE — 3331090003 HH PPS REVENUE ADJ

## 2018-09-02 PROCEDURE — 3331090001 HH PPS REVENUE CREDIT

## 2018-09-02 PROCEDURE — 3331090002 HH PPS REVENUE DEBIT

## 2018-09-02 PROCEDURE — 400014 HH F/U

## 2018-09-03 PROCEDURE — 3331090001 HH PPS REVENUE CREDIT

## 2018-09-03 PROCEDURE — 3331090002 HH PPS REVENUE DEBIT

## 2018-09-04 PROCEDURE — 3331090001 HH PPS REVENUE CREDIT

## 2018-09-04 PROCEDURE — 3331090002 HH PPS REVENUE DEBIT

## 2018-09-05 PROCEDURE — 3331090001 HH PPS REVENUE CREDIT

## 2018-09-05 PROCEDURE — 3331090002 HH PPS REVENUE DEBIT

## 2018-09-06 PROCEDURE — 3331090001 HH PPS REVENUE CREDIT

## 2018-09-06 PROCEDURE — 3331090002 HH PPS REVENUE DEBIT

## 2018-09-07 PROCEDURE — 3331090002 HH PPS REVENUE DEBIT

## 2018-09-07 PROCEDURE — 3331090001 HH PPS REVENUE CREDIT

## 2018-09-08 PROCEDURE — 3331090002 HH PPS REVENUE DEBIT

## 2018-09-08 PROCEDURE — 3331090001 HH PPS REVENUE CREDIT

## 2018-09-09 PROCEDURE — 3331090001 HH PPS REVENUE CREDIT

## 2018-09-09 PROCEDURE — 3331090002 HH PPS REVENUE DEBIT

## 2018-09-10 PROCEDURE — 3331090002 HH PPS REVENUE DEBIT

## 2018-09-10 PROCEDURE — 3331090001 HH PPS REVENUE CREDIT

## 2018-09-11 PROCEDURE — 3331090002 HH PPS REVENUE DEBIT

## 2018-09-11 PROCEDURE — 3331090001 HH PPS REVENUE CREDIT

## 2018-09-12 PROCEDURE — 3331090002 HH PPS REVENUE DEBIT

## 2018-09-12 PROCEDURE — 3331090001 HH PPS REVENUE CREDIT

## 2018-09-13 PROCEDURE — 3331090002 HH PPS REVENUE DEBIT

## 2018-09-13 PROCEDURE — 3331090001 HH PPS REVENUE CREDIT

## 2018-09-14 PROCEDURE — 3331090002 HH PPS REVENUE DEBIT

## 2018-09-14 PROCEDURE — 3331090001 HH PPS REVENUE CREDIT

## 2018-09-15 PROCEDURE — 3331090001 HH PPS REVENUE CREDIT

## 2018-09-15 PROCEDURE — 3331090002 HH PPS REVENUE DEBIT

## 2018-09-16 ENCOUNTER — HOME CARE VISIT (OUTPATIENT)
Dept: HOME HEALTH SERVICES | Facility: HOME HEALTH | Age: 83
End: 2018-09-16
Payer: MEDICARE

## 2018-09-16 PROCEDURE — 3331090002 HH PPS REVENUE DEBIT

## 2018-09-16 PROCEDURE — 3331090001 HH PPS REVENUE CREDIT

## 2018-09-17 PROCEDURE — 3331090001 HH PPS REVENUE CREDIT

## 2018-09-17 PROCEDURE — 3331090002 HH PPS REVENUE DEBIT

## 2018-09-18 PROCEDURE — 3331090002 HH PPS REVENUE DEBIT

## 2018-09-18 PROCEDURE — 3331090001 HH PPS REVENUE CREDIT

## 2018-09-19 PROCEDURE — 3331090001 HH PPS REVENUE CREDIT

## 2018-09-19 PROCEDURE — 3331090002 HH PPS REVENUE DEBIT

## 2018-09-20 PROCEDURE — 3331090002 HH PPS REVENUE DEBIT

## 2018-09-20 PROCEDURE — 3331090001 HH PPS REVENUE CREDIT

## 2018-09-21 ENCOUNTER — HOME CARE VISIT (OUTPATIENT)
Dept: SCHEDULING | Facility: HOME HEALTH | Age: 83
End: 2018-09-21
Payer: MEDICARE

## 2018-09-21 VITALS
SYSTOLIC BLOOD PRESSURE: 130 MMHG | HEART RATE: 62 BPM | OXYGEN SATURATION: 98 % | DIASTOLIC BLOOD PRESSURE: 68 MMHG | TEMPERATURE: 98.2 F | RESPIRATION RATE: 18 BRPM

## 2018-09-21 PROCEDURE — 3331090002 HH PPS REVENUE DEBIT

## 2018-09-21 PROCEDURE — 400014 HH F/U

## 2018-09-21 PROCEDURE — 3331090001 HH PPS REVENUE CREDIT

## 2018-09-21 PROCEDURE — G0299 HHS/HOSPICE OF RN EA 15 MIN: HCPCS

## 2018-09-22 PROCEDURE — 3331090001 HH PPS REVENUE CREDIT

## 2018-09-22 PROCEDURE — 3331090002 HH PPS REVENUE DEBIT

## 2018-09-23 PROCEDURE — 3331090002 HH PPS REVENUE DEBIT

## 2018-09-23 PROCEDURE — 3331090001 HH PPS REVENUE CREDIT

## 2018-09-24 PROCEDURE — 3331090001 HH PPS REVENUE CREDIT

## 2018-09-24 PROCEDURE — 3331090002 HH PPS REVENUE DEBIT

## 2018-09-25 PROCEDURE — 3331090002 HH PPS REVENUE DEBIT

## 2018-09-25 PROCEDURE — 3331090001 HH PPS REVENUE CREDIT

## 2018-09-26 PROCEDURE — 3331090002 HH PPS REVENUE DEBIT

## 2018-09-26 PROCEDURE — 3331090001 HH PPS REVENUE CREDIT

## 2018-09-27 PROCEDURE — 3331090001 HH PPS REVENUE CREDIT

## 2018-09-27 PROCEDURE — 3331090002 HH PPS REVENUE DEBIT

## 2018-09-28 PROCEDURE — 3331090002 HH PPS REVENUE DEBIT

## 2018-09-28 PROCEDURE — 3331090001 HH PPS REVENUE CREDIT

## 2018-09-29 PROCEDURE — 3331090001 HH PPS REVENUE CREDIT

## 2018-09-29 PROCEDURE — 3331090002 HH PPS REVENUE DEBIT

## 2018-09-30 PROCEDURE — 3331090001 HH PPS REVENUE CREDIT

## 2018-09-30 PROCEDURE — 3331090002 HH PPS REVENUE DEBIT

## 2018-10-01 PROCEDURE — 3331090002 HH PPS REVENUE DEBIT

## 2018-10-01 PROCEDURE — 3331090001 HH PPS REVENUE CREDIT

## 2018-10-02 PROCEDURE — 3331090001 HH PPS REVENUE CREDIT

## 2018-10-02 PROCEDURE — 3331090002 HH PPS REVENUE DEBIT

## 2018-10-03 PROCEDURE — 3331090001 HH PPS REVENUE CREDIT

## 2018-10-03 PROCEDURE — 3331090002 HH PPS REVENUE DEBIT

## 2018-10-04 PROCEDURE — 3331090001 HH PPS REVENUE CREDIT

## 2018-10-04 PROCEDURE — 3331090002 HH PPS REVENUE DEBIT

## 2018-10-05 PROCEDURE — 3331090002 HH PPS REVENUE DEBIT

## 2018-10-05 PROCEDURE — 3331090001 HH PPS REVENUE CREDIT

## 2018-10-06 PROCEDURE — 3331090002 HH PPS REVENUE DEBIT

## 2018-10-06 PROCEDURE — 3331090001 HH PPS REVENUE CREDIT

## 2018-10-07 PROCEDURE — 3331090002 HH PPS REVENUE DEBIT

## 2018-10-07 PROCEDURE — 3331090001 HH PPS REVENUE CREDIT

## 2018-10-08 PROCEDURE — 3331090002 HH PPS REVENUE DEBIT

## 2018-10-08 PROCEDURE — 3331090001 HH PPS REVENUE CREDIT

## 2018-10-09 PROCEDURE — 3331090002 HH PPS REVENUE DEBIT

## 2018-10-09 PROCEDURE — 3331090001 HH PPS REVENUE CREDIT

## 2018-10-10 PROCEDURE — 3331090002 HH PPS REVENUE DEBIT

## 2018-10-10 PROCEDURE — 3331090001 HH PPS REVENUE CREDIT

## 2018-10-11 PROCEDURE — 3331090001 HH PPS REVENUE CREDIT

## 2018-10-11 PROCEDURE — 3331090002 HH PPS REVENUE DEBIT

## 2018-10-12 PROCEDURE — 3331090002 HH PPS REVENUE DEBIT

## 2018-10-12 PROCEDURE — 3331090001 HH PPS REVENUE CREDIT

## 2018-10-13 PROCEDURE — 3331090001 HH PPS REVENUE CREDIT

## 2018-10-13 PROCEDURE — 3331090002 HH PPS REVENUE DEBIT

## 2018-10-14 PROCEDURE — 3331090002 HH PPS REVENUE DEBIT

## 2018-10-14 PROCEDURE — 3331090001 HH PPS REVENUE CREDIT

## 2018-10-15 ENCOUNTER — HOME CARE VISIT (OUTPATIENT)
Dept: SCHEDULING | Facility: HOME HEALTH | Age: 83
End: 2018-10-15
Payer: MEDICARE

## 2018-10-15 VITALS
TEMPERATURE: 98 F | HEART RATE: 72 BPM | OXYGEN SATURATION: 95 % | SYSTOLIC BLOOD PRESSURE: 126 MMHG | DIASTOLIC BLOOD PRESSURE: 68 MMHG

## 2018-10-15 PROCEDURE — 3331090001 HH PPS REVENUE CREDIT

## 2018-10-15 PROCEDURE — 3331090002 HH PPS REVENUE DEBIT

## 2018-10-15 PROCEDURE — G0299 HHS/HOSPICE OF RN EA 15 MIN: HCPCS

## 2018-10-16 PROCEDURE — 3331090001 HH PPS REVENUE CREDIT

## 2018-10-16 PROCEDURE — 3331090002 HH PPS REVENUE DEBIT

## 2018-10-17 PROCEDURE — 3331090002 HH PPS REVENUE DEBIT

## 2018-10-17 PROCEDURE — 3331090001 HH PPS REVENUE CREDIT

## 2018-10-18 PROCEDURE — 3331090001 HH PPS REVENUE CREDIT

## 2018-10-18 PROCEDURE — 3331090002 HH PPS REVENUE DEBIT

## 2018-10-19 ENCOUNTER — HOME CARE VISIT (OUTPATIENT)
Dept: SCHEDULING | Facility: HOME HEALTH | Age: 83
End: 2018-10-19
Payer: MEDICARE

## 2018-10-19 VITALS
SYSTOLIC BLOOD PRESSURE: 130 MMHG | HEART RATE: 74 BPM | OXYGEN SATURATION: 97 % | TEMPERATURE: 97.7 F | RESPIRATION RATE: 18 BRPM | DIASTOLIC BLOOD PRESSURE: 68 MMHG

## 2018-10-19 PROCEDURE — 3331090002 HH PPS REVENUE DEBIT

## 2018-10-19 PROCEDURE — A4927 NON-STERILE GLOVES: HCPCS

## 2018-10-19 PROCEDURE — A4315 CATH W/DRAINAGE 2-WAY SILCNE: HCPCS

## 2018-10-19 PROCEDURE — A4333 URINARY CATH ANCHOR DEVICE: HCPCS

## 2018-10-19 PROCEDURE — G0299 HHS/HOSPICE OF RN EA 15 MIN: HCPCS

## 2018-10-19 PROCEDURE — 3331090001 HH PPS REVENUE CREDIT

## 2018-10-20 PROCEDURE — 3331090002 HH PPS REVENUE DEBIT

## 2018-10-20 PROCEDURE — 3331090001 HH PPS REVENUE CREDIT

## 2018-10-21 PROCEDURE — 3331090002 HH PPS REVENUE DEBIT

## 2018-10-21 PROCEDURE — 3331090001 HH PPS REVENUE CREDIT

## 2018-10-22 PROCEDURE — 3331090002 HH PPS REVENUE DEBIT

## 2018-10-22 PROCEDURE — 3331090001 HH PPS REVENUE CREDIT

## 2018-10-23 PROCEDURE — 3331090001 HH PPS REVENUE CREDIT

## 2018-10-23 PROCEDURE — 3331090002 HH PPS REVENUE DEBIT

## 2018-10-24 ENCOUNTER — HOME CARE VISIT (OUTPATIENT)
Dept: SCHEDULING | Facility: HOME HEALTH | Age: 83
End: 2018-10-24
Payer: MEDICARE

## 2018-10-24 PROCEDURE — 3331090001 HH PPS REVENUE CREDIT

## 2018-10-24 PROCEDURE — 3331090002 HH PPS REVENUE DEBIT

## 2018-10-24 PROCEDURE — G0151 HHCP-SERV OF PT,EA 15 MIN: HCPCS

## 2018-10-25 VITALS
HEART RATE: 76 BPM | DIASTOLIC BLOOD PRESSURE: 76 MMHG | OXYGEN SATURATION: 98 % | SYSTOLIC BLOOD PRESSURE: 124 MMHG | RESPIRATION RATE: 17 BRPM

## 2018-10-25 PROCEDURE — 3331090002 HH PPS REVENUE DEBIT

## 2018-10-25 PROCEDURE — 3331090001 HH PPS REVENUE CREDIT

## 2018-10-26 PROCEDURE — 3331090002 HH PPS REVENUE DEBIT

## 2018-10-26 PROCEDURE — 3331090001 HH PPS REVENUE CREDIT

## 2018-10-27 PROCEDURE — 3331090001 HH PPS REVENUE CREDIT

## 2018-10-27 PROCEDURE — 3331090002 HH PPS REVENUE DEBIT

## 2018-10-28 PROCEDURE — 3331090002 HH PPS REVENUE DEBIT

## 2018-10-28 PROCEDURE — 3331090001 HH PPS REVENUE CREDIT

## 2018-10-29 ENCOUNTER — HOME CARE VISIT (OUTPATIENT)
Dept: SCHEDULING | Facility: HOME HEALTH | Age: 83
End: 2018-10-29
Payer: MEDICARE

## 2018-10-29 VITALS
HEART RATE: 68 BPM | SYSTOLIC BLOOD PRESSURE: 140 MMHG | OXYGEN SATURATION: 98 % | TEMPERATURE: 98.1 F | RESPIRATION RATE: 18 BRPM | DIASTOLIC BLOOD PRESSURE: 70 MMHG

## 2018-10-29 PROCEDURE — 3331090002 HH PPS REVENUE DEBIT

## 2018-10-29 PROCEDURE — 3331090001 HH PPS REVENUE CREDIT

## 2018-10-29 PROCEDURE — G0299 HHS/HOSPICE OF RN EA 15 MIN: HCPCS

## 2018-10-30 PROCEDURE — 3331090001 HH PPS REVENUE CREDIT

## 2018-10-30 PROCEDURE — 3331090002 HH PPS REVENUE DEBIT

## 2018-10-31 ENCOUNTER — HOSPITAL ENCOUNTER (OUTPATIENT)
Dept: LAB | Age: 83
Discharge: HOME | End: 2018-10-31
Attending: FAMILY MEDICINE
Payer: MEDICARE

## 2018-10-31 ENCOUNTER — HOME CARE VISIT (OUTPATIENT)
Dept: HOME HEALTH SERVICES | Facility: HOME HEALTH | Age: 83
End: 2018-10-31
Payer: MEDICARE

## 2018-10-31 VITALS
RESPIRATION RATE: 18 BRPM | TEMPERATURE: 98.3 F | SYSTOLIC BLOOD PRESSURE: 112 MMHG | DIASTOLIC BLOOD PRESSURE: 68 MMHG | HEART RATE: 60 BPM | OXYGEN SATURATION: 98 %

## 2018-10-31 PROCEDURE — G0299 HHS/HOSPICE OF RN EA 15 MIN: HCPCS

## 2018-10-31 PROCEDURE — 3331090002 HH PPS REVENUE DEBIT

## 2018-10-31 PROCEDURE — 87088 URINE BACTERIA CULTURE: CPT

## 2018-10-31 PROCEDURE — 87186 SC STD MICRODIL/AGAR DIL: CPT

## 2018-10-31 PROCEDURE — 87086 URINE CULTURE/COLONY COUNT: CPT

## 2018-10-31 PROCEDURE — A4657 SYRINGE W/WO NEEDLE: HCPCS

## 2018-10-31 PROCEDURE — 3331090001 HH PPS REVENUE CREDIT

## 2018-11-01 PROCEDURE — 3331090002 HH PPS REVENUE DEBIT

## 2018-11-01 PROCEDURE — 3331090001 HH PPS REVENUE CREDIT

## 2018-11-01 PROCEDURE — 400014 HH F/U

## 2018-11-02 PROCEDURE — 3331090001 HH PPS REVENUE CREDIT

## 2018-11-02 PROCEDURE — 3331090002 HH PPS REVENUE DEBIT

## 2018-11-03 PROCEDURE — 3331090002 HH PPS REVENUE DEBIT

## 2018-11-03 PROCEDURE — 3331090001 HH PPS REVENUE CREDIT

## 2018-11-04 LAB
BACTERIA SPEC CULT: ABNORMAL
BACTERIA SPEC CULT: ABNORMAL
SERVICE CMNT-IMP: ABNORMAL

## 2018-11-04 PROCEDURE — 3331090001 HH PPS REVENUE CREDIT

## 2018-11-04 PROCEDURE — 3331090002 HH PPS REVENUE DEBIT

## 2018-11-05 PROCEDURE — 3331090002 HH PPS REVENUE DEBIT

## 2018-11-05 PROCEDURE — 3331090001 HH PPS REVENUE CREDIT

## 2018-11-06 PROCEDURE — 3331090002 HH PPS REVENUE DEBIT

## 2018-11-06 PROCEDURE — 3331090001 HH PPS REVENUE CREDIT

## 2018-11-07 PROCEDURE — 3331090002 HH PPS REVENUE DEBIT

## 2018-11-07 PROCEDURE — 3331090001 HH PPS REVENUE CREDIT

## 2018-11-08 PROCEDURE — 3331090002 HH PPS REVENUE DEBIT

## 2018-11-08 PROCEDURE — 3331090001 HH PPS REVENUE CREDIT

## 2018-11-09 PROCEDURE — 3331090002 HH PPS REVENUE DEBIT

## 2018-11-09 PROCEDURE — 3331090001 HH PPS REVENUE CREDIT

## 2018-11-10 PROCEDURE — 3331090002 HH PPS REVENUE DEBIT

## 2018-11-10 PROCEDURE — 3331090001 HH PPS REVENUE CREDIT

## 2018-11-11 PROCEDURE — 3331090002 HH PPS REVENUE DEBIT

## 2018-11-11 PROCEDURE — 3331090001 HH PPS REVENUE CREDIT

## 2018-11-12 PROCEDURE — 3331090001 HH PPS REVENUE CREDIT

## 2018-11-12 PROCEDURE — 3331090002 HH PPS REVENUE DEBIT

## 2018-11-13 PROCEDURE — 3331090001 HH PPS REVENUE CREDIT

## 2018-11-13 PROCEDURE — 3331090002 HH PPS REVENUE DEBIT

## 2018-11-14 PROCEDURE — 3331090001 HH PPS REVENUE CREDIT

## 2018-11-14 PROCEDURE — 3331090002 HH PPS REVENUE DEBIT

## 2018-11-15 PROCEDURE — 3331090002 HH PPS REVENUE DEBIT

## 2018-11-15 PROCEDURE — 3331090001 HH PPS REVENUE CREDIT

## 2018-11-16 ENCOUNTER — HOME CARE VISIT (OUTPATIENT)
Dept: SCHEDULING | Facility: HOME HEALTH | Age: 83
End: 2018-11-16
Payer: MEDICARE

## 2018-11-16 VITALS
HEART RATE: 67 BPM | RESPIRATION RATE: 18 BRPM | TEMPERATURE: 97.8 F | OXYGEN SATURATION: 98 % | DIASTOLIC BLOOD PRESSURE: 64 MMHG | SYSTOLIC BLOOD PRESSURE: 126 MMHG

## 2018-11-16 PROCEDURE — G0299 HHS/HOSPICE OF RN EA 15 MIN: HCPCS

## 2018-11-16 PROCEDURE — 3331090001 HH PPS REVENUE CREDIT

## 2018-11-16 PROCEDURE — 400014 HH F/U

## 2018-11-16 PROCEDURE — 3331090002 HH PPS REVENUE DEBIT

## 2018-11-17 PROCEDURE — 3331090002 HH PPS REVENUE DEBIT

## 2018-11-17 PROCEDURE — 3331090001 HH PPS REVENUE CREDIT

## 2018-11-18 PROCEDURE — 3331090001 HH PPS REVENUE CREDIT

## 2018-11-18 PROCEDURE — 3331090002 HH PPS REVENUE DEBIT

## 2018-11-19 PROCEDURE — 3331090001 HH PPS REVENUE CREDIT

## 2018-11-19 PROCEDURE — 3331090002 HH PPS REVENUE DEBIT

## 2018-11-20 PROCEDURE — 3331090001 HH PPS REVENUE CREDIT

## 2018-11-20 PROCEDURE — 3331090002 HH PPS REVENUE DEBIT

## 2018-11-21 PROCEDURE — 3331090002 HH PPS REVENUE DEBIT

## 2018-11-21 PROCEDURE — 3331090001 HH PPS REVENUE CREDIT

## 2018-11-22 PROCEDURE — 3331090001 HH PPS REVENUE CREDIT

## 2018-11-22 PROCEDURE — 3331090002 HH PPS REVENUE DEBIT

## 2018-11-22 PROCEDURE — A4315 CATH W/DRAINAGE 2-WAY SILCNE: HCPCS

## 2018-11-22 PROCEDURE — A4927 NON-STERILE GLOVES: HCPCS

## 2018-11-23 PROCEDURE — 3331090001 HH PPS REVENUE CREDIT

## 2018-11-23 PROCEDURE — 3331090002 HH PPS REVENUE DEBIT

## 2018-11-24 PROCEDURE — 3331090002 HH PPS REVENUE DEBIT

## 2018-11-24 PROCEDURE — 3331090001 HH PPS REVENUE CREDIT

## 2018-11-25 PROCEDURE — 3331090001 HH PPS REVENUE CREDIT

## 2018-11-25 PROCEDURE — 3331090002 HH PPS REVENUE DEBIT

## 2018-11-26 PROCEDURE — 3331090001 HH PPS REVENUE CREDIT

## 2018-11-26 PROCEDURE — 3331090002 HH PPS REVENUE DEBIT

## 2018-11-27 ENCOUNTER — HOME CARE VISIT (OUTPATIENT)
Dept: HOME HEALTH SERVICES | Facility: HOME HEALTH | Age: 83
End: 2018-11-27
Payer: MEDICARE

## 2018-11-27 PROCEDURE — 3331090002 HH PPS REVENUE DEBIT

## 2018-11-27 PROCEDURE — 3331090001 HH PPS REVENUE CREDIT

## 2018-11-27 PROCEDURE — G0299 HHS/HOSPICE OF RN EA 15 MIN: HCPCS

## 2018-11-28 PROCEDURE — 3331090001 HH PPS REVENUE CREDIT

## 2018-11-28 PROCEDURE — 3331090002 HH PPS REVENUE DEBIT

## 2018-11-29 PROCEDURE — 3331090001 HH PPS REVENUE CREDIT

## 2018-11-29 PROCEDURE — 3331090002 HH PPS REVENUE DEBIT

## 2018-11-30 PROCEDURE — 3331090002 HH PPS REVENUE DEBIT

## 2018-11-30 PROCEDURE — 3331090001 HH PPS REVENUE CREDIT

## 2018-12-01 PROCEDURE — 3331090001 HH PPS REVENUE CREDIT

## 2018-12-01 PROCEDURE — 3331090002 HH PPS REVENUE DEBIT

## 2018-12-02 PROCEDURE — 3331090001 HH PPS REVENUE CREDIT

## 2018-12-02 PROCEDURE — 3331090002 HH PPS REVENUE DEBIT

## 2018-12-03 ENCOUNTER — HOME CARE VISIT (OUTPATIENT)
Dept: HOME HEALTH SERVICES | Facility: HOME HEALTH | Age: 83
End: 2018-12-03
Payer: MEDICARE

## 2018-12-03 VITALS
RESPIRATION RATE: 16 BRPM | HEART RATE: 63 BPM | SYSTOLIC BLOOD PRESSURE: 132 MMHG | OXYGEN SATURATION: 98 % | TEMPERATURE: 97.9 F | DIASTOLIC BLOOD PRESSURE: 62 MMHG

## 2018-12-03 PROCEDURE — G0299 HHS/HOSPICE OF RN EA 15 MIN: HCPCS

## 2018-12-03 PROCEDURE — A4657 SYRINGE W/WO NEEDLE: HCPCS

## 2018-12-03 PROCEDURE — A4314 CATH W/DRAINAGE 2-WAY LATEX: HCPCS

## 2018-12-03 PROCEDURE — 3331090001 HH PPS REVENUE CREDIT

## 2018-12-03 PROCEDURE — 3331090002 HH PPS REVENUE DEBIT

## 2018-12-04 PROCEDURE — 3331090001 HH PPS REVENUE CREDIT

## 2018-12-04 PROCEDURE — 3331090002 HH PPS REVENUE DEBIT

## 2018-12-05 ENCOUNTER — HOME CARE VISIT (OUTPATIENT)
Dept: SCHEDULING | Facility: HOME HEALTH | Age: 83
End: 2018-12-05
Payer: MEDICARE

## 2018-12-05 VITALS
RESPIRATION RATE: 22 BRPM | TEMPERATURE: 97.3 F | HEART RATE: 68 BPM | OXYGEN SATURATION: 98 % | SYSTOLIC BLOOD PRESSURE: 120 MMHG | DIASTOLIC BLOOD PRESSURE: 68 MMHG

## 2018-12-05 PROCEDURE — G0299 HHS/HOSPICE OF RN EA 15 MIN: HCPCS

## 2018-12-05 PROCEDURE — 3331090002 HH PPS REVENUE DEBIT

## 2018-12-05 PROCEDURE — 3331090001 HH PPS REVENUE CREDIT

## 2018-12-06 PROCEDURE — 3331090002 HH PPS REVENUE DEBIT

## 2018-12-06 PROCEDURE — 3331090001 HH PPS REVENUE CREDIT

## 2018-12-07 PROCEDURE — 3331090001 HH PPS REVENUE CREDIT

## 2018-12-07 PROCEDURE — 3331090002 HH PPS REVENUE DEBIT

## 2018-12-08 PROCEDURE — 3331090001 HH PPS REVENUE CREDIT

## 2018-12-08 PROCEDURE — 3331090002 HH PPS REVENUE DEBIT

## 2018-12-09 PROCEDURE — 3331090001 HH PPS REVENUE CREDIT

## 2018-12-09 PROCEDURE — 3331090002 HH PPS REVENUE DEBIT

## 2018-12-10 PROCEDURE — 3331090001 HH PPS REVENUE CREDIT

## 2018-12-10 PROCEDURE — 3331090002 HH PPS REVENUE DEBIT

## 2018-12-11 PROCEDURE — 3331090001 HH PPS REVENUE CREDIT

## 2018-12-11 PROCEDURE — 3331090002 HH PPS REVENUE DEBIT

## 2018-12-12 ENCOUNTER — HOME CARE VISIT (OUTPATIENT)
Dept: SCHEDULING | Facility: HOME HEALTH | Age: 83
End: 2018-12-12
Payer: MEDICARE

## 2018-12-12 VITALS
HEART RATE: 63 BPM | TEMPERATURE: 97.9 F | RESPIRATION RATE: 18 BRPM | SYSTOLIC BLOOD PRESSURE: 112 MMHG | DIASTOLIC BLOOD PRESSURE: 60 MMHG | OXYGEN SATURATION: 98 %

## 2018-12-12 PROCEDURE — 3331090002 HH PPS REVENUE DEBIT

## 2018-12-12 PROCEDURE — G0299 HHS/HOSPICE OF RN EA 15 MIN: HCPCS

## 2018-12-12 PROCEDURE — 3331090001 HH PPS REVENUE CREDIT

## 2018-12-13 PROCEDURE — 3331090001 HH PPS REVENUE CREDIT

## 2018-12-13 PROCEDURE — 3331090002 HH PPS REVENUE DEBIT

## 2018-12-14 PROCEDURE — 3331090002 HH PPS REVENUE DEBIT

## 2018-12-14 PROCEDURE — 3331090001 HH PPS REVENUE CREDIT

## 2018-12-15 PROCEDURE — 3331090002 HH PPS REVENUE DEBIT

## 2018-12-15 PROCEDURE — 3331090001 HH PPS REVENUE CREDIT

## 2018-12-16 PROCEDURE — 3331090002 HH PPS REVENUE DEBIT

## 2018-12-16 PROCEDURE — 3331090001 HH PPS REVENUE CREDIT

## 2018-12-17 PROCEDURE — 3331090001 HH PPS REVENUE CREDIT

## 2018-12-17 PROCEDURE — 3331090002 HH PPS REVENUE DEBIT

## 2018-12-18 PROCEDURE — 3331090001 HH PPS REVENUE CREDIT

## 2018-12-18 PROCEDURE — 3331090002 HH PPS REVENUE DEBIT

## 2018-12-19 PROCEDURE — 3331090001 HH PPS REVENUE CREDIT

## 2018-12-19 PROCEDURE — 3331090002 HH PPS REVENUE DEBIT

## 2018-12-20 PROCEDURE — 3331090002 HH PPS REVENUE DEBIT

## 2018-12-20 PROCEDURE — 3331090001 HH PPS REVENUE CREDIT

## 2018-12-21 PROCEDURE — 3331090001 HH PPS REVENUE CREDIT

## 2018-12-21 PROCEDURE — 3331090002 HH PPS REVENUE DEBIT

## 2018-12-22 PROCEDURE — 3331090002 HH PPS REVENUE DEBIT

## 2018-12-22 PROCEDURE — 3331090001 HH PPS REVENUE CREDIT

## 2018-12-23 PROCEDURE — 3331090002 HH PPS REVENUE DEBIT

## 2018-12-23 PROCEDURE — 3331090001 HH PPS REVENUE CREDIT

## 2018-12-24 PROCEDURE — 3331090001 HH PPS REVENUE CREDIT

## 2018-12-24 PROCEDURE — 3331090002 HH PPS REVENUE DEBIT

## 2018-12-25 PROCEDURE — 3331090001 HH PPS REVENUE CREDIT

## 2018-12-25 PROCEDURE — 3331090002 HH PPS REVENUE DEBIT

## 2018-12-26 PROCEDURE — 3331090001 HH PPS REVENUE CREDIT

## 2018-12-26 PROCEDURE — 3331090002 HH PPS REVENUE DEBIT

## 2018-12-27 ENCOUNTER — HOME CARE VISIT (OUTPATIENT)
Dept: SCHEDULING | Facility: HOME HEALTH | Age: 83
End: 2018-12-27
Payer: MEDICARE

## 2018-12-27 VITALS
SYSTOLIC BLOOD PRESSURE: 122 MMHG | RESPIRATION RATE: 18 BRPM | HEART RATE: 62 BPM | OXYGEN SATURATION: 98 % | DIASTOLIC BLOOD PRESSURE: 70 MMHG | TEMPERATURE: 97.6 F

## 2018-12-27 PROCEDURE — G0299 HHS/HOSPICE OF RN EA 15 MIN: HCPCS

## 2018-12-27 PROCEDURE — 3331090001 HH PPS REVENUE CREDIT

## 2018-12-27 PROCEDURE — 3331090002 HH PPS REVENUE DEBIT

## 2018-12-28 PROCEDURE — 3331090002 HH PPS REVENUE DEBIT

## 2018-12-28 PROCEDURE — 3331090001 HH PPS REVENUE CREDIT

## 2018-12-29 PROCEDURE — 3331090002 HH PPS REVENUE DEBIT

## 2018-12-29 PROCEDURE — 3331090001 HH PPS REVENUE CREDIT

## 2018-12-30 PROCEDURE — 3331090001 HH PPS REVENUE CREDIT

## 2018-12-30 PROCEDURE — 3331090002 HH PPS REVENUE DEBIT

## 2018-12-31 PROCEDURE — 3331090001 HH PPS REVENUE CREDIT

## 2018-12-31 PROCEDURE — 3331090002 HH PPS REVENUE DEBIT

## 2019-01-01 ENCOUNTER — HOME CARE VISIT (OUTPATIENT)
Dept: SCHEDULING | Facility: HOME HEALTH | Age: 84
End: 2019-01-01
Payer: MEDICARE

## 2019-01-01 VITALS
SYSTOLIC BLOOD PRESSURE: 126 MMHG | DIASTOLIC BLOOD PRESSURE: 60 MMHG | OXYGEN SATURATION: 98 % | HEART RATE: 74 BPM | TEMPERATURE: 97.4 F | RESPIRATION RATE: 20 BRPM

## 2019-01-01 PROCEDURE — A4314 CATH W/DRAINAGE 2-WAY LATEX: HCPCS

## 2019-01-01 PROCEDURE — G0299 HHS/HOSPICE OF RN EA 15 MIN: HCPCS

## 2019-01-01 PROCEDURE — 3331090001 HH PPS REVENUE CREDIT

## 2019-01-01 PROCEDURE — A4657 SYRINGE W/WO NEEDLE: HCPCS

## 2019-01-01 PROCEDURE — 3331090002 HH PPS REVENUE DEBIT

## 2019-01-01 PROCEDURE — 400014 HH F/U

## 2019-01-02 PROCEDURE — 3331090002 HH PPS REVENUE DEBIT

## 2019-01-02 PROCEDURE — 3331090001 HH PPS REVENUE CREDIT

## 2019-01-03 PROCEDURE — 3331090002 HH PPS REVENUE DEBIT

## 2019-01-03 PROCEDURE — 3331090001 HH PPS REVENUE CREDIT

## 2019-01-04 PROCEDURE — 3331090002 HH PPS REVENUE DEBIT

## 2019-01-04 PROCEDURE — 3331090001 HH PPS REVENUE CREDIT

## 2019-01-05 PROCEDURE — 3331090002 HH PPS REVENUE DEBIT

## 2019-01-05 PROCEDURE — 3331090001 HH PPS REVENUE CREDIT

## 2019-01-06 PROCEDURE — 3331090001 HH PPS REVENUE CREDIT

## 2019-01-06 PROCEDURE — 3331090002 HH PPS REVENUE DEBIT

## 2019-01-07 PROCEDURE — 3331090001 HH PPS REVENUE CREDIT

## 2019-01-07 PROCEDURE — 3331090002 HH PPS REVENUE DEBIT

## 2019-01-08 PROCEDURE — 3331090002 HH PPS REVENUE DEBIT

## 2019-01-08 PROCEDURE — 3331090001 HH PPS REVENUE CREDIT

## 2019-01-09 PROCEDURE — 3331090002 HH PPS REVENUE DEBIT

## 2019-01-09 PROCEDURE — 3331090001 HH PPS REVENUE CREDIT

## 2019-01-10 PROCEDURE — 3331090002 HH PPS REVENUE DEBIT

## 2019-01-10 PROCEDURE — 3331090001 HH PPS REVENUE CREDIT

## 2019-01-11 PROCEDURE — 3331090001 HH PPS REVENUE CREDIT

## 2019-01-11 PROCEDURE — 3331090002 HH PPS REVENUE DEBIT

## 2019-01-12 PROCEDURE — 3331090002 HH PPS REVENUE DEBIT

## 2019-01-12 PROCEDURE — 3331090001 HH PPS REVENUE CREDIT

## 2019-01-13 PROCEDURE — 3331090002 HH PPS REVENUE DEBIT

## 2019-01-13 PROCEDURE — 3331090001 HH PPS REVENUE CREDIT

## 2019-01-14 PROCEDURE — 3331090002 HH PPS REVENUE DEBIT

## 2019-01-14 PROCEDURE — 3331090001 HH PPS REVENUE CREDIT

## 2019-01-15 PROCEDURE — 3331090001 HH PPS REVENUE CREDIT

## 2019-01-15 PROCEDURE — 3331090002 HH PPS REVENUE DEBIT

## 2019-01-16 PROCEDURE — 3331090001 HH PPS REVENUE CREDIT

## 2019-01-16 PROCEDURE — 3331090002 HH PPS REVENUE DEBIT

## 2019-01-17 PROCEDURE — 3331090002 HH PPS REVENUE DEBIT

## 2019-01-17 PROCEDURE — 3331090001 HH PPS REVENUE CREDIT

## 2019-01-18 PROCEDURE — 3331090002 HH PPS REVENUE DEBIT

## 2019-01-18 PROCEDURE — 3331090001 HH PPS REVENUE CREDIT

## 2019-01-19 PROCEDURE — 3331090002 HH PPS REVENUE DEBIT

## 2019-01-19 PROCEDURE — 3331090001 HH PPS REVENUE CREDIT

## 2019-01-20 PROCEDURE — 3331090001 HH PPS REVENUE CREDIT

## 2019-01-20 PROCEDURE — 3331090002 HH PPS REVENUE DEBIT

## 2019-01-21 PROCEDURE — 3331090001 HH PPS REVENUE CREDIT

## 2019-01-21 PROCEDURE — 3331090002 HH PPS REVENUE DEBIT

## 2019-01-22 PROCEDURE — 3331090001 HH PPS REVENUE CREDIT

## 2019-01-22 PROCEDURE — 3331090002 HH PPS REVENUE DEBIT

## 2019-01-23 PROCEDURE — 3331090001 HH PPS REVENUE CREDIT

## 2019-01-23 PROCEDURE — 3331090002 HH PPS REVENUE DEBIT

## 2019-01-24 PROCEDURE — 3331090002 HH PPS REVENUE DEBIT

## 2019-01-24 PROCEDURE — 3331090001 HH PPS REVENUE CREDIT

## 2019-01-25 PROCEDURE — 3331090001 HH PPS REVENUE CREDIT

## 2019-01-25 PROCEDURE — 3331090002 HH PPS REVENUE DEBIT

## 2019-01-26 PROCEDURE — 3331090002 HH PPS REVENUE DEBIT

## 2019-01-26 PROCEDURE — 3331090001 HH PPS REVENUE CREDIT

## 2019-01-27 PROCEDURE — 3331090002 HH PPS REVENUE DEBIT

## 2019-01-27 PROCEDURE — 3331090001 HH PPS REVENUE CREDIT

## 2019-01-28 PROCEDURE — 3331090001 HH PPS REVENUE CREDIT

## 2019-01-28 PROCEDURE — 3331090002 HH PPS REVENUE DEBIT

## 2019-01-29 ENCOUNTER — HOME CARE VISIT (OUTPATIENT)
Dept: SCHEDULING | Facility: HOME HEALTH | Age: 84
End: 2019-01-29
Payer: MEDICARE

## 2019-01-29 VITALS
DIASTOLIC BLOOD PRESSURE: 62 MMHG | RESPIRATION RATE: 16 BRPM | OXYGEN SATURATION: 96 % | HEART RATE: 68 BPM | TEMPERATURE: 97.8 F | SYSTOLIC BLOOD PRESSURE: 119 MMHG

## 2019-01-29 PROCEDURE — 3331090001 HH PPS REVENUE CREDIT

## 2019-01-29 PROCEDURE — G0299 HHS/HOSPICE OF RN EA 15 MIN: HCPCS

## 2019-01-29 PROCEDURE — 3331090002 HH PPS REVENUE DEBIT

## 2019-01-29 PROCEDURE — A4657 SYRINGE W/WO NEEDLE: HCPCS

## 2019-01-29 PROCEDURE — A4314 CATH W/DRAINAGE 2-WAY LATEX: HCPCS

## 2019-01-30 PROCEDURE — 3331090001 HH PPS REVENUE CREDIT

## 2019-01-30 PROCEDURE — 3331090002 HH PPS REVENUE DEBIT

## 2019-01-31 PROCEDURE — 3331090001 HH PPS REVENUE CREDIT

## 2019-01-31 PROCEDURE — 3331090002 HH PPS REVENUE DEBIT

## 2019-02-01 PROCEDURE — 3331090001 HH PPS REVENUE CREDIT

## 2019-02-01 PROCEDURE — 3331090002 HH PPS REVENUE DEBIT

## 2019-02-02 PROCEDURE — 3331090002 HH PPS REVENUE DEBIT

## 2019-02-02 PROCEDURE — 3331090001 HH PPS REVENUE CREDIT

## 2019-02-03 PROCEDURE — 3331090001 HH PPS REVENUE CREDIT

## 2019-02-03 PROCEDURE — 3331090002 HH PPS REVENUE DEBIT

## 2019-02-04 PROCEDURE — 3331090002 HH PPS REVENUE DEBIT

## 2019-02-04 PROCEDURE — 3331090001 HH PPS REVENUE CREDIT

## 2019-02-05 PROCEDURE — A4657 SYRINGE W/WO NEEDLE: HCPCS

## 2019-02-05 PROCEDURE — A4334 URINARY CATH LEG STRAP: HCPCS

## 2019-02-05 PROCEDURE — 3331090002 HH PPS REVENUE DEBIT

## 2019-02-05 PROCEDURE — A4314 CATH W/DRAINAGE 2-WAY LATEX: HCPCS

## 2019-02-05 PROCEDURE — 3331090001 HH PPS REVENUE CREDIT

## 2019-02-06 PROCEDURE — 3331090002 HH PPS REVENUE DEBIT

## 2019-02-06 PROCEDURE — 3331090001 HH PPS REVENUE CREDIT

## 2019-02-07 PROCEDURE — 3331090001 HH PPS REVENUE CREDIT

## 2019-02-07 PROCEDURE — 3331090002 HH PPS REVENUE DEBIT

## 2019-02-08 PROCEDURE — 3331090001 HH PPS REVENUE CREDIT

## 2019-02-08 PROCEDURE — 3331090002 HH PPS REVENUE DEBIT

## 2019-02-09 PROCEDURE — 3331090001 HH PPS REVENUE CREDIT

## 2019-02-09 PROCEDURE — 3331090002 HH PPS REVENUE DEBIT

## 2019-02-10 PROCEDURE — 3331090001 HH PPS REVENUE CREDIT

## 2019-02-10 PROCEDURE — 3331090002 HH PPS REVENUE DEBIT

## 2019-02-11 PROCEDURE — 3331090001 HH PPS REVENUE CREDIT

## 2019-02-11 PROCEDURE — 3331090002 HH PPS REVENUE DEBIT

## 2019-02-12 PROCEDURE — 3331090002 HH PPS REVENUE DEBIT

## 2019-02-12 PROCEDURE — 3331090001 HH PPS REVENUE CREDIT

## 2019-02-13 PROCEDURE — 3331090002 HH PPS REVENUE DEBIT

## 2019-02-13 PROCEDURE — 3331090001 HH PPS REVENUE CREDIT

## 2019-02-14 PROCEDURE — 3331090002 HH PPS REVENUE DEBIT

## 2019-02-14 PROCEDURE — 3331090001 HH PPS REVENUE CREDIT

## 2019-02-15 PROCEDURE — 3331090002 HH PPS REVENUE DEBIT

## 2019-02-15 PROCEDURE — 3331090001 HH PPS REVENUE CREDIT

## 2019-02-16 PROCEDURE — 3331090001 HH PPS REVENUE CREDIT

## 2019-02-16 PROCEDURE — 3331090002 HH PPS REVENUE DEBIT

## 2019-02-17 PROCEDURE — 3331090001 HH PPS REVENUE CREDIT

## 2019-02-17 PROCEDURE — 3331090002 HH PPS REVENUE DEBIT

## 2019-02-18 PROCEDURE — 3331090001 HH PPS REVENUE CREDIT

## 2019-02-18 PROCEDURE — 3331090002 HH PPS REVENUE DEBIT

## 2019-02-19 PROCEDURE — 3331090002 HH PPS REVENUE DEBIT

## 2019-02-19 PROCEDURE — 3331090001 HH PPS REVENUE CREDIT

## 2019-02-20 PROCEDURE — 3331090001 HH PPS REVENUE CREDIT

## 2019-02-20 PROCEDURE — 3331090002 HH PPS REVENUE DEBIT

## 2019-02-21 PROCEDURE — 3331090001 HH PPS REVENUE CREDIT

## 2019-02-21 PROCEDURE — 3331090002 HH PPS REVENUE DEBIT

## 2019-02-22 PROCEDURE — 3331090002 HH PPS REVENUE DEBIT

## 2019-02-22 PROCEDURE — 3331090001 HH PPS REVENUE CREDIT

## 2019-02-23 PROCEDURE — 3331090001 HH PPS REVENUE CREDIT

## 2019-02-23 PROCEDURE — 3331090002 HH PPS REVENUE DEBIT

## 2019-02-24 PROCEDURE — 3331090001 HH PPS REVENUE CREDIT

## 2019-02-24 PROCEDURE — 3331090002 HH PPS REVENUE DEBIT

## 2019-02-25 PROCEDURE — 3331090002 HH PPS REVENUE DEBIT

## 2019-02-25 PROCEDURE — 3331090001 HH PPS REVENUE CREDIT

## 2019-02-26 ENCOUNTER — HOME CARE VISIT (OUTPATIENT)
Dept: SCHEDULING | Facility: HOME HEALTH | Age: 84
End: 2019-02-26
Payer: MEDICARE

## 2019-02-26 VITALS
RESPIRATION RATE: 16 BRPM | DIASTOLIC BLOOD PRESSURE: 80 MMHG | SYSTOLIC BLOOD PRESSURE: 132 MMHG | HEART RATE: 69 BPM | TEMPERATURE: 97.6 F | OXYGEN SATURATION: 98 %

## 2019-02-26 PROCEDURE — 3331090001 HH PPS REVENUE CREDIT

## 2019-02-26 PROCEDURE — G0299 HHS/HOSPICE OF RN EA 15 MIN: HCPCS

## 2019-02-26 PROCEDURE — 3331090002 HH PPS REVENUE DEBIT

## 2019-02-27 PROCEDURE — 3331090002 HH PPS REVENUE DEBIT

## 2019-02-27 PROCEDURE — 3331090001 HH PPS REVENUE CREDIT

## 2019-02-28 PROCEDURE — A4334 URINARY CATH LEG STRAP: HCPCS

## 2019-02-28 PROCEDURE — 3331090001 HH PPS REVENUE CREDIT

## 2019-02-28 PROCEDURE — 3331090002 HH PPS REVENUE DEBIT

## 2019-02-28 PROCEDURE — A4657 SYRINGE W/WO NEEDLE: HCPCS

## 2019-02-28 PROCEDURE — 3331090003 HH PPS REVENUE ADJ

## 2019-02-28 PROCEDURE — A4314 CATH W/DRAINAGE 2-WAY LATEX: HCPCS

## 2019-03-01 PROCEDURE — 400014 HH F/U

## 2019-03-01 PROCEDURE — 3331090001 HH PPS REVENUE CREDIT

## 2019-03-01 PROCEDURE — 3331090002 HH PPS REVENUE DEBIT

## 2019-03-02 PROCEDURE — 3331090002 HH PPS REVENUE DEBIT

## 2019-03-02 PROCEDURE — 3331090001 HH PPS REVENUE CREDIT

## 2019-03-03 PROCEDURE — 3331090002 HH PPS REVENUE DEBIT

## 2019-03-03 PROCEDURE — 3331090001 HH PPS REVENUE CREDIT

## 2019-03-04 PROCEDURE — 3331090002 HH PPS REVENUE DEBIT

## 2019-03-04 PROCEDURE — 3331090001 HH PPS REVENUE CREDIT

## 2019-03-05 PROCEDURE — 3331090002 HH PPS REVENUE DEBIT

## 2019-03-05 PROCEDURE — 3331090001 HH PPS REVENUE CREDIT

## 2019-03-06 PROCEDURE — 3331090001 HH PPS REVENUE CREDIT

## 2019-03-06 PROCEDURE — 3331090002 HH PPS REVENUE DEBIT

## 2019-03-07 PROCEDURE — 3331090002 HH PPS REVENUE DEBIT

## 2019-03-07 PROCEDURE — 3331090001 HH PPS REVENUE CREDIT

## 2019-03-08 PROCEDURE — 3331090001 HH PPS REVENUE CREDIT

## 2019-03-08 PROCEDURE — 3331090002 HH PPS REVENUE DEBIT

## 2019-03-09 PROCEDURE — 3331090002 HH PPS REVENUE DEBIT

## 2019-03-09 PROCEDURE — 3331090001 HH PPS REVENUE CREDIT

## 2019-03-10 PROCEDURE — 3331090001 HH PPS REVENUE CREDIT

## 2019-03-10 PROCEDURE — 3331090002 HH PPS REVENUE DEBIT

## 2019-03-11 PROCEDURE — 3331090001 HH PPS REVENUE CREDIT

## 2019-03-11 PROCEDURE — 3331090002 HH PPS REVENUE DEBIT

## 2019-03-12 PROCEDURE — 3331090002 HH PPS REVENUE DEBIT

## 2019-03-12 PROCEDURE — 3331090001 HH PPS REVENUE CREDIT

## 2019-03-13 PROCEDURE — 3331090001 HH PPS REVENUE CREDIT

## 2019-03-13 PROCEDURE — 3331090002 HH PPS REVENUE DEBIT

## 2019-03-14 PROCEDURE — 3331090002 HH PPS REVENUE DEBIT

## 2019-03-14 PROCEDURE — 3331090001 HH PPS REVENUE CREDIT

## 2019-03-15 PROCEDURE — 3331090001 HH PPS REVENUE CREDIT

## 2019-03-15 PROCEDURE — 3331090002 HH PPS REVENUE DEBIT

## 2019-03-16 PROCEDURE — 3331090001 HH PPS REVENUE CREDIT

## 2019-03-16 PROCEDURE — 3331090002 HH PPS REVENUE DEBIT

## 2019-03-17 PROCEDURE — 3331090002 HH PPS REVENUE DEBIT

## 2019-03-17 PROCEDURE — 3331090001 HH PPS REVENUE CREDIT

## 2019-03-18 PROCEDURE — 3331090002 HH PPS REVENUE DEBIT

## 2019-03-18 PROCEDURE — 3331090001 HH PPS REVENUE CREDIT

## 2019-03-19 PROCEDURE — 3331090001 HH PPS REVENUE CREDIT

## 2019-03-19 PROCEDURE — 3331090002 HH PPS REVENUE DEBIT

## 2019-03-20 PROCEDURE — 3331090001 HH PPS REVENUE CREDIT

## 2019-03-20 PROCEDURE — 3331090002 HH PPS REVENUE DEBIT

## 2019-03-21 PROCEDURE — 3331090001 HH PPS REVENUE CREDIT

## 2019-03-21 PROCEDURE — 3331090002 HH PPS REVENUE DEBIT

## 2019-03-22 PROCEDURE — 3331090002 HH PPS REVENUE DEBIT

## 2019-03-22 PROCEDURE — 3331090001 HH PPS REVENUE CREDIT

## 2019-03-23 PROCEDURE — 3331090001 HH PPS REVENUE CREDIT

## 2019-03-23 PROCEDURE — 3331090002 HH PPS REVENUE DEBIT

## 2019-03-24 PROCEDURE — 3331090002 HH PPS REVENUE DEBIT

## 2019-03-24 PROCEDURE — 3331090001 HH PPS REVENUE CREDIT

## 2019-03-25 PROCEDURE — 3331090002 HH PPS REVENUE DEBIT

## 2019-03-25 PROCEDURE — 3331090001 HH PPS REVENUE CREDIT

## 2019-03-26 ENCOUNTER — HOME CARE VISIT (OUTPATIENT)
Dept: SCHEDULING | Facility: HOME HEALTH | Age: 84
End: 2019-03-26
Payer: MEDICARE

## 2019-03-26 VITALS
SYSTOLIC BLOOD PRESSURE: 121 MMHG | OXYGEN SATURATION: 97 % | RESPIRATION RATE: 20 BRPM | HEART RATE: 79 BPM | TEMPERATURE: 98.3 F | DIASTOLIC BLOOD PRESSURE: 70 MMHG

## 2019-03-26 PROCEDURE — 3331090002 HH PPS REVENUE DEBIT

## 2019-03-26 PROCEDURE — A4657 SYRINGE W/WO NEEDLE: HCPCS

## 2019-03-26 PROCEDURE — A4314 CATH W/DRAINAGE 2-WAY LATEX: HCPCS

## 2019-03-26 PROCEDURE — 400014 HH F/U

## 2019-03-26 PROCEDURE — G0299 HHS/HOSPICE OF RN EA 15 MIN: HCPCS

## 2019-03-26 PROCEDURE — 3331090001 HH PPS REVENUE CREDIT

## 2019-03-27 PROCEDURE — 3331090001 HH PPS REVENUE CREDIT

## 2019-03-27 PROCEDURE — 3331090002 HH PPS REVENUE DEBIT

## 2019-03-28 PROCEDURE — 3331090002 HH PPS REVENUE DEBIT

## 2019-03-28 PROCEDURE — 3331090001 HH PPS REVENUE CREDIT

## 2019-03-29 PROCEDURE — 3331090002 HH PPS REVENUE DEBIT

## 2019-03-29 PROCEDURE — 3331090001 HH PPS REVENUE CREDIT

## 2019-03-30 PROCEDURE — 3331090001 HH PPS REVENUE CREDIT

## 2019-03-30 PROCEDURE — 3331090002 HH PPS REVENUE DEBIT

## 2019-03-31 PROCEDURE — 3331090002 HH PPS REVENUE DEBIT

## 2019-03-31 PROCEDURE — 3331090001 HH PPS REVENUE CREDIT

## 2019-04-01 PROCEDURE — 3331090001 HH PPS REVENUE CREDIT

## 2019-04-01 PROCEDURE — 3331090002 HH PPS REVENUE DEBIT

## 2019-04-02 PROCEDURE — 3331090002 HH PPS REVENUE DEBIT

## 2019-04-02 PROCEDURE — 3331090001 HH PPS REVENUE CREDIT

## 2019-04-03 PROCEDURE — 3331090001 HH PPS REVENUE CREDIT

## 2019-04-03 PROCEDURE — 3331090002 HH PPS REVENUE DEBIT

## 2019-04-04 PROCEDURE — 3331090001 HH PPS REVENUE CREDIT

## 2019-04-04 PROCEDURE — 3331090002 HH PPS REVENUE DEBIT

## 2019-04-05 PROCEDURE — 3331090002 HH PPS REVENUE DEBIT

## 2019-04-05 PROCEDURE — 3331090001 HH PPS REVENUE CREDIT

## 2019-04-06 PROCEDURE — 3331090001 HH PPS REVENUE CREDIT

## 2019-04-06 PROCEDURE — 3331090002 HH PPS REVENUE DEBIT

## 2019-04-07 PROCEDURE — 3331090002 HH PPS REVENUE DEBIT

## 2019-04-07 PROCEDURE — 3331090001 HH PPS REVENUE CREDIT

## 2019-04-08 PROCEDURE — 3331090002 HH PPS REVENUE DEBIT

## 2019-04-08 PROCEDURE — 3331090001 HH PPS REVENUE CREDIT

## 2019-04-09 PROCEDURE — 3331090001 HH PPS REVENUE CREDIT

## 2019-04-09 PROCEDURE — 3331090002 HH PPS REVENUE DEBIT

## 2019-04-10 PROCEDURE — 3331090002 HH PPS REVENUE DEBIT

## 2019-04-10 PROCEDURE — 3331090001 HH PPS REVENUE CREDIT

## 2019-04-11 PROCEDURE — 3331090001 HH PPS REVENUE CREDIT

## 2019-04-11 PROCEDURE — 3331090002 HH PPS REVENUE DEBIT

## 2019-04-12 PROCEDURE — 3331090001 HH PPS REVENUE CREDIT

## 2019-04-12 PROCEDURE — 3331090002 HH PPS REVENUE DEBIT

## 2019-04-13 PROCEDURE — 3331090001 HH PPS REVENUE CREDIT

## 2019-04-13 PROCEDURE — 3331090002 HH PPS REVENUE DEBIT

## 2019-04-14 PROCEDURE — 3331090002 HH PPS REVENUE DEBIT

## 2019-04-14 PROCEDURE — 3331090001 HH PPS REVENUE CREDIT

## 2019-04-15 PROCEDURE — 3331090001 HH PPS REVENUE CREDIT

## 2019-04-15 PROCEDURE — 3331090002 HH PPS REVENUE DEBIT

## 2019-04-16 PROCEDURE — 3331090001 HH PPS REVENUE CREDIT

## 2019-04-16 PROCEDURE — 3331090002 HH PPS REVENUE DEBIT

## 2019-04-17 PROCEDURE — 3331090002 HH PPS REVENUE DEBIT

## 2019-04-17 PROCEDURE — 3331090001 HH PPS REVENUE CREDIT

## 2019-04-18 PROCEDURE — 3331090001 HH PPS REVENUE CREDIT

## 2019-04-18 PROCEDURE — 3331090002 HH PPS REVENUE DEBIT

## 2019-04-19 PROCEDURE — 3331090002 HH PPS REVENUE DEBIT

## 2019-04-19 PROCEDURE — 3331090001 HH PPS REVENUE CREDIT

## 2019-04-20 PROCEDURE — 3331090001 HH PPS REVENUE CREDIT

## 2019-04-20 PROCEDURE — 3331090002 HH PPS REVENUE DEBIT

## 2019-04-21 PROCEDURE — 3331090002 HH PPS REVENUE DEBIT

## 2019-04-21 PROCEDURE — 3331090001 HH PPS REVENUE CREDIT

## 2019-04-22 PROCEDURE — 3331090002 HH PPS REVENUE DEBIT

## 2019-04-22 PROCEDURE — 3331090001 HH PPS REVENUE CREDIT

## 2019-04-23 ENCOUNTER — HOME CARE VISIT (OUTPATIENT)
Dept: SCHEDULING | Facility: HOME HEALTH | Age: 84
End: 2019-04-23
Payer: MEDICARE

## 2019-04-23 VITALS
TEMPERATURE: 97.9 F | SYSTOLIC BLOOD PRESSURE: 123 MMHG | RESPIRATION RATE: 18 BRPM | DIASTOLIC BLOOD PRESSURE: 62 MMHG | HEART RATE: 65 BPM | OXYGEN SATURATION: 96 %

## 2019-04-23 PROCEDURE — A4334 URINARY CATH LEG STRAP: HCPCS

## 2019-04-23 PROCEDURE — 3331090001 HH PPS REVENUE CREDIT

## 2019-04-23 PROCEDURE — G0299 HHS/HOSPICE OF RN EA 15 MIN: HCPCS

## 2019-04-23 PROCEDURE — A4314 CATH W/DRAINAGE 2-WAY LATEX: HCPCS

## 2019-04-23 PROCEDURE — 3331090002 HH PPS REVENUE DEBIT

## 2019-04-23 PROCEDURE — A4657 SYRINGE W/WO NEEDLE: HCPCS

## 2019-04-24 PROCEDURE — 3331090001 HH PPS REVENUE CREDIT

## 2019-04-24 PROCEDURE — 3331090002 HH PPS REVENUE DEBIT

## 2019-04-25 PROCEDURE — 3331090001 HH PPS REVENUE CREDIT

## 2019-04-25 PROCEDURE — 3331090002 HH PPS REVENUE DEBIT

## 2019-04-26 PROCEDURE — 3331090001 HH PPS REVENUE CREDIT

## 2019-04-26 PROCEDURE — 3331090002 HH PPS REVENUE DEBIT

## 2019-04-27 PROCEDURE — 3331090002 HH PPS REVENUE DEBIT

## 2019-04-27 PROCEDURE — 3331090001 HH PPS REVENUE CREDIT

## 2019-04-28 PROCEDURE — 3331090001 HH PPS REVENUE CREDIT

## 2019-04-28 PROCEDURE — 3331090002 HH PPS REVENUE DEBIT

## 2019-04-29 ENCOUNTER — HOME CARE VISIT (OUTPATIENT)
Dept: SCHEDULING | Facility: HOME HEALTH | Age: 84
End: 2019-04-29
Payer: MEDICARE

## 2019-04-29 VITALS
OXYGEN SATURATION: 99 % | DIASTOLIC BLOOD PRESSURE: 73 MMHG | RESPIRATION RATE: 18 BRPM | SYSTOLIC BLOOD PRESSURE: 140 MMHG | HEART RATE: 89 BPM | TEMPERATURE: 97.8 F

## 2019-04-29 PROCEDURE — G0299 HHS/HOSPICE OF RN EA 15 MIN: HCPCS

## 2019-04-29 PROCEDURE — 3331090001 HH PPS REVENUE CREDIT

## 2019-04-29 PROCEDURE — 3331090002 HH PPS REVENUE DEBIT

## 2019-04-29 PROCEDURE — 3331090003 HH PPS REVENUE ADJ

## 2019-04-30 PROCEDURE — 3331090001 HH PPS REVENUE CREDIT

## 2019-04-30 PROCEDURE — 3331090002 HH PPS REVENUE DEBIT

## 2019-05-01 PROCEDURE — 3331090002 HH PPS REVENUE DEBIT

## 2019-05-01 PROCEDURE — 3331090001 HH PPS REVENUE CREDIT

## 2019-05-02 PROCEDURE — 3331090001 HH PPS REVENUE CREDIT

## 2019-05-02 PROCEDURE — 3331090002 HH PPS REVENUE DEBIT

## 2019-05-03 PROCEDURE — 3331090001 HH PPS REVENUE CREDIT

## 2019-05-03 PROCEDURE — 3331090002 HH PPS REVENUE DEBIT

## 2019-05-04 PROCEDURE — 3331090001 HH PPS REVENUE CREDIT

## 2019-05-04 PROCEDURE — 3331090002 HH PPS REVENUE DEBIT

## 2019-05-05 PROCEDURE — 3331090001 HH PPS REVENUE CREDIT

## 2019-05-05 PROCEDURE — 3331090002 HH PPS REVENUE DEBIT

## 2019-05-06 ENCOUNTER — HOME CARE VISIT (OUTPATIENT)
Dept: SCHEDULING | Facility: HOME HEALTH | Age: 84
End: 2019-05-06
Payer: MEDICARE

## 2019-05-06 VITALS
RESPIRATION RATE: 16 BRPM | BODY MASS INDEX: 29.54 KG/M2 | SYSTOLIC BLOOD PRESSURE: 129 MMHG | HEIGHT: 72 IN | DIASTOLIC BLOOD PRESSURE: 68 MMHG | HEART RATE: 66 BPM | TEMPERATURE: 99 F

## 2019-05-06 PROCEDURE — 3331090001 HH PPS REVENUE CREDIT

## 2019-05-06 PROCEDURE — 400014 HH F/U

## 2019-05-06 PROCEDURE — 3331090002 HH PPS REVENUE DEBIT

## 2019-05-06 PROCEDURE — G0151 HHCP-SERV OF PT,EA 15 MIN: HCPCS

## 2019-05-07 PROCEDURE — 3331090002 HH PPS REVENUE DEBIT

## 2019-05-07 PROCEDURE — 3331090001 HH PPS REVENUE CREDIT

## 2019-05-08 ENCOUNTER — HOME CARE VISIT (OUTPATIENT)
Dept: SCHEDULING | Facility: HOME HEALTH | Age: 84
End: 2019-05-08
Payer: MEDICARE

## 2019-05-08 VITALS
RESPIRATION RATE: 16 BRPM | HEART RATE: 77 BPM | DIASTOLIC BLOOD PRESSURE: 80 MMHG | SYSTOLIC BLOOD PRESSURE: 125 MMHG | TEMPERATURE: 97.9 F

## 2019-05-08 PROCEDURE — 3331090001 HH PPS REVENUE CREDIT

## 2019-05-08 PROCEDURE — 3331090002 HH PPS REVENUE DEBIT

## 2019-05-08 PROCEDURE — G0151 HHCP-SERV OF PT,EA 15 MIN: HCPCS

## 2019-05-09 PROCEDURE — 3331090002 HH PPS REVENUE DEBIT

## 2019-05-09 PROCEDURE — 3331090001 HH PPS REVENUE CREDIT

## 2019-05-10 PROCEDURE — 3331090002 HH PPS REVENUE DEBIT

## 2019-05-10 PROCEDURE — 3331090001 HH PPS REVENUE CREDIT

## 2019-05-11 PROCEDURE — 3331090001 HH PPS REVENUE CREDIT

## 2019-05-11 PROCEDURE — 3331090002 HH PPS REVENUE DEBIT

## 2019-05-12 PROCEDURE — 3331090001 HH PPS REVENUE CREDIT

## 2019-05-12 PROCEDURE — 3331090002 HH PPS REVENUE DEBIT

## 2019-05-13 PROCEDURE — 3331090002 HH PPS REVENUE DEBIT

## 2019-05-13 PROCEDURE — 3331090001 HH PPS REVENUE CREDIT

## 2019-05-14 ENCOUNTER — HOME CARE VISIT (OUTPATIENT)
Dept: SCHEDULING | Facility: HOME HEALTH | Age: 84
End: 2019-05-14
Payer: MEDICARE

## 2019-05-14 VITALS
HEART RATE: 72 BPM | RESPIRATION RATE: 18 BRPM | SYSTOLIC BLOOD PRESSURE: 122 MMHG | TEMPERATURE: 98.4 F | DIASTOLIC BLOOD PRESSURE: 74 MMHG

## 2019-05-14 PROCEDURE — 3331090001 HH PPS REVENUE CREDIT

## 2019-05-14 PROCEDURE — G0157 HHC PT ASSISTANT EA 15: HCPCS

## 2019-05-14 PROCEDURE — 3331090002 HH PPS REVENUE DEBIT

## 2019-05-15 PROCEDURE — 3331090001 HH PPS REVENUE CREDIT

## 2019-05-15 PROCEDURE — 3331090002 HH PPS REVENUE DEBIT

## 2019-05-16 PROCEDURE — 3331090002 HH PPS REVENUE DEBIT

## 2019-05-16 PROCEDURE — 3331090001 HH PPS REVENUE CREDIT

## 2019-05-17 ENCOUNTER — HOME CARE VISIT (OUTPATIENT)
Dept: SCHEDULING | Facility: HOME HEALTH | Age: 84
End: 2019-05-17
Payer: MEDICARE

## 2019-05-17 PROCEDURE — G0157 HHC PT ASSISTANT EA 15: HCPCS

## 2019-05-17 PROCEDURE — 3331090001 HH PPS REVENUE CREDIT

## 2019-05-17 PROCEDURE — 3331090002 HH PPS REVENUE DEBIT

## 2019-05-18 PROCEDURE — 3331090001 HH PPS REVENUE CREDIT

## 2019-05-18 PROCEDURE — 3331090002 HH PPS REVENUE DEBIT

## 2019-05-19 VITALS
RESPIRATION RATE: 18 BRPM | DIASTOLIC BLOOD PRESSURE: 68 MMHG | SYSTOLIC BLOOD PRESSURE: 118 MMHG | HEART RATE: 74 BPM | TEMPERATURE: 98.3 F

## 2019-05-19 PROCEDURE — 3331090001 HH PPS REVENUE CREDIT

## 2019-05-19 PROCEDURE — 3331090002 HH PPS REVENUE DEBIT

## 2019-05-20 PROCEDURE — 3331090002 HH PPS REVENUE DEBIT

## 2019-05-20 PROCEDURE — 3331090001 HH PPS REVENUE CREDIT

## 2019-05-21 ENCOUNTER — HOME CARE VISIT (OUTPATIENT)
Dept: SCHEDULING | Facility: HOME HEALTH | Age: 84
End: 2019-05-21
Payer: MEDICARE

## 2019-05-21 VITALS
TEMPERATURE: 98.3 F | SYSTOLIC BLOOD PRESSURE: 120 MMHG | DIASTOLIC BLOOD PRESSURE: 66 MMHG | RESPIRATION RATE: 17 BRPM | HEART RATE: 66 BPM

## 2019-05-21 VITALS
HEART RATE: 74 BPM | OXYGEN SATURATION: 97 % | SYSTOLIC BLOOD PRESSURE: 119 MMHG | TEMPERATURE: 97.2 F | RESPIRATION RATE: 18 BRPM | DIASTOLIC BLOOD PRESSURE: 65 MMHG

## 2019-05-21 PROCEDURE — 3331090001 HH PPS REVENUE CREDIT

## 2019-05-21 PROCEDURE — A4657 SYRINGE W/WO NEEDLE: HCPCS

## 2019-05-21 PROCEDURE — G0299 HHS/HOSPICE OF RN EA 15 MIN: HCPCS

## 2019-05-21 PROCEDURE — 3331090002 HH PPS REVENUE DEBIT

## 2019-05-21 PROCEDURE — A4314 CATH W/DRAINAGE 2-WAY LATEX: HCPCS

## 2019-05-21 PROCEDURE — G0157 HHC PT ASSISTANT EA 15: HCPCS

## 2019-05-22 PROCEDURE — 3331090001 HH PPS REVENUE CREDIT

## 2019-05-22 PROCEDURE — 3331090002 HH PPS REVENUE DEBIT

## 2019-05-23 ENCOUNTER — HOME CARE VISIT (OUTPATIENT)
Dept: SCHEDULING | Facility: HOME HEALTH | Age: 84
End: 2019-05-23
Payer: MEDICARE

## 2019-05-23 PROCEDURE — G0157 HHC PT ASSISTANT EA 15: HCPCS

## 2019-05-23 PROCEDURE — 3331090001 HH PPS REVENUE CREDIT

## 2019-05-23 PROCEDURE — 3331090002 HH PPS REVENUE DEBIT

## 2019-05-24 PROCEDURE — 3331090002 HH PPS REVENUE DEBIT

## 2019-05-24 PROCEDURE — 3331090001 HH PPS REVENUE CREDIT

## 2019-05-25 PROCEDURE — 3331090002 HH PPS REVENUE DEBIT

## 2019-05-25 PROCEDURE — 3331090001 HH PPS REVENUE CREDIT

## 2019-05-26 PROCEDURE — 3331090002 HH PPS REVENUE DEBIT

## 2019-05-26 PROCEDURE — 3331090001 HH PPS REVENUE CREDIT

## 2019-05-27 ENCOUNTER — HOME CARE VISIT (OUTPATIENT)
Dept: SCHEDULING | Facility: HOME HEALTH | Age: 84
End: 2019-05-27
Payer: MEDICARE

## 2019-05-27 VITALS
RESPIRATION RATE: 16 BRPM | SYSTOLIC BLOOD PRESSURE: 120 MMHG | TEMPERATURE: 97.5 F | DIASTOLIC BLOOD PRESSURE: 70 MMHG | HEART RATE: 75 BPM

## 2019-05-27 VITALS
TEMPERATURE: 97.3 F | RESPIRATION RATE: 18 BRPM | SYSTOLIC BLOOD PRESSURE: 150 MMHG | DIASTOLIC BLOOD PRESSURE: 82 MMHG | HEART RATE: 72 BPM

## 2019-05-27 PROCEDURE — 3331090001 HH PPS REVENUE CREDIT

## 2019-05-27 PROCEDURE — 3331090002 HH PPS REVENUE DEBIT

## 2019-05-27 PROCEDURE — G0151 HHCP-SERV OF PT,EA 15 MIN: HCPCS

## 2019-05-28 PROCEDURE — 3331090002 HH PPS REVENUE DEBIT

## 2019-05-28 PROCEDURE — 3331090001 HH PPS REVENUE CREDIT

## 2019-05-29 ENCOUNTER — HOME CARE VISIT (OUTPATIENT)
Dept: HOME HEALTH SERVICES | Facility: HOME HEALTH | Age: 84
End: 2019-05-29
Payer: MEDICARE

## 2019-05-29 PROCEDURE — 3331090001 HH PPS REVENUE CREDIT

## 2019-05-29 PROCEDURE — 3331090002 HH PPS REVENUE DEBIT

## 2019-05-30 PROCEDURE — 3331090002 HH PPS REVENUE DEBIT

## 2019-05-30 PROCEDURE — 3331090001 HH PPS REVENUE CREDIT

## 2019-05-31 PROCEDURE — 3331090001 HH PPS REVENUE CREDIT

## 2019-05-31 PROCEDURE — 3331090002 HH PPS REVENUE DEBIT

## 2019-06-01 PROCEDURE — 3331090002 HH PPS REVENUE DEBIT

## 2019-06-01 PROCEDURE — 3331090001 HH PPS REVENUE CREDIT

## 2019-06-02 PROCEDURE — 3331090002 HH PPS REVENUE DEBIT

## 2019-06-02 PROCEDURE — 3331090001 HH PPS REVENUE CREDIT

## 2019-06-03 PROCEDURE — 3331090002 HH PPS REVENUE DEBIT

## 2019-06-03 PROCEDURE — 3331090001 HH PPS REVENUE CREDIT

## 2019-06-04 PROCEDURE — 3331090002 HH PPS REVENUE DEBIT

## 2019-06-04 PROCEDURE — 3331090001 HH PPS REVENUE CREDIT

## 2019-06-05 PROCEDURE — 3331090002 HH PPS REVENUE DEBIT

## 2019-06-05 PROCEDURE — 3331090001 HH PPS REVENUE CREDIT

## 2019-06-06 PROCEDURE — 3331090001 HH PPS REVENUE CREDIT

## 2019-06-06 PROCEDURE — 3331090002 HH PPS REVENUE DEBIT

## 2019-06-07 PROCEDURE — 3331090001 HH PPS REVENUE CREDIT

## 2019-06-07 PROCEDURE — 3331090002 HH PPS REVENUE DEBIT

## 2019-06-08 PROCEDURE — 3331090001 HH PPS REVENUE CREDIT

## 2019-06-08 PROCEDURE — 3331090002 HH PPS REVENUE DEBIT

## 2019-06-09 PROCEDURE — 3331090001 HH PPS REVENUE CREDIT

## 2019-06-09 PROCEDURE — 3331090002 HH PPS REVENUE DEBIT

## 2019-06-10 PROCEDURE — 3331090002 HH PPS REVENUE DEBIT

## 2019-06-10 PROCEDURE — 3331090001 HH PPS REVENUE CREDIT

## 2019-06-11 PROCEDURE — 3331090001 HH PPS REVENUE CREDIT

## 2019-06-11 PROCEDURE — 3331090002 HH PPS REVENUE DEBIT

## 2019-06-12 PROCEDURE — 3331090002 HH PPS REVENUE DEBIT

## 2019-06-12 PROCEDURE — 3331090001 HH PPS REVENUE CREDIT

## 2019-06-13 PROCEDURE — 3331090002 HH PPS REVENUE DEBIT

## 2019-06-13 PROCEDURE — 3331090001 HH PPS REVENUE CREDIT

## 2019-06-14 PROCEDURE — 3331090002 HH PPS REVENUE DEBIT

## 2019-06-14 PROCEDURE — 3331090001 HH PPS REVENUE CREDIT

## 2019-06-15 PROCEDURE — 3331090001 HH PPS REVENUE CREDIT

## 2019-06-15 PROCEDURE — 3331090002 HH PPS REVENUE DEBIT

## 2019-06-16 PROCEDURE — 3331090001 HH PPS REVENUE CREDIT

## 2019-06-16 PROCEDURE — 3331090002 HH PPS REVENUE DEBIT

## 2019-06-17 PROCEDURE — 3331090001 HH PPS REVENUE CREDIT

## 2019-06-17 PROCEDURE — 3331090002 HH PPS REVENUE DEBIT

## 2019-06-18 ENCOUNTER — HOME CARE VISIT (OUTPATIENT)
Dept: SCHEDULING | Facility: HOME HEALTH | Age: 84
End: 2019-06-18
Payer: MEDICARE

## 2019-06-18 VITALS — RESPIRATION RATE: 18 BRPM | HEART RATE: 77 BPM | TEMPERATURE: 97.9 F | OXYGEN SATURATION: 99 %

## 2019-06-18 PROCEDURE — 3331090001 HH PPS REVENUE CREDIT

## 2019-06-18 PROCEDURE — A4314 CATH W/DRAINAGE 2-WAY LATEX: HCPCS

## 2019-06-18 PROCEDURE — A4334 URINARY CATH LEG STRAP: HCPCS

## 2019-06-18 PROCEDURE — A4657 SYRINGE W/WO NEEDLE: HCPCS

## 2019-06-18 PROCEDURE — 3331090002 HH PPS REVENUE DEBIT

## 2019-06-18 PROCEDURE — G0299 HHS/HOSPICE OF RN EA 15 MIN: HCPCS

## 2019-06-19 PROCEDURE — 3331090002 HH PPS REVENUE DEBIT

## 2019-06-19 PROCEDURE — 3331090001 HH PPS REVENUE CREDIT

## 2019-06-20 PROCEDURE — 3331090002 HH PPS REVENUE DEBIT

## 2019-06-20 PROCEDURE — 3331090001 HH PPS REVENUE CREDIT

## 2019-06-21 PROCEDURE — 3331090001 HH PPS REVENUE CREDIT

## 2019-06-21 PROCEDURE — 3331090002 HH PPS REVENUE DEBIT

## 2019-06-22 PROCEDURE — 3331090002 HH PPS REVENUE DEBIT

## 2019-06-22 PROCEDURE — 3331090001 HH PPS REVENUE CREDIT

## 2019-06-23 PROCEDURE — 3331090002 HH PPS REVENUE DEBIT

## 2019-06-23 PROCEDURE — 3331090001 HH PPS REVENUE CREDIT

## 2019-06-24 PROCEDURE — 3331090002 HH PPS REVENUE DEBIT

## 2019-06-24 PROCEDURE — 3331090001 HH PPS REVENUE CREDIT

## 2019-06-25 ENCOUNTER — HOME CARE VISIT (OUTPATIENT)
Dept: SCHEDULING | Facility: HOME HEALTH | Age: 84
End: 2019-06-25
Payer: MEDICARE

## 2019-06-25 VITALS
HEART RATE: 60 BPM | TEMPERATURE: 98.2 F | OXYGEN SATURATION: 99 % | RESPIRATION RATE: 18 BRPM | DIASTOLIC BLOOD PRESSURE: 64 MMHG | SYSTOLIC BLOOD PRESSURE: 130 MMHG

## 2019-06-25 PROCEDURE — 3331090001 HH PPS REVENUE CREDIT

## 2019-06-25 PROCEDURE — 3331090002 HH PPS REVENUE DEBIT

## 2019-06-25 PROCEDURE — G0299 HHS/HOSPICE OF RN EA 15 MIN: HCPCS

## 2019-06-26 ENCOUNTER — HOME CARE VISIT (OUTPATIENT)
Dept: SCHEDULING | Facility: HOME HEALTH | Age: 84
End: 2019-06-26
Payer: MEDICARE

## 2019-06-26 VITALS
SYSTOLIC BLOOD PRESSURE: 120 MMHG | RESPIRATION RATE: 16 BRPM | HEART RATE: 72 BPM | DIASTOLIC BLOOD PRESSURE: 67 MMHG | OXYGEN SATURATION: 94 % | TEMPERATURE: 98.9 F

## 2019-06-26 PROCEDURE — 3331090002 HH PPS REVENUE DEBIT

## 2019-06-26 PROCEDURE — 3331090001 HH PPS REVENUE CREDIT

## 2019-06-26 PROCEDURE — G0299 HHS/HOSPICE OF RN EA 15 MIN: HCPCS

## 2019-06-27 PROCEDURE — 3331090002 HH PPS REVENUE DEBIT

## 2019-06-27 PROCEDURE — 3331090001 HH PPS REVENUE CREDIT

## 2019-06-28 PROCEDURE — 3331090003 HH PPS REVENUE ADJ

## 2019-06-28 PROCEDURE — 3331090002 HH PPS REVENUE DEBIT

## 2019-06-28 PROCEDURE — 3331090001 HH PPS REVENUE CREDIT

## 2019-06-29 PROCEDURE — 3331090002 HH PPS REVENUE DEBIT

## 2019-06-29 PROCEDURE — 400014 HH F/U

## 2019-06-29 PROCEDURE — 3331090001 HH PPS REVENUE CREDIT

## 2019-06-30 PROCEDURE — 3331090002 HH PPS REVENUE DEBIT

## 2019-06-30 PROCEDURE — 3331090001 HH PPS REVENUE CREDIT

## 2019-07-01 PROCEDURE — 3331090002 HH PPS REVENUE DEBIT

## 2019-07-01 PROCEDURE — 3331090001 HH PPS REVENUE CREDIT

## 2019-07-02 PROCEDURE — 3331090001 HH PPS REVENUE CREDIT

## 2019-07-02 PROCEDURE — 3331090002 HH PPS REVENUE DEBIT

## 2019-07-03 PROCEDURE — 3331090001 HH PPS REVENUE CREDIT

## 2019-07-03 PROCEDURE — 3331090002 HH PPS REVENUE DEBIT

## 2019-07-04 PROCEDURE — 3331090002 HH PPS REVENUE DEBIT

## 2019-07-04 PROCEDURE — 3331090001 HH PPS REVENUE CREDIT

## 2019-07-05 PROCEDURE — 3331090002 HH PPS REVENUE DEBIT

## 2019-07-05 PROCEDURE — 3331090001 HH PPS REVENUE CREDIT

## 2019-07-06 PROCEDURE — 3331090001 HH PPS REVENUE CREDIT

## 2019-07-06 PROCEDURE — 3331090002 HH PPS REVENUE DEBIT

## 2019-07-07 PROCEDURE — 3331090002 HH PPS REVENUE DEBIT

## 2019-07-07 PROCEDURE — 3331090001 HH PPS REVENUE CREDIT

## 2019-07-08 PROCEDURE — 3331090001 HH PPS REVENUE CREDIT

## 2019-07-08 PROCEDURE — 3331090002 HH PPS REVENUE DEBIT

## 2019-07-09 PROCEDURE — 3331090002 HH PPS REVENUE DEBIT

## 2019-07-09 PROCEDURE — 3331090001 HH PPS REVENUE CREDIT

## 2019-07-10 PROCEDURE — 3331090001 HH PPS REVENUE CREDIT

## 2019-07-10 PROCEDURE — 3331090002 HH PPS REVENUE DEBIT

## 2019-07-11 PROCEDURE — 3331090002 HH PPS REVENUE DEBIT

## 2019-07-11 PROCEDURE — 3331090001 HH PPS REVENUE CREDIT

## 2019-07-12 PROCEDURE — 3331090001 HH PPS REVENUE CREDIT

## 2019-07-12 PROCEDURE — 3331090002 HH PPS REVENUE DEBIT

## 2019-07-13 PROCEDURE — 3331090001 HH PPS REVENUE CREDIT

## 2019-07-13 PROCEDURE — 3331090002 HH PPS REVENUE DEBIT

## 2019-07-14 PROCEDURE — 3331090001 HH PPS REVENUE CREDIT

## 2019-07-14 PROCEDURE — 3331090002 HH PPS REVENUE DEBIT

## 2019-07-15 PROCEDURE — 3331090001 HH PPS REVENUE CREDIT

## 2019-07-15 PROCEDURE — 3331090002 HH PPS REVENUE DEBIT

## 2019-07-16 PROCEDURE — 3331090001 HH PPS REVENUE CREDIT

## 2019-07-16 PROCEDURE — 3331090002 HH PPS REVENUE DEBIT

## 2019-07-17 PROCEDURE — 3331090001 HH PPS REVENUE CREDIT

## 2019-07-17 PROCEDURE — 3331090002 HH PPS REVENUE DEBIT

## 2019-07-18 PROCEDURE — 3331090001 HH PPS REVENUE CREDIT

## 2019-07-18 PROCEDURE — 3331090002 HH PPS REVENUE DEBIT

## 2019-07-19 PROCEDURE — 3331090002 HH PPS REVENUE DEBIT

## 2019-07-19 PROCEDURE — 3331090001 HH PPS REVENUE CREDIT

## 2019-07-20 PROCEDURE — 3331090001 HH PPS REVENUE CREDIT

## 2019-07-20 PROCEDURE — 3331090002 HH PPS REVENUE DEBIT

## 2019-07-21 PROCEDURE — 3331090001 HH PPS REVENUE CREDIT

## 2019-07-21 PROCEDURE — 3331090002 HH PPS REVENUE DEBIT

## 2019-07-22 PROCEDURE — 3331090001 HH PPS REVENUE CREDIT

## 2019-07-22 PROCEDURE — 3331090002 HH PPS REVENUE DEBIT

## 2019-07-23 PROCEDURE — 3331090001 HH PPS REVENUE CREDIT

## 2019-07-23 PROCEDURE — 3331090002 HH PPS REVENUE DEBIT

## 2019-07-24 ENCOUNTER — HOME CARE VISIT (OUTPATIENT)
Dept: SCHEDULING | Facility: HOME HEALTH | Age: 84
End: 2019-07-24
Payer: MEDICARE

## 2019-07-24 VITALS
DIASTOLIC BLOOD PRESSURE: 78 MMHG | HEART RATE: 70 BPM | OXYGEN SATURATION: 98 % | TEMPERATURE: 98.9 F | SYSTOLIC BLOOD PRESSURE: 130 MMHG

## 2019-07-24 PROCEDURE — A4314 CATH W/DRAINAGE 2-WAY LATEX: HCPCS

## 2019-07-24 PROCEDURE — 3331090002 HH PPS REVENUE DEBIT

## 2019-07-24 PROCEDURE — A4657 SYRINGE W/WO NEEDLE: HCPCS

## 2019-07-24 PROCEDURE — 400014 HH F/U

## 2019-07-24 PROCEDURE — 3331090001 HH PPS REVENUE CREDIT

## 2019-07-24 PROCEDURE — G0299 HHS/HOSPICE OF RN EA 15 MIN: HCPCS

## 2019-07-25 PROCEDURE — 3331090002 HH PPS REVENUE DEBIT

## 2019-07-25 PROCEDURE — 3331090001 HH PPS REVENUE CREDIT

## 2019-07-26 PROCEDURE — 3331090002 HH PPS REVENUE DEBIT

## 2019-07-26 PROCEDURE — 3331090001 HH PPS REVENUE CREDIT

## 2019-07-27 PROCEDURE — 3331090002 HH PPS REVENUE DEBIT

## 2019-07-27 PROCEDURE — 3331090001 HH PPS REVENUE CREDIT

## 2019-07-28 PROCEDURE — 3331090002 HH PPS REVENUE DEBIT

## 2019-07-28 PROCEDURE — 3331090001 HH PPS REVENUE CREDIT

## 2019-07-29 PROCEDURE — 3331090002 HH PPS REVENUE DEBIT

## 2019-07-29 PROCEDURE — 3331090001 HH PPS REVENUE CREDIT

## 2019-07-30 PROCEDURE — 3331090001 HH PPS REVENUE CREDIT

## 2019-07-30 PROCEDURE — 3331090002 HH PPS REVENUE DEBIT

## 2019-07-31 PROCEDURE — 3331090002 HH PPS REVENUE DEBIT

## 2019-07-31 PROCEDURE — 3331090001 HH PPS REVENUE CREDIT

## 2019-08-01 PROCEDURE — 3331090002 HH PPS REVENUE DEBIT

## 2019-08-01 PROCEDURE — 3331090001 HH PPS REVENUE CREDIT

## 2019-08-02 PROCEDURE — 3331090001 HH PPS REVENUE CREDIT

## 2019-08-02 PROCEDURE — 3331090002 HH PPS REVENUE DEBIT

## 2019-08-03 PROCEDURE — 3331090001 HH PPS REVENUE CREDIT

## 2019-08-03 PROCEDURE — 3331090002 HH PPS REVENUE DEBIT

## 2019-08-04 PROCEDURE — 3331090002 HH PPS REVENUE DEBIT

## 2019-08-04 PROCEDURE — 3331090001 HH PPS REVENUE CREDIT

## 2019-08-05 PROCEDURE — 3331090001 HH PPS REVENUE CREDIT

## 2019-08-05 PROCEDURE — 3331090002 HH PPS REVENUE DEBIT

## 2019-08-06 PROCEDURE — 3331090001 HH PPS REVENUE CREDIT

## 2019-08-06 PROCEDURE — 3331090002 HH PPS REVENUE DEBIT

## 2019-08-07 PROCEDURE — 3331090002 HH PPS REVENUE DEBIT

## 2019-08-07 PROCEDURE — 3331090001 HH PPS REVENUE CREDIT

## 2019-08-08 PROCEDURE — 3331090001 HH PPS REVENUE CREDIT

## 2019-08-08 PROCEDURE — 3331090002 HH PPS REVENUE DEBIT

## 2019-08-09 PROCEDURE — 3331090001 HH PPS REVENUE CREDIT

## 2019-08-09 PROCEDURE — 3331090002 HH PPS REVENUE DEBIT

## 2019-08-10 PROCEDURE — 3331090002 HH PPS REVENUE DEBIT

## 2019-08-10 PROCEDURE — 3331090001 HH PPS REVENUE CREDIT

## 2019-08-11 PROCEDURE — 3331090002 HH PPS REVENUE DEBIT

## 2019-08-11 PROCEDURE — 3331090001 HH PPS REVENUE CREDIT

## 2019-08-12 PROCEDURE — 3331090002 HH PPS REVENUE DEBIT

## 2019-08-12 PROCEDURE — 3331090001 HH PPS REVENUE CREDIT

## 2019-08-13 PROCEDURE — 3331090001 HH PPS REVENUE CREDIT

## 2019-08-13 PROCEDURE — 3331090002 HH PPS REVENUE DEBIT

## 2019-08-14 PROCEDURE — 3331090002 HH PPS REVENUE DEBIT

## 2019-08-14 PROCEDURE — 3331090001 HH PPS REVENUE CREDIT

## 2019-08-15 PROCEDURE — 3331090002 HH PPS REVENUE DEBIT

## 2019-08-15 PROCEDURE — 3331090001 HH PPS REVENUE CREDIT

## 2019-08-16 PROCEDURE — 3331090002 HH PPS REVENUE DEBIT

## 2019-08-16 PROCEDURE — 3331090001 HH PPS REVENUE CREDIT

## 2019-08-17 PROCEDURE — 3331090001 HH PPS REVENUE CREDIT

## 2019-08-17 PROCEDURE — 3331090002 HH PPS REVENUE DEBIT

## 2019-08-18 PROCEDURE — 3331090002 HH PPS REVENUE DEBIT

## 2019-08-18 PROCEDURE — 3331090001 HH PPS REVENUE CREDIT

## 2019-08-19 PROCEDURE — 3331090001 HH PPS REVENUE CREDIT

## 2019-08-19 PROCEDURE — 3331090002 HH PPS REVENUE DEBIT

## 2019-08-20 PROCEDURE — 3331090001 HH PPS REVENUE CREDIT

## 2019-08-20 PROCEDURE — 3331090002 HH PPS REVENUE DEBIT

## 2019-08-21 ENCOUNTER — HOME CARE VISIT (OUTPATIENT)
Dept: SCHEDULING | Facility: HOME HEALTH | Age: 84
End: 2019-08-21
Payer: MEDICARE

## 2019-08-21 VITALS
OXYGEN SATURATION: 97 % | HEART RATE: 92 BPM | TEMPERATURE: 97.9 F | SYSTOLIC BLOOD PRESSURE: 132 MMHG | DIASTOLIC BLOOD PRESSURE: 74 MMHG

## 2019-08-21 PROCEDURE — 3331090002 HH PPS REVENUE DEBIT

## 2019-08-21 PROCEDURE — 3331090001 HH PPS REVENUE CREDIT

## 2019-08-21 PROCEDURE — G0299 HHS/HOSPICE OF RN EA 15 MIN: HCPCS

## 2019-08-22 PROCEDURE — 3331090002 HH PPS REVENUE DEBIT

## 2019-08-22 PROCEDURE — 3331090001 HH PPS REVENUE CREDIT

## 2019-08-23 PROCEDURE — A4314 CATH W/DRAINAGE 2-WAY LATEX: HCPCS

## 2019-08-23 PROCEDURE — 3331090002 HH PPS REVENUE DEBIT

## 2019-08-23 PROCEDURE — 3331090001 HH PPS REVENUE CREDIT

## 2019-08-23 PROCEDURE — A4657 SYRINGE W/WO NEEDLE: HCPCS

## 2019-08-24 PROCEDURE — 3331090002 HH PPS REVENUE DEBIT

## 2019-08-24 PROCEDURE — 3331090001 HH PPS REVENUE CREDIT

## 2019-08-25 PROCEDURE — 3331090001 HH PPS REVENUE CREDIT

## 2019-08-25 PROCEDURE — 3331090002 HH PPS REVENUE DEBIT

## 2019-08-26 PROCEDURE — 3331090002 HH PPS REVENUE DEBIT

## 2019-08-26 PROCEDURE — 3331090001 HH PPS REVENUE CREDIT

## 2019-08-27 ENCOUNTER — HOME CARE VISIT (OUTPATIENT)
Dept: SCHEDULING | Facility: HOME HEALTH | Age: 84
End: 2019-08-27
Payer: MEDICARE

## 2019-08-27 VITALS
OXYGEN SATURATION: 97 % | TEMPERATURE: 97.7 F | SYSTOLIC BLOOD PRESSURE: 122 MMHG | HEART RATE: 60 BPM | DIASTOLIC BLOOD PRESSURE: 68 MMHG | RESPIRATION RATE: 16 BRPM

## 2019-08-27 PROCEDURE — G0299 HHS/HOSPICE OF RN EA 15 MIN: HCPCS

## 2019-08-27 PROCEDURE — 3331090001 HH PPS REVENUE CREDIT

## 2019-08-27 PROCEDURE — 3331090003 HH PPS REVENUE ADJ

## 2019-08-27 PROCEDURE — 3331090002 HH PPS REVENUE DEBIT

## 2019-08-28 PROCEDURE — 3331090001 HH PPS REVENUE CREDIT

## 2019-08-28 PROCEDURE — 3331090002 HH PPS REVENUE DEBIT

## 2019-08-29 PROCEDURE — 3331090001 HH PPS REVENUE CREDIT

## 2019-08-29 PROCEDURE — 3331090002 HH PPS REVENUE DEBIT

## 2019-08-30 ENCOUNTER — HOME CARE VISIT (OUTPATIENT)
Dept: SCHEDULING | Facility: HOME HEALTH | Age: 84
End: 2019-08-30
Payer: MEDICARE

## 2019-08-30 PROCEDURE — 400014 HH F/U

## 2019-08-30 PROCEDURE — G0299 HHS/HOSPICE OF RN EA 15 MIN: HCPCS

## 2019-08-30 PROCEDURE — 3331090001 HH PPS REVENUE CREDIT

## 2019-08-30 PROCEDURE — 3331090002 HH PPS REVENUE DEBIT

## 2019-08-31 PROCEDURE — 3331090001 HH PPS REVENUE CREDIT

## 2019-08-31 PROCEDURE — 3331090002 HH PPS REVENUE DEBIT

## 2019-09-01 PROCEDURE — 3331090002 HH PPS REVENUE DEBIT

## 2019-09-01 PROCEDURE — 3331090001 HH PPS REVENUE CREDIT

## 2019-09-02 VITALS
RESPIRATION RATE: 18 BRPM | DIASTOLIC BLOOD PRESSURE: 74 MMHG | OXYGEN SATURATION: 98 % | TEMPERATURE: 97.8 F | HEART RATE: 79 BPM | SYSTOLIC BLOOD PRESSURE: 132 MMHG

## 2019-09-02 PROCEDURE — 3331090002 HH PPS REVENUE DEBIT

## 2019-09-02 PROCEDURE — 3331090001 HH PPS REVENUE CREDIT

## 2019-09-03 PROCEDURE — 3331090002 HH PPS REVENUE DEBIT

## 2019-09-03 PROCEDURE — 3331090001 HH PPS REVENUE CREDIT

## 2019-09-04 PROCEDURE — 3331090002 HH PPS REVENUE DEBIT

## 2019-09-04 PROCEDURE — 3331090001 HH PPS REVENUE CREDIT

## 2019-09-05 PROCEDURE — 3331090002 HH PPS REVENUE DEBIT

## 2019-09-05 PROCEDURE — 3331090001 HH PPS REVENUE CREDIT

## 2019-09-06 PROCEDURE — 3331090001 HH PPS REVENUE CREDIT

## 2019-09-06 PROCEDURE — 3331090002 HH PPS REVENUE DEBIT

## 2019-09-07 PROCEDURE — 3331090001 HH PPS REVENUE CREDIT

## 2019-09-07 PROCEDURE — 3331090002 HH PPS REVENUE DEBIT

## 2019-09-08 PROCEDURE — 3331090002 HH PPS REVENUE DEBIT

## 2019-09-08 PROCEDURE — 3331090001 HH PPS REVENUE CREDIT

## 2019-09-09 PROCEDURE — 3331090002 HH PPS REVENUE DEBIT

## 2019-09-09 PROCEDURE — 3331090001 HH PPS REVENUE CREDIT

## 2019-09-10 PROCEDURE — 3331090001 HH PPS REVENUE CREDIT

## 2019-09-10 PROCEDURE — 3331090002 HH PPS REVENUE DEBIT

## 2019-09-11 ENCOUNTER — HOME CARE VISIT (OUTPATIENT)
Dept: SCHEDULING | Facility: HOME HEALTH | Age: 84
End: 2019-09-11
Payer: MEDICARE

## 2019-09-11 VITALS
OXYGEN SATURATION: 94 % | HEART RATE: 71 BPM | RESPIRATION RATE: 18 BRPM | TEMPERATURE: 98 F | SYSTOLIC BLOOD PRESSURE: 112 MMHG | DIASTOLIC BLOOD PRESSURE: 62 MMHG

## 2019-09-11 PROCEDURE — G0299 HHS/HOSPICE OF RN EA 15 MIN: HCPCS

## 2019-09-11 PROCEDURE — 3331090001 HH PPS REVENUE CREDIT

## 2019-09-11 PROCEDURE — A4657 SYRINGE W/WO NEEDLE: HCPCS

## 2019-09-11 PROCEDURE — A4314 CATH W/DRAINAGE 2-WAY LATEX: HCPCS

## 2019-09-11 PROCEDURE — 3331090002 HH PPS REVENUE DEBIT

## 2019-09-12 PROCEDURE — 3331090001 HH PPS REVENUE CREDIT

## 2019-09-12 PROCEDURE — 3331090002 HH PPS REVENUE DEBIT

## 2019-09-13 PROCEDURE — 3331090001 HH PPS REVENUE CREDIT

## 2019-09-13 PROCEDURE — 3331090002 HH PPS REVENUE DEBIT

## 2019-09-14 PROCEDURE — 3331090001 HH PPS REVENUE CREDIT

## 2019-09-14 PROCEDURE — 3331090002 HH PPS REVENUE DEBIT

## 2019-09-15 PROCEDURE — 3331090002 HH PPS REVENUE DEBIT

## 2019-09-15 PROCEDURE — 3331090001 HH PPS REVENUE CREDIT

## 2019-09-16 PROCEDURE — 3331090001 HH PPS REVENUE CREDIT

## 2019-09-16 PROCEDURE — 3331090002 HH PPS REVENUE DEBIT

## 2019-09-17 PROCEDURE — 3331090001 HH PPS REVENUE CREDIT

## 2019-09-17 PROCEDURE — 3331090002 HH PPS REVENUE DEBIT

## 2019-09-18 PROCEDURE — 3331090001 HH PPS REVENUE CREDIT

## 2019-09-18 PROCEDURE — 3331090002 HH PPS REVENUE DEBIT

## 2019-09-19 PROCEDURE — 3331090001 HH PPS REVENUE CREDIT

## 2019-09-19 PROCEDURE — 3331090002 HH PPS REVENUE DEBIT

## 2019-09-20 PROCEDURE — 3331090001 HH PPS REVENUE CREDIT

## 2019-09-20 PROCEDURE — 3331090002 HH PPS REVENUE DEBIT

## 2019-09-21 PROCEDURE — 3331090002 HH PPS REVENUE DEBIT

## 2019-09-21 PROCEDURE — 3331090001 HH PPS REVENUE CREDIT

## 2019-09-22 PROCEDURE — 3331090002 HH PPS REVENUE DEBIT

## 2019-09-22 PROCEDURE — 3331090001 HH PPS REVENUE CREDIT

## 2019-09-23 PROCEDURE — 3331090002 HH PPS REVENUE DEBIT

## 2019-09-23 PROCEDURE — 3331090001 HH PPS REVENUE CREDIT

## 2019-09-24 PROCEDURE — 3331090001 HH PPS REVENUE CREDIT

## 2019-09-24 PROCEDURE — 3331090002 HH PPS REVENUE DEBIT

## 2019-09-25 PROCEDURE — 3331090001 HH PPS REVENUE CREDIT

## 2019-09-25 PROCEDURE — 3331090002 HH PPS REVENUE DEBIT

## 2019-09-26 PROCEDURE — 3331090001 HH PPS REVENUE CREDIT

## 2019-09-26 PROCEDURE — 3331090002 HH PPS REVENUE DEBIT

## 2019-09-27 PROCEDURE — 3331090002 HH PPS REVENUE DEBIT

## 2019-09-27 PROCEDURE — 3331090001 HH PPS REVENUE CREDIT

## 2019-09-28 PROCEDURE — 3331090001 HH PPS REVENUE CREDIT

## 2019-09-28 PROCEDURE — 3331090002 HH PPS REVENUE DEBIT

## 2019-09-29 PROCEDURE — 3331090002 HH PPS REVENUE DEBIT

## 2019-09-29 PROCEDURE — 3331090001 HH PPS REVENUE CREDIT

## 2019-09-30 PROCEDURE — 3331090001 HH PPS REVENUE CREDIT

## 2019-09-30 PROCEDURE — 3331090002 HH PPS REVENUE DEBIT

## 2019-10-01 PROCEDURE — 3331090001 HH PPS REVENUE CREDIT

## 2019-10-01 PROCEDURE — 3331090002 HH PPS REVENUE DEBIT

## 2019-10-02 ENCOUNTER — HOME CARE VISIT (OUTPATIENT)
Dept: SCHEDULING | Facility: HOME HEALTH | Age: 84
End: 2019-10-02
Payer: MEDICARE

## 2019-10-02 VITALS
DIASTOLIC BLOOD PRESSURE: 62 MMHG | OXYGEN SATURATION: 97 % | TEMPERATURE: 97.9 F | HEART RATE: 69 BPM | SYSTOLIC BLOOD PRESSURE: 110 MMHG

## 2019-10-02 PROCEDURE — 3331090002 HH PPS REVENUE DEBIT

## 2019-10-02 PROCEDURE — 3331090001 HH PPS REVENUE CREDIT

## 2019-10-02 PROCEDURE — G0299 HHS/HOSPICE OF RN EA 15 MIN: HCPCS

## 2019-10-03 PROCEDURE — A4657 SYRINGE W/WO NEEDLE: HCPCS

## 2019-10-03 PROCEDURE — 3331090002 HH PPS REVENUE DEBIT

## 2019-10-03 PROCEDURE — 3331090001 HH PPS REVENUE CREDIT

## 2019-10-03 PROCEDURE — A4314 CATH W/DRAINAGE 2-WAY LATEX: HCPCS

## 2019-10-04 PROCEDURE — 3331090001 HH PPS REVENUE CREDIT

## 2019-10-04 PROCEDURE — 3331090002 HH PPS REVENUE DEBIT

## 2019-10-05 PROCEDURE — 3331090002 HH PPS REVENUE DEBIT

## 2019-10-05 PROCEDURE — 3331090001 HH PPS REVENUE CREDIT

## 2019-10-06 PROCEDURE — 3331090002 HH PPS REVENUE DEBIT

## 2019-10-06 PROCEDURE — 3331090001 HH PPS REVENUE CREDIT

## 2019-10-07 PROCEDURE — 3331090001 HH PPS REVENUE CREDIT

## 2019-10-07 PROCEDURE — 3331090002 HH PPS REVENUE DEBIT

## 2019-10-08 PROCEDURE — 3331090002 HH PPS REVENUE DEBIT

## 2019-10-08 PROCEDURE — 3331090001 HH PPS REVENUE CREDIT

## 2019-10-09 PROCEDURE — 3331090002 HH PPS REVENUE DEBIT

## 2019-10-09 PROCEDURE — 3331090001 HH PPS REVENUE CREDIT

## 2019-10-10 PROCEDURE — 3331090001 HH PPS REVENUE CREDIT

## 2019-10-10 PROCEDURE — 3331090002 HH PPS REVENUE DEBIT

## 2019-10-11 PROCEDURE — 3331090002 HH PPS REVENUE DEBIT

## 2019-10-11 PROCEDURE — 3331090001 HH PPS REVENUE CREDIT

## 2019-10-12 PROCEDURE — 3331090002 HH PPS REVENUE DEBIT

## 2019-10-12 PROCEDURE — 3331090001 HH PPS REVENUE CREDIT

## 2019-10-13 PROCEDURE — 3331090002 HH PPS REVENUE DEBIT

## 2019-10-13 PROCEDURE — 3331090001 HH PPS REVENUE CREDIT

## 2019-10-14 PROCEDURE — 3331090001 HH PPS REVENUE CREDIT

## 2019-10-14 PROCEDURE — 3331090002 HH PPS REVENUE DEBIT

## 2019-10-15 PROCEDURE — 3331090002 HH PPS REVENUE DEBIT

## 2019-10-15 PROCEDURE — 3331090001 HH PPS REVENUE CREDIT

## 2019-10-16 PROCEDURE — 3331090002 HH PPS REVENUE DEBIT

## 2019-10-16 PROCEDURE — 3331090001 HH PPS REVENUE CREDIT

## 2019-10-17 PROCEDURE — 3331090002 HH PPS REVENUE DEBIT

## 2019-10-17 PROCEDURE — 3331090001 HH PPS REVENUE CREDIT

## 2019-10-18 PROCEDURE — 3331090002 HH PPS REVENUE DEBIT

## 2019-10-18 PROCEDURE — 3331090001 HH PPS REVENUE CREDIT

## 2019-10-19 PROCEDURE — 3331090001 HH PPS REVENUE CREDIT

## 2019-10-19 PROCEDURE — 3331090002 HH PPS REVENUE DEBIT

## 2019-10-20 PROCEDURE — 3331090002 HH PPS REVENUE DEBIT

## 2019-10-20 PROCEDURE — 3331090001 HH PPS REVENUE CREDIT

## 2019-10-21 PROCEDURE — 3331090002 HH PPS REVENUE DEBIT

## 2019-10-21 PROCEDURE — 3331090001 HH PPS REVENUE CREDIT

## 2019-10-22 ENCOUNTER — HOME CARE VISIT (OUTPATIENT)
Dept: SCHEDULING | Facility: HOME HEALTH | Age: 84
End: 2019-10-22
Payer: MEDICARE

## 2019-10-22 VITALS
HEART RATE: 95 BPM | OXYGEN SATURATION: 95 % | RESPIRATION RATE: 19 BRPM | TEMPERATURE: 98.1 F | DIASTOLIC BLOOD PRESSURE: 68 MMHG | SYSTOLIC BLOOD PRESSURE: 120 MMHG

## 2019-10-22 PROCEDURE — 3331090002 HH PPS REVENUE DEBIT

## 2019-10-22 PROCEDURE — A4657 SYRINGE W/WO NEEDLE: HCPCS

## 2019-10-22 PROCEDURE — G0299 HHS/HOSPICE OF RN EA 15 MIN: HCPCS

## 2019-10-22 PROCEDURE — 3331090001 HH PPS REVENUE CREDIT

## 2019-10-22 PROCEDURE — A4314 CATH W/DRAINAGE 2-WAY LATEX: HCPCS

## 2019-10-23 PROCEDURE — 3331090002 HH PPS REVENUE DEBIT

## 2019-10-23 PROCEDURE — 3331090001 HH PPS REVENUE CREDIT

## 2019-10-24 PROCEDURE — 3331090001 HH PPS REVENUE CREDIT

## 2019-10-24 PROCEDURE — 3331090002 HH PPS REVENUE DEBIT

## 2019-10-25 PROCEDURE — 3331090002 HH PPS REVENUE DEBIT

## 2019-10-25 PROCEDURE — 3331090001 HH PPS REVENUE CREDIT

## 2019-10-26 PROCEDURE — 3331090003 HH PPS REVENUE ADJ

## 2019-10-26 PROCEDURE — 3331090002 HH PPS REVENUE DEBIT

## 2019-10-26 PROCEDURE — 3331090001 HH PPS REVENUE CREDIT

## 2019-10-27 PROCEDURE — 400014 HH F/U

## 2019-10-27 PROCEDURE — 3331090001 HH PPS REVENUE CREDIT

## 2019-10-27 PROCEDURE — 3331090002 HH PPS REVENUE DEBIT

## 2019-10-28 PROCEDURE — 3331090002 HH PPS REVENUE DEBIT

## 2019-10-28 PROCEDURE — 3331090001 HH PPS REVENUE CREDIT

## 2019-10-29 PROCEDURE — 3331090002 HH PPS REVENUE DEBIT

## 2019-10-29 PROCEDURE — 3331090001 HH PPS REVENUE CREDIT

## 2019-10-30 PROCEDURE — 3331090002 HH PPS REVENUE DEBIT

## 2019-10-30 PROCEDURE — 3331090001 HH PPS REVENUE CREDIT

## 2019-10-31 PROCEDURE — 3331090001 HH PPS REVENUE CREDIT

## 2019-10-31 PROCEDURE — 3331090002 HH PPS REVENUE DEBIT

## 2019-11-01 PROCEDURE — 3331090002 HH PPS REVENUE DEBIT

## 2019-11-01 PROCEDURE — 3331090001 HH PPS REVENUE CREDIT

## 2019-11-02 PROCEDURE — 3331090001 HH PPS REVENUE CREDIT

## 2019-11-02 PROCEDURE — 3331090002 HH PPS REVENUE DEBIT

## 2019-11-03 PROCEDURE — 3331090001 HH PPS REVENUE CREDIT

## 2019-11-03 PROCEDURE — 3331090002 HH PPS REVENUE DEBIT

## 2019-11-04 PROCEDURE — 3331090002 HH PPS REVENUE DEBIT

## 2019-11-04 PROCEDURE — 3331090001 HH PPS REVENUE CREDIT

## 2019-11-05 PROCEDURE — 3331090001 HH PPS REVENUE CREDIT

## 2019-11-05 PROCEDURE — 3331090002 HH PPS REVENUE DEBIT

## 2019-11-06 PROCEDURE — 3331090002 HH PPS REVENUE DEBIT

## 2019-11-06 PROCEDURE — 3331090001 HH PPS REVENUE CREDIT

## 2019-11-07 PROCEDURE — 3331090002 HH PPS REVENUE DEBIT

## 2019-11-07 PROCEDURE — 3331090001 HH PPS REVENUE CREDIT

## 2019-11-08 PROCEDURE — 3331090001 HH PPS REVENUE CREDIT

## 2019-11-08 PROCEDURE — 3331090002 HH PPS REVENUE DEBIT

## 2019-11-09 PROCEDURE — 3331090002 HH PPS REVENUE DEBIT

## 2019-11-09 PROCEDURE — 3331090001 HH PPS REVENUE CREDIT

## 2019-11-10 PROCEDURE — 3331090002 HH PPS REVENUE DEBIT

## 2019-11-10 PROCEDURE — 3331090001 HH PPS REVENUE CREDIT

## 2019-11-11 PROCEDURE — 3331090002 HH PPS REVENUE DEBIT

## 2019-11-11 PROCEDURE — 3331090001 HH PPS REVENUE CREDIT

## 2019-11-12 ENCOUNTER — HOME CARE VISIT (OUTPATIENT)
Dept: SCHEDULING | Facility: HOME HEALTH | Age: 84
End: 2019-11-12
Payer: MEDICARE

## 2019-11-12 VITALS
RESPIRATION RATE: 16 BRPM | HEART RATE: 74 BPM | OXYGEN SATURATION: 96 % | TEMPERATURE: 98.1 F | DIASTOLIC BLOOD PRESSURE: 76 MMHG | SYSTOLIC BLOOD PRESSURE: 130 MMHG

## 2019-11-12 PROCEDURE — 3331090001 HH PPS REVENUE CREDIT

## 2019-11-12 PROCEDURE — 400014 HH F/U

## 2019-11-12 PROCEDURE — 3331090002 HH PPS REVENUE DEBIT

## 2019-11-12 PROCEDURE — G0299 HHS/HOSPICE OF RN EA 15 MIN: HCPCS

## 2019-11-13 PROCEDURE — A4314 CATH W/DRAINAGE 2-WAY LATEX: HCPCS

## 2019-11-13 PROCEDURE — 3331090001 HH PPS REVENUE CREDIT

## 2019-11-13 PROCEDURE — 3331090002 HH PPS REVENUE DEBIT

## 2019-11-13 PROCEDURE — A4657 SYRINGE W/WO NEEDLE: HCPCS

## 2019-11-14 PROCEDURE — 3331090002 HH PPS REVENUE DEBIT

## 2019-11-14 PROCEDURE — 3331090001 HH PPS REVENUE CREDIT

## 2019-11-15 ENCOUNTER — HOME CARE VISIT (OUTPATIENT)
Dept: SCHEDULING | Facility: HOME HEALTH | Age: 84
End: 2019-11-15
Payer: MEDICARE

## 2019-11-15 VITALS — TEMPERATURE: 98.4 F | HEART RATE: 84 BPM | OXYGEN SATURATION: 97 %

## 2019-11-15 PROCEDURE — G0299 HHS/HOSPICE OF RN EA 15 MIN: HCPCS

## 2019-11-15 PROCEDURE — 3331090001 HH PPS REVENUE CREDIT

## 2019-11-15 PROCEDURE — 3331090002 HH PPS REVENUE DEBIT

## 2019-11-16 PROCEDURE — 3331090002 HH PPS REVENUE DEBIT

## 2019-11-16 PROCEDURE — 3331090001 HH PPS REVENUE CREDIT

## 2019-11-17 PROCEDURE — 3331090001 HH PPS REVENUE CREDIT

## 2019-11-17 PROCEDURE — 3331090002 HH PPS REVENUE DEBIT

## 2019-11-18 PROCEDURE — 3331090002 HH PPS REVENUE DEBIT

## 2019-11-18 PROCEDURE — 3331090001 HH PPS REVENUE CREDIT

## 2019-11-19 PROCEDURE — 3331090002 HH PPS REVENUE DEBIT

## 2019-11-19 PROCEDURE — 3331090001 HH PPS REVENUE CREDIT

## 2019-11-20 PROCEDURE — 3331090001 HH PPS REVENUE CREDIT

## 2019-11-20 PROCEDURE — 3331090002 HH PPS REVENUE DEBIT

## 2019-11-21 PROCEDURE — 3331090001 HH PPS REVENUE CREDIT

## 2019-11-21 PROCEDURE — 3331090002 HH PPS REVENUE DEBIT

## 2019-11-22 ENCOUNTER — HOME CARE VISIT (OUTPATIENT)
Dept: HOME HEALTH SERVICES | Facility: HOME HEALTH | Age: 84
End: 2019-11-22
Payer: MEDICARE

## 2019-11-22 PROCEDURE — 3331090001 HH PPS REVENUE CREDIT

## 2019-11-22 PROCEDURE — G0299 HHS/HOSPICE OF RN EA 15 MIN: HCPCS

## 2019-11-22 PROCEDURE — 3331090002 HH PPS REVENUE DEBIT

## 2019-11-23 PROCEDURE — 3331090002 HH PPS REVENUE DEBIT

## 2019-11-23 PROCEDURE — 3331090001 HH PPS REVENUE CREDIT

## 2019-11-24 VITALS
RESPIRATION RATE: 11 BRPM | OXYGEN SATURATION: 98 % | HEART RATE: 82 BPM | DIASTOLIC BLOOD PRESSURE: 64 MMHG | SYSTOLIC BLOOD PRESSURE: 138 MMHG | TEMPERATURE: 97.4 F

## 2019-11-24 PROCEDURE — 3331090001 HH PPS REVENUE CREDIT

## 2019-11-24 PROCEDURE — 3331090002 HH PPS REVENUE DEBIT

## 2019-11-25 PROCEDURE — 3331090002 HH PPS REVENUE DEBIT

## 2019-11-25 PROCEDURE — 3331090001 HH PPS REVENUE CREDIT

## 2019-11-26 PROCEDURE — 3331090002 HH PPS REVENUE DEBIT

## 2019-11-26 PROCEDURE — 3331090001 HH PPS REVENUE CREDIT

## 2019-11-27 PROCEDURE — 3331090002 HH PPS REVENUE DEBIT

## 2019-11-27 PROCEDURE — 3331090001 HH PPS REVENUE CREDIT

## 2019-11-28 PROCEDURE — 3331090001 HH PPS REVENUE CREDIT

## 2019-11-28 PROCEDURE — 3331090002 HH PPS REVENUE DEBIT

## 2019-11-29 PROCEDURE — 3331090002 HH PPS REVENUE DEBIT

## 2019-11-29 PROCEDURE — 3331090001 HH PPS REVENUE CREDIT

## 2019-11-30 PROCEDURE — 3331090002 HH PPS REVENUE DEBIT

## 2019-11-30 PROCEDURE — 3331090001 HH PPS REVENUE CREDIT

## 2019-12-01 PROCEDURE — 3331090001 HH PPS REVENUE CREDIT

## 2019-12-01 PROCEDURE — 3331090002 HH PPS REVENUE DEBIT

## 2019-12-02 PROCEDURE — 3331090001 HH PPS REVENUE CREDIT

## 2019-12-02 PROCEDURE — 3331090002 HH PPS REVENUE DEBIT

## 2019-12-03 PROCEDURE — 3331090002 HH PPS REVENUE DEBIT

## 2019-12-03 PROCEDURE — 3331090001 HH PPS REVENUE CREDIT

## 2019-12-04 ENCOUNTER — HOME CARE VISIT (OUTPATIENT)
Dept: SCHEDULING | Facility: HOME HEALTH | Age: 84
End: 2019-12-04
Payer: MEDICARE

## 2019-12-04 VITALS
OXYGEN SATURATION: 98 % | RESPIRATION RATE: 18 BRPM | DIASTOLIC BLOOD PRESSURE: 80 MMHG | TEMPERATURE: 98.2 F | HEART RATE: 80 BPM | SYSTOLIC BLOOD PRESSURE: 136 MMHG

## 2019-12-04 PROCEDURE — 3331090001 HH PPS REVENUE CREDIT

## 2019-12-04 PROCEDURE — G0299 HHS/HOSPICE OF RN EA 15 MIN: HCPCS

## 2019-12-04 PROCEDURE — 3331090002 HH PPS REVENUE DEBIT

## 2019-12-05 PROCEDURE — 3331090001 HH PPS REVENUE CREDIT

## 2019-12-05 PROCEDURE — 3331090002 HH PPS REVENUE DEBIT

## 2019-12-06 PROCEDURE — 3331090002 HH PPS REVENUE DEBIT

## 2019-12-06 PROCEDURE — 3331090001 HH PPS REVENUE CREDIT

## 2019-12-07 PROCEDURE — 3331090001 HH PPS REVENUE CREDIT

## 2019-12-07 PROCEDURE — 3331090002 HH PPS REVENUE DEBIT

## 2019-12-08 PROCEDURE — 3331090002 HH PPS REVENUE DEBIT

## 2019-12-08 PROCEDURE — 3331090001 HH PPS REVENUE CREDIT

## 2019-12-09 PROCEDURE — 3331090002 HH PPS REVENUE DEBIT

## 2019-12-09 PROCEDURE — 3331090001 HH PPS REVENUE CREDIT

## 2019-12-10 PROCEDURE — 3331090001 HH PPS REVENUE CREDIT

## 2019-12-10 PROCEDURE — 3331090002 HH PPS REVENUE DEBIT

## 2019-12-11 PROCEDURE — 3331090002 HH PPS REVENUE DEBIT

## 2019-12-11 PROCEDURE — 3331090001 HH PPS REVENUE CREDIT

## 2019-12-12 PROCEDURE — 3331090002 HH PPS REVENUE DEBIT

## 2019-12-12 PROCEDURE — 3331090001 HH PPS REVENUE CREDIT

## 2019-12-13 PROCEDURE — 3331090001 HH PPS REVENUE CREDIT

## 2019-12-13 PROCEDURE — 3331090002 HH PPS REVENUE DEBIT

## 2019-12-14 PROCEDURE — 3331090002 HH PPS REVENUE DEBIT

## 2019-12-14 PROCEDURE — 3331090001 HH PPS REVENUE CREDIT

## 2019-12-15 PROCEDURE — 3331090001 HH PPS REVENUE CREDIT

## 2019-12-15 PROCEDURE — 3331090002 HH PPS REVENUE DEBIT

## 2019-12-16 PROCEDURE — 3331090001 HH PPS REVENUE CREDIT

## 2019-12-16 PROCEDURE — 3331090002 HH PPS REVENUE DEBIT

## 2019-12-17 PROCEDURE — 3331090001 HH PPS REVENUE CREDIT

## 2019-12-17 PROCEDURE — 3331090002 HH PPS REVENUE DEBIT

## 2019-12-18 PROCEDURE — 3331090002 HH PPS REVENUE DEBIT

## 2019-12-18 PROCEDURE — 3331090001 HH PPS REVENUE CREDIT

## 2019-12-19 PROCEDURE — 3331090001 HH PPS REVENUE CREDIT

## 2019-12-19 PROCEDURE — 3331090002 HH PPS REVENUE DEBIT

## 2019-12-20 PROCEDURE — 3331090002 HH PPS REVENUE DEBIT

## 2019-12-20 PROCEDURE — 3331090001 HH PPS REVENUE CREDIT

## 2019-12-21 PROCEDURE — 3331090002 HH PPS REVENUE DEBIT

## 2019-12-21 PROCEDURE — 3331090001 HH PPS REVENUE CREDIT

## 2019-12-22 PROCEDURE — 3331090002 HH PPS REVENUE DEBIT

## 2019-12-22 PROCEDURE — 3331090001 HH PPS REVENUE CREDIT

## 2019-12-23 PROCEDURE — 3331090002 HH PPS REVENUE DEBIT

## 2019-12-23 PROCEDURE — 3331090001 HH PPS REVENUE CREDIT

## 2019-12-24 ENCOUNTER — HOME CARE VISIT (OUTPATIENT)
Dept: SCHEDULING | Facility: HOME HEALTH | Age: 84
End: 2019-12-24
Payer: MEDICARE

## 2019-12-24 VITALS
HEART RATE: 80 BPM | RESPIRATION RATE: 18 BRPM | OXYGEN SATURATION: 96 % | TEMPERATURE: 98.3 F | SYSTOLIC BLOOD PRESSURE: 134 MMHG | DIASTOLIC BLOOD PRESSURE: 78 MMHG

## 2019-12-24 PROCEDURE — 3331090001 HH PPS REVENUE CREDIT

## 2019-12-24 PROCEDURE — A4657 SYRINGE W/WO NEEDLE: HCPCS

## 2019-12-24 PROCEDURE — 3331090002 HH PPS REVENUE DEBIT

## 2019-12-24 PROCEDURE — G0299 HHS/HOSPICE OF RN EA 15 MIN: HCPCS

## 2019-12-24 PROCEDURE — A4314 CATH W/DRAINAGE 2-WAY LATEX: HCPCS

## 2019-12-25 PROCEDURE — 3331090001 HH PPS REVENUE CREDIT

## 2019-12-25 PROCEDURE — 3331090002 HH PPS REVENUE DEBIT

## 2019-12-26 PROCEDURE — 3331090002 HH PPS REVENUE DEBIT

## 2019-12-26 PROCEDURE — 3331090001 HH PPS REVENUE CREDIT

## 2019-12-27 PROCEDURE — 3331090002 HH PPS REVENUE DEBIT

## 2019-12-27 PROCEDURE — 3331090001 HH PPS REVENUE CREDIT

## 2019-12-28 PROCEDURE — 3331090001 HH PPS REVENUE CREDIT

## 2019-12-28 PROCEDURE — 3331090002 HH PPS REVENUE DEBIT

## 2019-12-29 PROCEDURE — 3331090002 HH PPS REVENUE DEBIT

## 2019-12-29 PROCEDURE — 3331090001 HH PPS REVENUE CREDIT

## 2019-12-30 PROCEDURE — 3331090001 HH PPS REVENUE CREDIT

## 2019-12-30 PROCEDURE — 3331090002 HH PPS REVENUE DEBIT

## 2019-12-31 PROCEDURE — 3331090001 HH PPS REVENUE CREDIT

## 2019-12-31 PROCEDURE — 3331090002 HH PPS REVENUE DEBIT

## 2020-01-01 PROCEDURE — 3331090001 HH PPS REVENUE CREDIT

## 2020-01-01 PROCEDURE — 3331090002 HH PPS REVENUE DEBIT

## 2020-01-02 PROCEDURE — 3331090001 HH PPS REVENUE CREDIT

## 2020-01-02 PROCEDURE — 3331090002 HH PPS REVENUE DEBIT

## 2020-01-03 PROCEDURE — 3331090002 HH PPS REVENUE DEBIT

## 2020-01-03 PROCEDURE — 3331090001 HH PPS REVENUE CREDIT

## 2020-01-04 PROCEDURE — 3331090002 HH PPS REVENUE DEBIT

## 2020-01-04 PROCEDURE — 3331090001 HH PPS REVENUE CREDIT

## 2020-01-05 PROCEDURE — 3331090002 HH PPS REVENUE DEBIT

## 2020-01-05 PROCEDURE — 3331090001 HH PPS REVENUE CREDIT

## 2020-01-06 PROCEDURE — 3331090001 HH PPS REVENUE CREDIT

## 2020-01-06 PROCEDURE — 3331090002 HH PPS REVENUE DEBIT

## 2020-01-07 PROCEDURE — 3331090002 HH PPS REVENUE DEBIT

## 2020-01-07 PROCEDURE — 3331090001 HH PPS REVENUE CREDIT

## 2020-01-08 PROCEDURE — 3331090001 HH PPS REVENUE CREDIT

## 2020-01-08 PROCEDURE — 3331090002 HH PPS REVENUE DEBIT

## 2020-01-09 PROCEDURE — 3331090001 HH PPS REVENUE CREDIT

## 2020-01-09 PROCEDURE — 3331090002 HH PPS REVENUE DEBIT

## 2020-01-10 PROCEDURE — 3331090001 HH PPS REVENUE CREDIT

## 2020-01-10 PROCEDURE — 3331090002 HH PPS REVENUE DEBIT

## 2020-01-11 PROCEDURE — 3331090002 HH PPS REVENUE DEBIT

## 2020-01-11 PROCEDURE — 3331090001 HH PPS REVENUE CREDIT

## 2020-01-12 PROCEDURE — 3331090002 HH PPS REVENUE DEBIT

## 2020-01-12 PROCEDURE — 3331090001 HH PPS REVENUE CREDIT

## 2020-01-13 PROCEDURE — 3331090001 HH PPS REVENUE CREDIT

## 2020-01-13 PROCEDURE — 3331090002 HH PPS REVENUE DEBIT

## 2020-01-14 ENCOUNTER — HOME CARE VISIT (OUTPATIENT)
Dept: SCHEDULING | Facility: HOME HEALTH | Age: 85
End: 2020-01-14
Payer: MEDICARE

## 2020-01-14 PROCEDURE — 400014 HH F/U

## 2020-01-14 PROCEDURE — 3331090002 HH PPS REVENUE DEBIT

## 2020-01-14 PROCEDURE — G0299 HHS/HOSPICE OF RN EA 15 MIN: HCPCS

## 2020-01-14 PROCEDURE — 3331090001 HH PPS REVENUE CREDIT

## 2020-01-15 VITALS
RESPIRATION RATE: 18 BRPM | DIASTOLIC BLOOD PRESSURE: 78 MMHG | SYSTOLIC BLOOD PRESSURE: 124 MMHG | TEMPERATURE: 98.2 F | HEART RATE: 82 BPM | OXYGEN SATURATION: 99 %

## 2020-01-15 PROCEDURE — 3331090002 HH PPS REVENUE DEBIT

## 2020-01-15 PROCEDURE — 3331090001 HH PPS REVENUE CREDIT

## 2020-01-16 PROCEDURE — 3331090001 HH PPS REVENUE CREDIT

## 2020-01-16 PROCEDURE — 3331090002 HH PPS REVENUE DEBIT

## 2020-01-17 PROCEDURE — 3331090002 HH PPS REVENUE DEBIT

## 2020-01-17 PROCEDURE — 3331090001 HH PPS REVENUE CREDIT

## 2020-01-18 PROCEDURE — 3331090002 HH PPS REVENUE DEBIT

## 2020-01-18 PROCEDURE — 3331090001 HH PPS REVENUE CREDIT

## 2020-01-19 PROCEDURE — 3331090002 HH PPS REVENUE DEBIT

## 2020-01-19 PROCEDURE — 3331090001 HH PPS REVENUE CREDIT

## 2020-01-20 PROCEDURE — 3331090002 HH PPS REVENUE DEBIT

## 2020-01-20 PROCEDURE — 3331090001 HH PPS REVENUE CREDIT

## 2020-01-21 PROCEDURE — 3331090002 HH PPS REVENUE DEBIT

## 2020-01-21 PROCEDURE — 3331090001 HH PPS REVENUE CREDIT

## 2020-01-22 PROCEDURE — 3331090001 HH PPS REVENUE CREDIT

## 2020-01-22 PROCEDURE — 3331090002 HH PPS REVENUE DEBIT

## 2020-01-23 PROCEDURE — 3331090001 HH PPS REVENUE CREDIT

## 2020-01-23 PROCEDURE — 3331090002 HH PPS REVENUE DEBIT

## 2020-01-24 PROCEDURE — 3331090002 HH PPS REVENUE DEBIT

## 2020-01-24 PROCEDURE — 3331090001 HH PPS REVENUE CREDIT

## 2020-01-25 PROCEDURE — 3331090001 HH PPS REVENUE CREDIT

## 2020-01-25 PROCEDURE — 3331090002 HH PPS REVENUE DEBIT

## 2020-01-26 PROCEDURE — 3331090001 HH PPS REVENUE CREDIT

## 2020-01-26 PROCEDURE — 3331090002 HH PPS REVENUE DEBIT

## 2020-01-27 PROCEDURE — 3331090001 HH PPS REVENUE CREDIT

## 2020-01-27 PROCEDURE — 3331090002 HH PPS REVENUE DEBIT

## 2020-01-28 PROCEDURE — 3331090001 HH PPS REVENUE CREDIT

## 2020-01-28 PROCEDURE — 3331090002 HH PPS REVENUE DEBIT

## 2020-01-29 PROCEDURE — 3331090002 HH PPS REVENUE DEBIT

## 2020-01-29 PROCEDURE — 3331090001 HH PPS REVENUE CREDIT

## 2020-01-30 PROCEDURE — 3331090002 HH PPS REVENUE DEBIT

## 2020-01-30 PROCEDURE — 3331090001 HH PPS REVENUE CREDIT

## 2020-01-31 PROCEDURE — 3331090002 HH PPS REVENUE DEBIT

## 2020-01-31 PROCEDURE — 3331090001 HH PPS REVENUE CREDIT

## 2020-02-01 PROCEDURE — 3331090001 HH PPS REVENUE CREDIT

## 2020-02-01 PROCEDURE — 3331090002 HH PPS REVENUE DEBIT

## 2020-02-02 PROCEDURE — 3331090002 HH PPS REVENUE DEBIT

## 2020-02-02 PROCEDURE — 3331090001 HH PPS REVENUE CREDIT

## 2020-02-03 PROCEDURE — 3331090002 HH PPS REVENUE DEBIT

## 2020-02-03 PROCEDURE — 3331090001 HH PPS REVENUE CREDIT

## 2020-02-04 ENCOUNTER — HOME CARE VISIT (OUTPATIENT)
Dept: SCHEDULING | Facility: HOME HEALTH | Age: 85
End: 2020-02-04
Payer: MEDICARE

## 2020-02-04 VITALS
TEMPERATURE: 97.2 F | HEART RATE: 80 BPM | RESPIRATION RATE: 20 BRPM | DIASTOLIC BLOOD PRESSURE: 82 MMHG | SYSTOLIC BLOOD PRESSURE: 122 MMHG | OXYGEN SATURATION: 98 %

## 2020-02-04 PROCEDURE — 3331090002 HH PPS REVENUE DEBIT

## 2020-02-04 PROCEDURE — 3331090001 HH PPS REVENUE CREDIT

## 2020-02-04 PROCEDURE — G0299 HHS/HOSPICE OF RN EA 15 MIN: HCPCS

## 2020-02-05 ENCOUNTER — HOME CARE VISIT (OUTPATIENT)
Dept: HOME HEALTH SERVICES | Facility: HOME HEALTH | Age: 85
End: 2020-02-05
Payer: MEDICARE

## 2020-02-05 PROCEDURE — 3331090001 HH PPS REVENUE CREDIT

## 2020-02-05 PROCEDURE — 3331090003 HH PPS REVENUE ADJ

## 2020-02-05 PROCEDURE — 3331090002 HH PPS REVENUE DEBIT

## 2020-02-06 ENCOUNTER — HOME CARE VISIT (OUTPATIENT)
Dept: SCHEDULING | Facility: HOME HEALTH | Age: 85
End: 2020-02-06
Payer: MEDICARE

## 2020-02-06 ENCOUNTER — HOME CARE VISIT (OUTPATIENT)
Dept: HOME HEALTH SERVICES | Facility: HOME HEALTH | Age: 85
End: 2020-02-06
Payer: MEDICARE

## 2020-02-06 PROCEDURE — 3331090001 HH PPS REVENUE CREDIT

## 2020-02-06 PROCEDURE — 400013 HH SOC

## 2020-02-06 PROCEDURE — 3331090002 HH PPS REVENUE DEBIT

## 2020-02-06 PROCEDURE — G0299 HHS/HOSPICE OF RN EA 15 MIN: HCPCS

## 2020-02-07 VITALS
TEMPERATURE: 98.2 F | HEART RATE: 82 BPM | SYSTOLIC BLOOD PRESSURE: 128 MMHG | RESPIRATION RATE: 18 BRPM | DIASTOLIC BLOOD PRESSURE: 78 MMHG | OXYGEN SATURATION: 98 %

## 2020-02-07 PROCEDURE — 3331090001 HH PPS REVENUE CREDIT

## 2020-02-07 PROCEDURE — 3331090002 HH PPS REVENUE DEBIT

## 2020-02-08 PROCEDURE — 3331090002 HH PPS REVENUE DEBIT

## 2020-02-08 PROCEDURE — 3331090001 HH PPS REVENUE CREDIT

## 2020-02-09 PROCEDURE — 3331090002 HH PPS REVENUE DEBIT

## 2020-02-09 PROCEDURE — 3331090001 HH PPS REVENUE CREDIT

## 2020-02-10 PROCEDURE — 3331090002 HH PPS REVENUE DEBIT

## 2020-02-10 PROCEDURE — 3331090001 HH PPS REVENUE CREDIT

## 2020-02-11 PROCEDURE — A4314 CATH W/DRAINAGE 2-WAY LATEX: HCPCS

## 2020-02-11 PROCEDURE — 3331090001 HH PPS REVENUE CREDIT

## 2020-02-11 PROCEDURE — A4657 SYRINGE W/WO NEEDLE: HCPCS

## 2020-02-11 PROCEDURE — 3331090002 HH PPS REVENUE DEBIT

## 2020-02-12 PROCEDURE — 3331090001 HH PPS REVENUE CREDIT

## 2020-02-12 PROCEDURE — 3331090002 HH PPS REVENUE DEBIT

## 2020-02-13 PROCEDURE — 3331090001 HH PPS REVENUE CREDIT

## 2020-02-13 PROCEDURE — 3331090002 HH PPS REVENUE DEBIT

## 2020-02-14 PROCEDURE — 3331090001 HH PPS REVENUE CREDIT

## 2020-02-14 PROCEDURE — 3331090002 HH PPS REVENUE DEBIT

## 2020-02-15 PROCEDURE — 3331090002 HH PPS REVENUE DEBIT

## 2020-02-15 PROCEDURE — 3331090001 HH PPS REVENUE CREDIT

## 2020-02-16 PROCEDURE — 3331090002 HH PPS REVENUE DEBIT

## 2020-02-16 PROCEDURE — 3331090001 HH PPS REVENUE CREDIT

## 2020-02-17 PROCEDURE — 3331090001 HH PPS REVENUE CREDIT

## 2020-02-17 PROCEDURE — 3331090002 HH PPS REVENUE DEBIT

## 2020-02-18 PROCEDURE — 3331090002 HH PPS REVENUE DEBIT

## 2020-02-18 PROCEDURE — 3331090001 HH PPS REVENUE CREDIT

## 2020-02-19 PROCEDURE — 3331090001 HH PPS REVENUE CREDIT

## 2020-02-19 PROCEDURE — 3331090002 HH PPS REVENUE DEBIT

## 2020-02-20 PROCEDURE — 3331090002 HH PPS REVENUE DEBIT

## 2020-02-20 PROCEDURE — 3331090001 HH PPS REVENUE CREDIT

## 2020-02-21 PROCEDURE — 3331090001 HH PPS REVENUE CREDIT

## 2020-02-21 PROCEDURE — 3331090002 HH PPS REVENUE DEBIT

## 2020-02-22 PROCEDURE — 3331090001 HH PPS REVENUE CREDIT

## 2020-02-22 PROCEDURE — 3331090002 HH PPS REVENUE DEBIT

## 2020-02-23 PROCEDURE — 3331090002 HH PPS REVENUE DEBIT

## 2020-02-23 PROCEDURE — 3331090001 HH PPS REVENUE CREDIT

## 2020-02-24 PROCEDURE — 3331090002 HH PPS REVENUE DEBIT

## 2020-02-24 PROCEDURE — 3331090001 HH PPS REVENUE CREDIT

## 2020-02-25 ENCOUNTER — HOME CARE VISIT (OUTPATIENT)
Dept: SCHEDULING | Facility: HOME HEALTH | Age: 85
End: 2020-02-25
Payer: MEDICARE

## 2020-02-25 VITALS
DIASTOLIC BLOOD PRESSURE: 76 MMHG | RESPIRATION RATE: 18 BRPM | SYSTOLIC BLOOD PRESSURE: 132 MMHG | HEART RATE: 80 BPM | OXYGEN SATURATION: 98 % | TEMPERATURE: 97.7 F

## 2020-02-25 PROCEDURE — 3331090001 HH PPS REVENUE CREDIT

## 2020-02-25 PROCEDURE — 3331090002 HH PPS REVENUE DEBIT

## 2020-02-25 PROCEDURE — G0299 HHS/HOSPICE OF RN EA 15 MIN: HCPCS

## 2020-02-26 PROCEDURE — 3331090001 HH PPS REVENUE CREDIT

## 2020-02-26 PROCEDURE — 3331090002 HH PPS REVENUE DEBIT

## 2020-02-27 PROCEDURE — 3331090001 HH PPS REVENUE CREDIT

## 2020-02-27 PROCEDURE — 3331090002 HH PPS REVENUE DEBIT

## 2020-02-28 PROCEDURE — 3331090002 HH PPS REVENUE DEBIT

## 2020-02-28 PROCEDURE — 3331090001 HH PPS REVENUE CREDIT

## 2020-02-29 PROCEDURE — 3331090002 HH PPS REVENUE DEBIT

## 2020-02-29 PROCEDURE — 3331090001 HH PPS REVENUE CREDIT

## 2020-03-01 PROCEDURE — 3331090002 HH PPS REVENUE DEBIT

## 2020-03-01 PROCEDURE — 3331090001 HH PPS REVENUE CREDIT

## 2020-03-02 PROCEDURE — 3331090002 HH PPS REVENUE DEBIT

## 2020-03-02 PROCEDURE — 3331090001 HH PPS REVENUE CREDIT

## 2020-03-03 PROCEDURE — 3331090002 HH PPS REVENUE DEBIT

## 2020-03-03 PROCEDURE — 3331090001 HH PPS REVENUE CREDIT

## 2020-03-04 PROCEDURE — 3331090001 HH PPS REVENUE CREDIT

## 2020-03-04 PROCEDURE — 3331090002 HH PPS REVENUE DEBIT

## 2020-03-05 PROCEDURE — 3331090001 HH PPS REVENUE CREDIT

## 2020-03-05 PROCEDURE — 3331090002 HH PPS REVENUE DEBIT

## 2020-03-06 PROCEDURE — 3331090002 HH PPS REVENUE DEBIT

## 2020-03-06 PROCEDURE — 3331090001 HH PPS REVENUE CREDIT

## 2020-03-07 PROCEDURE — 3331090001 HH PPS REVENUE CREDIT

## 2020-03-07 PROCEDURE — 3331090002 HH PPS REVENUE DEBIT

## 2020-03-08 PROCEDURE — 3331090002 HH PPS REVENUE DEBIT

## 2020-03-08 PROCEDURE — 3331090001 HH PPS REVENUE CREDIT

## 2020-03-09 PROCEDURE — 3331090001 HH PPS REVENUE CREDIT

## 2020-03-09 PROCEDURE — 3331090002 HH PPS REVENUE DEBIT

## 2020-03-10 PROCEDURE — 3331090001 HH PPS REVENUE CREDIT

## 2020-03-10 PROCEDURE — 3331090002 HH PPS REVENUE DEBIT

## 2020-03-11 PROCEDURE — 3331090002 HH PPS REVENUE DEBIT

## 2020-03-11 PROCEDURE — 3331090001 HH PPS REVENUE CREDIT

## 2020-03-12 PROCEDURE — 3331090001 HH PPS REVENUE CREDIT

## 2020-03-12 PROCEDURE — 3331090002 HH PPS REVENUE DEBIT

## 2020-03-13 PROCEDURE — 3331090001 HH PPS REVENUE CREDIT

## 2020-03-13 PROCEDURE — 3331090002 HH PPS REVENUE DEBIT

## 2020-03-14 PROCEDURE — 3331090002 HH PPS REVENUE DEBIT

## 2020-03-14 PROCEDURE — 3331090001 HH PPS REVENUE CREDIT

## 2020-03-15 PROCEDURE — 3331090002 HH PPS REVENUE DEBIT

## 2020-03-15 PROCEDURE — 3331090001 HH PPS REVENUE CREDIT

## 2020-03-16 PROCEDURE — 3331090001 HH PPS REVENUE CREDIT

## 2020-03-16 PROCEDURE — 3331090002 HH PPS REVENUE DEBIT

## 2020-03-17 PROCEDURE — 3331090001 HH PPS REVENUE CREDIT

## 2020-03-17 PROCEDURE — 3331090002 HH PPS REVENUE DEBIT

## 2020-03-18 PROCEDURE — 3331090001 HH PPS REVENUE CREDIT

## 2020-03-18 PROCEDURE — 3331090002 HH PPS REVENUE DEBIT

## 2020-03-19 PROCEDURE — 3331090001 HH PPS REVENUE CREDIT

## 2020-03-19 PROCEDURE — 3331090002 HH PPS REVENUE DEBIT

## 2020-03-20 ENCOUNTER — HOME CARE VISIT (OUTPATIENT)
Dept: SCHEDULING | Facility: HOME HEALTH | Age: 85
End: 2020-03-20
Payer: MEDICARE

## 2020-03-20 VITALS
OXYGEN SATURATION: 99 % | SYSTOLIC BLOOD PRESSURE: 122 MMHG | HEART RATE: 86 BPM | DIASTOLIC BLOOD PRESSURE: 78 MMHG | TEMPERATURE: 98.2 F | RESPIRATION RATE: 18 BRPM

## 2020-03-20 PROCEDURE — 3331090002 HH PPS REVENUE DEBIT

## 2020-03-20 PROCEDURE — 3331090001 HH PPS REVENUE CREDIT

## 2020-03-20 PROCEDURE — G0299 HHS/HOSPICE OF RN EA 15 MIN: HCPCS

## 2020-03-20 PROCEDURE — 400013 HH SOC

## 2020-03-21 PROCEDURE — 3331090001 HH PPS REVENUE CREDIT

## 2020-03-21 PROCEDURE — 3331090002 HH PPS REVENUE DEBIT

## 2020-03-22 PROCEDURE — 3331090002 HH PPS REVENUE DEBIT

## 2020-03-22 PROCEDURE — 3331090001 HH PPS REVENUE CREDIT

## 2020-03-23 PROCEDURE — 3331090001 HH PPS REVENUE CREDIT

## 2020-03-23 PROCEDURE — 3331090002 HH PPS REVENUE DEBIT

## 2020-03-24 PROCEDURE — 3331090001 HH PPS REVENUE CREDIT

## 2020-03-24 PROCEDURE — 3331090002 HH PPS REVENUE DEBIT

## 2020-03-25 PROCEDURE — 3331090001 HH PPS REVENUE CREDIT

## 2020-03-25 PROCEDURE — 3331090002 HH PPS REVENUE DEBIT

## 2020-03-26 PROCEDURE — 3331090002 HH PPS REVENUE DEBIT

## 2020-03-26 PROCEDURE — 3331090001 HH PPS REVENUE CREDIT

## 2020-03-27 PROCEDURE — 3331090001 HH PPS REVENUE CREDIT

## 2020-03-27 PROCEDURE — 3331090002 HH PPS REVENUE DEBIT

## 2020-03-28 PROCEDURE — 3331090002 HH PPS REVENUE DEBIT

## 2020-03-28 PROCEDURE — 3331090001 HH PPS REVENUE CREDIT

## 2020-03-29 PROCEDURE — 3331090002 HH PPS REVENUE DEBIT

## 2020-03-29 PROCEDURE — 3331090001 HH PPS REVENUE CREDIT

## 2020-03-30 PROCEDURE — 3331090001 HH PPS REVENUE CREDIT

## 2020-03-30 PROCEDURE — 3331090002 HH PPS REVENUE DEBIT

## 2020-03-31 PROCEDURE — 3331090001 HH PPS REVENUE CREDIT

## 2020-03-31 PROCEDURE — 3331090002 HH PPS REVENUE DEBIT

## 2020-04-01 PROCEDURE — 3331090001 HH PPS REVENUE CREDIT

## 2020-04-01 PROCEDURE — 3331090002 HH PPS REVENUE DEBIT

## 2020-04-02 PROCEDURE — 3331090002 HH PPS REVENUE DEBIT

## 2020-04-02 PROCEDURE — 3331090001 HH PPS REVENUE CREDIT

## 2020-04-03 ENCOUNTER — HOME CARE VISIT (OUTPATIENT)
Dept: SCHEDULING | Facility: HOME HEALTH | Age: 85
End: 2020-04-03
Payer: MEDICARE

## 2020-04-03 PROCEDURE — 3331090001 HH PPS REVENUE CREDIT

## 2020-04-03 PROCEDURE — 3331090002 HH PPS REVENUE DEBIT

## 2020-04-04 ENCOUNTER — HOME CARE VISIT (OUTPATIENT)
Dept: SCHEDULING | Facility: HOME HEALTH | Age: 85
End: 2020-04-04
Payer: MEDICARE

## 2020-04-04 VITALS
SYSTOLIC BLOOD PRESSURE: 136 MMHG | TEMPERATURE: 97.5 F | OXYGEN SATURATION: 99 % | DIASTOLIC BLOOD PRESSURE: 68 MMHG | RESPIRATION RATE: 10 BRPM | HEART RATE: 70 BPM

## 2020-04-04 PROCEDURE — 3331090002 HH PPS REVENUE DEBIT

## 2020-04-04 PROCEDURE — G0299 HHS/HOSPICE OF RN EA 15 MIN: HCPCS

## 2020-04-04 PROCEDURE — 3331090001 HH PPS REVENUE CREDIT

## 2020-04-05 PROCEDURE — 3331090002 HH PPS REVENUE DEBIT

## 2020-04-05 PROCEDURE — 3331090001 HH PPS REVENUE CREDIT

## 2020-04-06 PROCEDURE — 3331090001 HH PPS REVENUE CREDIT

## 2020-04-06 PROCEDURE — 3331090002 HH PPS REVENUE DEBIT

## 2020-04-07 PROCEDURE — 3331090001 HH PPS REVENUE CREDIT

## 2020-04-07 PROCEDURE — 3331090002 HH PPS REVENUE DEBIT

## 2020-04-08 PROCEDURE — 3331090001 HH PPS REVENUE CREDIT

## 2020-04-08 PROCEDURE — 3331090002 HH PPS REVENUE DEBIT

## 2020-04-09 ENCOUNTER — HOME CARE VISIT (OUTPATIENT)
Dept: SCHEDULING | Facility: HOME HEALTH | Age: 85
End: 2020-04-09
Payer: MEDICARE

## 2020-04-09 VITALS
SYSTOLIC BLOOD PRESSURE: 122 MMHG | DIASTOLIC BLOOD PRESSURE: 84 MMHG | RESPIRATION RATE: 16 BRPM | TEMPERATURE: 97.6 F | OXYGEN SATURATION: 99 % | HEART RATE: 68 BPM

## 2020-04-09 PROCEDURE — 3331090001 HH PPS REVENUE CREDIT

## 2020-04-09 PROCEDURE — G0299 HHS/HOSPICE OF RN EA 15 MIN: HCPCS

## 2020-04-09 PROCEDURE — 3331090002 HH PPS REVENUE DEBIT

## 2020-04-09 PROCEDURE — 400014 HH F/U

## 2020-04-10 PROCEDURE — 3331090002 HH PPS REVENUE DEBIT

## 2020-04-10 PROCEDURE — 3331090001 HH PPS REVENUE CREDIT

## 2020-04-11 PROCEDURE — 3331090001 HH PPS REVENUE CREDIT

## 2020-04-11 PROCEDURE — 3331090002 HH PPS REVENUE DEBIT

## 2020-04-12 PROCEDURE — 3331090002 HH PPS REVENUE DEBIT

## 2020-04-12 PROCEDURE — 3331090001 HH PPS REVENUE CREDIT

## 2020-04-13 PROCEDURE — 3331090001 HH PPS REVENUE CREDIT

## 2020-04-13 PROCEDURE — 3331090002 HH PPS REVENUE DEBIT

## 2020-04-14 PROCEDURE — 3331090001 HH PPS REVENUE CREDIT

## 2020-04-14 PROCEDURE — 3331090002 HH PPS REVENUE DEBIT

## 2020-04-15 PROCEDURE — 3331090002 HH PPS REVENUE DEBIT

## 2020-04-15 PROCEDURE — 3331090001 HH PPS REVENUE CREDIT

## 2020-04-16 PROCEDURE — 3331090002 HH PPS REVENUE DEBIT

## 2020-04-16 PROCEDURE — 3331090001 HH PPS REVENUE CREDIT

## 2020-04-17 PROCEDURE — 3331090001 HH PPS REVENUE CREDIT

## 2020-04-17 PROCEDURE — 3331090002 HH PPS REVENUE DEBIT

## 2020-04-18 PROCEDURE — 3331090001 HH PPS REVENUE CREDIT

## 2020-04-18 PROCEDURE — 3331090002 HH PPS REVENUE DEBIT

## 2020-04-19 PROCEDURE — 3331090001 HH PPS REVENUE CREDIT

## 2020-04-19 PROCEDURE — 3331090002 HH PPS REVENUE DEBIT

## 2020-04-20 PROCEDURE — 3331090001 HH PPS REVENUE CREDIT

## 2020-04-20 PROCEDURE — 3331090002 HH PPS REVENUE DEBIT

## 2020-04-21 PROCEDURE — 3331090002 HH PPS REVENUE DEBIT

## 2020-04-21 PROCEDURE — 3331090001 HH PPS REVENUE CREDIT

## 2020-04-22 PROCEDURE — 3331090001 HH PPS REVENUE CREDIT

## 2020-04-22 PROCEDURE — 3331090002 HH PPS REVENUE DEBIT

## 2020-04-23 PROCEDURE — 3331090001 HH PPS REVENUE CREDIT

## 2020-04-23 PROCEDURE — 3331090002 HH PPS REVENUE DEBIT

## 2020-04-24 PROCEDURE — 3331090001 HH PPS REVENUE CREDIT

## 2020-04-24 PROCEDURE — 3331090002 HH PPS REVENUE DEBIT

## 2020-04-25 PROCEDURE — 3331090001 HH PPS REVENUE CREDIT

## 2020-04-25 PROCEDURE — 3331090002 HH PPS REVENUE DEBIT

## 2020-04-26 PROCEDURE — 3331090002 HH PPS REVENUE DEBIT

## 2020-04-26 PROCEDURE — 3331090001 HH PPS REVENUE CREDIT

## 2020-04-27 PROCEDURE — 3331090002 HH PPS REVENUE DEBIT

## 2020-04-27 PROCEDURE — 3331090001 HH PPS REVENUE CREDIT

## 2020-04-28 PROCEDURE — 3331090001 HH PPS REVENUE CREDIT

## 2020-04-28 PROCEDURE — 3331090002 HH PPS REVENUE DEBIT

## 2020-04-29 PROCEDURE — 3331090001 HH PPS REVENUE CREDIT

## 2020-04-29 PROCEDURE — 3331090002 HH PPS REVENUE DEBIT

## 2020-04-30 ENCOUNTER — HOME CARE VISIT (OUTPATIENT)
Dept: SCHEDULING | Facility: HOME HEALTH | Age: 85
End: 2020-04-30
Payer: MEDICARE

## 2020-04-30 VITALS
SYSTOLIC BLOOD PRESSURE: 124 MMHG | HEART RATE: 80 BPM | RESPIRATION RATE: 16 BRPM | OXYGEN SATURATION: 98 % | TEMPERATURE: 98.2 F | DIASTOLIC BLOOD PRESSURE: 76 MMHG

## 2020-04-30 PROCEDURE — G0299 HHS/HOSPICE OF RN EA 15 MIN: HCPCS

## 2020-04-30 PROCEDURE — 3331090001 HH PPS REVENUE CREDIT

## 2020-04-30 PROCEDURE — 3331090002 HH PPS REVENUE DEBIT

## 2020-05-01 PROCEDURE — 3331090001 HH PPS REVENUE CREDIT

## 2020-05-01 PROCEDURE — 3331090002 HH PPS REVENUE DEBIT

## 2020-05-02 PROCEDURE — 3331090002 HH PPS REVENUE DEBIT

## 2020-05-02 PROCEDURE — 3331090001 HH PPS REVENUE CREDIT

## 2020-05-03 PROCEDURE — 3331090001 HH PPS REVENUE CREDIT

## 2020-05-03 PROCEDURE — 3331090002 HH PPS REVENUE DEBIT

## 2020-05-04 PROCEDURE — 3331090001 HH PPS REVENUE CREDIT

## 2020-05-04 PROCEDURE — 3331090002 HH PPS REVENUE DEBIT

## 2020-05-05 PROCEDURE — 3331090001 HH PPS REVENUE CREDIT

## 2020-05-05 PROCEDURE — 3331090002 HH PPS REVENUE DEBIT

## 2020-05-06 PROCEDURE — 3331090001 HH PPS REVENUE CREDIT

## 2020-05-06 PROCEDURE — 3331090002 HH PPS REVENUE DEBIT

## 2020-05-07 PROCEDURE — 3331090002 HH PPS REVENUE DEBIT

## 2020-05-07 PROCEDURE — 3331090001 HH PPS REVENUE CREDIT

## 2020-05-08 PROCEDURE — 3331090001 HH PPS REVENUE CREDIT

## 2020-05-08 PROCEDURE — 3331090002 HH PPS REVENUE DEBIT

## 2020-05-09 PROCEDURE — 3331090001 HH PPS REVENUE CREDIT

## 2020-05-09 PROCEDURE — 3331090002 HH PPS REVENUE DEBIT

## 2020-05-10 PROCEDURE — 3331090001 HH PPS REVENUE CREDIT

## 2020-05-10 PROCEDURE — 3331090002 HH PPS REVENUE DEBIT

## 2020-05-11 PROCEDURE — 3331090002 HH PPS REVENUE DEBIT

## 2020-05-11 PROCEDURE — 3331090001 HH PPS REVENUE CREDIT

## 2020-05-12 PROCEDURE — 3331090002 HH PPS REVENUE DEBIT

## 2020-05-12 PROCEDURE — 3331090001 HH PPS REVENUE CREDIT

## 2020-05-13 PROCEDURE — 3331090002 HH PPS REVENUE DEBIT

## 2020-05-13 PROCEDURE — 3331090001 HH PPS REVENUE CREDIT

## 2020-05-14 PROCEDURE — 3331090002 HH PPS REVENUE DEBIT

## 2020-05-14 PROCEDURE — 3331090001 HH PPS REVENUE CREDIT

## 2020-05-15 PROCEDURE — 3331090001 HH PPS REVENUE CREDIT

## 2020-05-15 PROCEDURE — 3331090002 HH PPS REVENUE DEBIT

## 2020-05-16 PROCEDURE — 3331090001 HH PPS REVENUE CREDIT

## 2020-05-16 PROCEDURE — 3331090002 HH PPS REVENUE DEBIT

## 2020-05-17 PROCEDURE — 3331090002 HH PPS REVENUE DEBIT

## 2020-05-17 PROCEDURE — 3331090001 HH PPS REVENUE CREDIT

## 2020-05-18 PROCEDURE — 3331090002 HH PPS REVENUE DEBIT

## 2020-05-18 PROCEDURE — 3331090001 HH PPS REVENUE CREDIT

## 2020-05-19 PROCEDURE — 3331090002 HH PPS REVENUE DEBIT

## 2020-05-19 PROCEDURE — 3331090001 HH PPS REVENUE CREDIT

## 2020-05-20 PROCEDURE — 3331090001 HH PPS REVENUE CREDIT

## 2020-05-20 PROCEDURE — 3331090002 HH PPS REVENUE DEBIT

## 2020-05-21 ENCOUNTER — HOME CARE VISIT (OUTPATIENT)
Dept: SCHEDULING | Facility: HOME HEALTH | Age: 85
End: 2020-05-21
Payer: MEDICARE

## 2020-05-21 VITALS
TEMPERATURE: 97.6 F | HEART RATE: 64 BPM | OXYGEN SATURATION: 98 % | DIASTOLIC BLOOD PRESSURE: 82 MMHG | SYSTOLIC BLOOD PRESSURE: 144 MMHG | RESPIRATION RATE: 16 BRPM

## 2020-05-21 PROCEDURE — 400014 HH F/U

## 2020-05-21 PROCEDURE — A4657 SYRINGE W/WO NEEDLE: HCPCS

## 2020-05-21 PROCEDURE — 3331090002 HH PPS REVENUE DEBIT

## 2020-05-21 PROCEDURE — 3331090001 HH PPS REVENUE CREDIT

## 2020-05-21 PROCEDURE — A4314 CATH W/DRAINAGE 2-WAY LATEX: HCPCS

## 2020-05-21 PROCEDURE — G0299 HHS/HOSPICE OF RN EA 15 MIN: HCPCS

## 2020-05-22 PROCEDURE — 3331090002 HH PPS REVENUE DEBIT

## 2020-05-22 PROCEDURE — 3331090001 HH PPS REVENUE CREDIT

## 2020-05-23 PROCEDURE — 3331090001 HH PPS REVENUE CREDIT

## 2020-05-23 PROCEDURE — 3331090002 HH PPS REVENUE DEBIT

## 2020-05-24 PROCEDURE — 3331090002 HH PPS REVENUE DEBIT

## 2020-05-24 PROCEDURE — 3331090001 HH PPS REVENUE CREDIT

## 2020-05-25 PROCEDURE — 3331090001 HH PPS REVENUE CREDIT

## 2020-05-25 PROCEDURE — 3331090002 HH PPS REVENUE DEBIT

## 2020-05-26 PROCEDURE — 3331090001 HH PPS REVENUE CREDIT

## 2020-05-26 PROCEDURE — 3331090002 HH PPS REVENUE DEBIT

## 2020-05-27 PROCEDURE — 3331090001 HH PPS REVENUE CREDIT

## 2020-05-27 PROCEDURE — 3331090002 HH PPS REVENUE DEBIT

## 2020-05-28 PROCEDURE — 3331090002 HH PPS REVENUE DEBIT

## 2020-05-28 PROCEDURE — 3331090001 HH PPS REVENUE CREDIT

## 2020-05-29 PROCEDURE — 3331090001 HH PPS REVENUE CREDIT

## 2020-05-29 PROCEDURE — 3331090002 HH PPS REVENUE DEBIT

## 2020-05-30 PROCEDURE — 3331090002 HH PPS REVENUE DEBIT

## 2020-05-30 PROCEDURE — 3331090001 HH PPS REVENUE CREDIT

## 2020-05-31 PROCEDURE — 3331090001 HH PPS REVENUE CREDIT

## 2020-05-31 PROCEDURE — 3331090002 HH PPS REVENUE DEBIT

## 2020-06-01 PROCEDURE — 3331090002 HH PPS REVENUE DEBIT

## 2020-06-01 PROCEDURE — 3331090001 HH PPS REVENUE CREDIT

## 2020-06-02 ENCOUNTER — HOME CARE VISIT (OUTPATIENT)
Dept: SCHEDULING | Facility: HOME HEALTH | Age: 85
End: 2020-06-02
Payer: MEDICARE

## 2020-06-02 VITALS
HEART RATE: 80 BPM | RESPIRATION RATE: 16 BRPM | TEMPERATURE: 98.2 F | OXYGEN SATURATION: 96 % | DIASTOLIC BLOOD PRESSURE: 80 MMHG | SYSTOLIC BLOOD PRESSURE: 136 MMHG

## 2020-06-02 PROCEDURE — G0299 HHS/HOSPICE OF RN EA 15 MIN: HCPCS

## 2020-06-02 PROCEDURE — 3331090002 HH PPS REVENUE DEBIT

## 2020-06-02 PROCEDURE — 3331090001 HH PPS REVENUE CREDIT

## 2020-06-03 PROCEDURE — 3331090001 HH PPS REVENUE CREDIT

## 2020-06-03 PROCEDURE — 3331090002 HH PPS REVENUE DEBIT

## 2020-06-04 PROCEDURE — 3331090002 HH PPS REVENUE DEBIT

## 2020-06-04 PROCEDURE — 3331090001 HH PPS REVENUE CREDIT

## 2020-06-05 PROCEDURE — 3331090002 HH PPS REVENUE DEBIT

## 2020-06-05 PROCEDURE — 3331090001 HH PPS REVENUE CREDIT

## 2020-06-06 PROCEDURE — 3331090001 HH PPS REVENUE CREDIT

## 2020-06-06 PROCEDURE — 3331090002 HH PPS REVENUE DEBIT

## 2020-06-07 PROCEDURE — 3331090002 HH PPS REVENUE DEBIT

## 2020-06-07 PROCEDURE — 3331090001 HH PPS REVENUE CREDIT

## 2020-06-08 PROCEDURE — 3331090002 HH PPS REVENUE DEBIT

## 2020-06-08 PROCEDURE — 3331090001 HH PPS REVENUE CREDIT

## 2020-06-09 PROCEDURE — 3331090001 HH PPS REVENUE CREDIT

## 2020-06-09 PROCEDURE — 3331090002 HH PPS REVENUE DEBIT

## 2020-06-10 PROCEDURE — 3331090002 HH PPS REVENUE DEBIT

## 2020-06-10 PROCEDURE — 3331090001 HH PPS REVENUE CREDIT

## 2020-06-11 ENCOUNTER — HOME CARE VISIT (OUTPATIENT)
Dept: SCHEDULING | Facility: HOME HEALTH | Age: 85
End: 2020-06-11
Payer: MEDICARE

## 2020-06-11 VITALS
HEART RATE: 68 BPM | DIASTOLIC BLOOD PRESSURE: 64 MMHG | TEMPERATURE: 98.3 F | OXYGEN SATURATION: 99 % | SYSTOLIC BLOOD PRESSURE: 114 MMHG | RESPIRATION RATE: 16 BRPM

## 2020-06-11 PROCEDURE — G0299 HHS/HOSPICE OF RN EA 15 MIN: HCPCS

## 2020-06-11 PROCEDURE — 3331090001 HH PPS REVENUE CREDIT

## 2020-06-11 PROCEDURE — 400014 HH F/U

## 2020-06-11 PROCEDURE — 3331090002 HH PPS REVENUE DEBIT

## 2020-06-12 PROCEDURE — 3331090002 HH PPS REVENUE DEBIT

## 2020-06-12 PROCEDURE — 3331090001 HH PPS REVENUE CREDIT

## 2020-06-13 PROCEDURE — 3331090002 HH PPS REVENUE DEBIT

## 2020-06-13 PROCEDURE — 3331090001 HH PPS REVENUE CREDIT

## 2020-06-14 PROCEDURE — 3331090002 HH PPS REVENUE DEBIT

## 2020-06-14 PROCEDURE — 3331090001 HH PPS REVENUE CREDIT

## 2020-06-15 PROCEDURE — 3331090002 HH PPS REVENUE DEBIT

## 2020-06-15 PROCEDURE — 3331090001 HH PPS REVENUE CREDIT

## 2020-06-16 PROCEDURE — 3331090001 HH PPS REVENUE CREDIT

## 2020-06-16 PROCEDURE — 3331090002 HH PPS REVENUE DEBIT

## 2020-06-17 PROCEDURE — 3331090001 HH PPS REVENUE CREDIT

## 2020-06-17 PROCEDURE — 3331090002 HH PPS REVENUE DEBIT

## 2020-06-18 PROCEDURE — 3331090001 HH PPS REVENUE CREDIT

## 2020-06-18 PROCEDURE — 3331090002 HH PPS REVENUE DEBIT

## 2020-06-19 PROCEDURE — 3331090002 HH PPS REVENUE DEBIT

## 2020-06-19 PROCEDURE — 3331090001 HH PPS REVENUE CREDIT

## 2020-06-20 PROCEDURE — 3331090002 HH PPS REVENUE DEBIT

## 2020-06-20 PROCEDURE — 3331090001 HH PPS REVENUE CREDIT

## 2020-06-21 PROCEDURE — 3331090001 HH PPS REVENUE CREDIT

## 2020-06-21 PROCEDURE — 3331090002 HH PPS REVENUE DEBIT

## 2020-06-22 PROCEDURE — 3331090001 HH PPS REVENUE CREDIT

## 2020-06-22 PROCEDURE — 3331090002 HH PPS REVENUE DEBIT

## 2020-06-23 PROCEDURE — 3331090001 HH PPS REVENUE CREDIT

## 2020-06-23 PROCEDURE — 3331090002 HH PPS REVENUE DEBIT

## 2020-06-24 PROCEDURE — 3331090001 HH PPS REVENUE CREDIT

## 2020-06-24 PROCEDURE — 3331090002 HH PPS REVENUE DEBIT

## 2020-06-25 PROCEDURE — 3331090002 HH PPS REVENUE DEBIT

## 2020-06-25 PROCEDURE — 3331090001 HH PPS REVENUE CREDIT

## 2020-06-26 PROCEDURE — 3331090001 HH PPS REVENUE CREDIT

## 2020-06-26 PROCEDURE — 3331090002 HH PPS REVENUE DEBIT

## 2020-06-27 PROCEDURE — 3331090001 HH PPS REVENUE CREDIT

## 2020-06-27 PROCEDURE — 3331090002 HH PPS REVENUE DEBIT

## 2020-06-28 PROCEDURE — 3331090001 HH PPS REVENUE CREDIT

## 2020-06-28 PROCEDURE — 3331090002 HH PPS REVENUE DEBIT

## 2020-06-29 PROCEDURE — 3331090001 HH PPS REVENUE CREDIT

## 2020-06-29 PROCEDURE — 3331090002 HH PPS REVENUE DEBIT

## 2020-06-30 PROCEDURE — A4333 URINARY CATH ANCHOR DEVICE: HCPCS

## 2020-06-30 PROCEDURE — A4314 CATH W/DRAINAGE 2-WAY LATEX: HCPCS

## 2020-06-30 PROCEDURE — 3331090001 HH PPS REVENUE CREDIT

## 2020-06-30 PROCEDURE — 3331090002 HH PPS REVENUE DEBIT

## 2020-07-01 PROCEDURE — 3331090002 HH PPS REVENUE DEBIT

## 2020-07-01 PROCEDURE — 3331090001 HH PPS REVENUE CREDIT

## 2020-07-02 ENCOUNTER — HOME CARE VISIT (OUTPATIENT)
Dept: SCHEDULING | Facility: HOME HEALTH | Age: 85
End: 2020-07-02
Payer: MEDICARE

## 2020-07-02 PROCEDURE — G0299 HHS/HOSPICE OF RN EA 15 MIN: HCPCS

## 2020-07-02 PROCEDURE — 3331090002 HH PPS REVENUE DEBIT

## 2020-07-02 PROCEDURE — 3331090001 HH PPS REVENUE CREDIT

## 2020-07-03 VITALS
SYSTOLIC BLOOD PRESSURE: 124 MMHG | OXYGEN SATURATION: 98 % | DIASTOLIC BLOOD PRESSURE: 74 MMHG | HEART RATE: 76 BPM | RESPIRATION RATE: 16 BRPM | TEMPERATURE: 98.7 F

## 2020-07-03 PROCEDURE — 3331090002 HH PPS REVENUE DEBIT

## 2020-07-03 PROCEDURE — 3331090001 HH PPS REVENUE CREDIT

## 2020-07-04 PROCEDURE — 3331090002 HH PPS REVENUE DEBIT

## 2020-07-04 PROCEDURE — 3331090001 HH PPS REVENUE CREDIT

## 2020-07-05 PROCEDURE — 3331090002 HH PPS REVENUE DEBIT

## 2020-07-05 PROCEDURE — 3331090001 HH PPS REVENUE CREDIT

## 2020-07-06 PROCEDURE — 3331090001 HH PPS REVENUE CREDIT

## 2020-07-06 PROCEDURE — 3331090002 HH PPS REVENUE DEBIT

## 2020-07-07 PROCEDURE — 3331090002 HH PPS REVENUE DEBIT

## 2020-07-07 PROCEDURE — 3331090001 HH PPS REVENUE CREDIT

## 2020-07-08 PROCEDURE — 3331090002 HH PPS REVENUE DEBIT

## 2020-07-08 PROCEDURE — 3331090001 HH PPS REVENUE CREDIT

## 2020-07-09 PROCEDURE — 3331090001 HH PPS REVENUE CREDIT

## 2020-07-09 PROCEDURE — 3331090002 HH PPS REVENUE DEBIT

## 2020-07-10 PROCEDURE — 3331090001 HH PPS REVENUE CREDIT

## 2020-07-10 PROCEDURE — 3331090002 HH PPS REVENUE DEBIT

## 2020-07-11 PROCEDURE — 3331090002 HH PPS REVENUE DEBIT

## 2020-07-11 PROCEDURE — 3331090001 HH PPS REVENUE CREDIT

## 2020-07-12 PROCEDURE — 3331090002 HH PPS REVENUE DEBIT

## 2020-07-12 PROCEDURE — 3331090001 HH PPS REVENUE CREDIT

## 2020-07-13 PROCEDURE — 3331090002 HH PPS REVENUE DEBIT

## 2020-07-13 PROCEDURE — 3331090001 HH PPS REVENUE CREDIT

## 2020-07-14 PROCEDURE — 3331090002 HH PPS REVENUE DEBIT

## 2020-07-14 PROCEDURE — 3331090001 HH PPS REVENUE CREDIT

## 2020-07-15 PROCEDURE — 3331090001 HH PPS REVENUE CREDIT

## 2020-07-15 PROCEDURE — 3331090002 HH PPS REVENUE DEBIT

## 2020-07-16 PROCEDURE — 3331090002 HH PPS REVENUE DEBIT

## 2020-07-16 PROCEDURE — 3331090001 HH PPS REVENUE CREDIT

## 2020-07-17 PROCEDURE — 3331090001 HH PPS REVENUE CREDIT

## 2020-07-17 PROCEDURE — 3331090002 HH PPS REVENUE DEBIT

## 2020-07-18 PROCEDURE — 3331090001 HH PPS REVENUE CREDIT

## 2020-07-18 PROCEDURE — 3331090002 HH PPS REVENUE DEBIT

## 2020-07-19 PROCEDURE — 3331090001 HH PPS REVENUE CREDIT

## 2020-07-19 PROCEDURE — 3331090002 HH PPS REVENUE DEBIT

## 2020-07-20 PROCEDURE — 3331090001 HH PPS REVENUE CREDIT

## 2020-07-20 PROCEDURE — 3331090002 HH PPS REVENUE DEBIT

## 2020-07-21 PROCEDURE — 3331090001 HH PPS REVENUE CREDIT

## 2020-07-21 PROCEDURE — 3331090002 HH PPS REVENUE DEBIT

## 2020-07-22 ENCOUNTER — HOME CARE VISIT (OUTPATIENT)
Dept: SCHEDULING | Facility: HOME HEALTH | Age: 85
End: 2020-07-22
Payer: MEDICARE

## 2020-07-22 VITALS
SYSTOLIC BLOOD PRESSURE: 128 MMHG | OXYGEN SATURATION: 97 % | HEART RATE: 72 BPM | TEMPERATURE: 97.2 F | DIASTOLIC BLOOD PRESSURE: 82 MMHG | RESPIRATION RATE: 18 BRPM

## 2020-07-22 PROCEDURE — A4314 CATH W/DRAINAGE 2-WAY LATEX: HCPCS

## 2020-07-22 PROCEDURE — A4657 SYRINGE W/WO NEEDLE: HCPCS

## 2020-07-22 PROCEDURE — 3331090002 HH PPS REVENUE DEBIT

## 2020-07-22 PROCEDURE — 400014 HH F/U

## 2020-07-22 PROCEDURE — 3331090001 HH PPS REVENUE CREDIT

## 2020-07-22 PROCEDURE — G0299 HHS/HOSPICE OF RN EA 15 MIN: HCPCS

## 2020-07-22 PROCEDURE — A4333 URINARY CATH ANCHOR DEVICE: HCPCS

## 2020-07-23 PROCEDURE — 3331090002 HH PPS REVENUE DEBIT

## 2020-07-23 PROCEDURE — 3331090001 HH PPS REVENUE CREDIT

## 2020-07-24 PROCEDURE — 3331090002 HH PPS REVENUE DEBIT

## 2020-07-24 PROCEDURE — 3331090001 HH PPS REVENUE CREDIT

## 2020-07-25 PROCEDURE — 3331090002 HH PPS REVENUE DEBIT

## 2020-07-25 PROCEDURE — 3331090001 HH PPS REVENUE CREDIT

## 2020-07-26 PROCEDURE — 3331090002 HH PPS REVENUE DEBIT

## 2020-07-26 PROCEDURE — 3331090001 HH PPS REVENUE CREDIT

## 2020-07-27 PROCEDURE — 3331090002 HH PPS REVENUE DEBIT

## 2020-07-27 PROCEDURE — 3331090001 HH PPS REVENUE CREDIT

## 2020-07-28 PROCEDURE — 3331090002 HH PPS REVENUE DEBIT

## 2020-07-28 PROCEDURE — 3331090001 HH PPS REVENUE CREDIT

## 2020-07-29 PROCEDURE — 3331090001 HH PPS REVENUE CREDIT

## 2020-07-29 PROCEDURE — 3331090002 HH PPS REVENUE DEBIT

## 2020-07-30 ENCOUNTER — HOME CARE VISIT (OUTPATIENT)
Dept: SCHEDULING | Facility: HOME HEALTH | Age: 85
End: 2020-07-30
Payer: MEDICARE

## 2020-07-30 PROCEDURE — 3331090001 HH PPS REVENUE CREDIT

## 2020-07-30 PROCEDURE — G0299 HHS/HOSPICE OF RN EA 15 MIN: HCPCS

## 2020-07-30 PROCEDURE — 3331090002 HH PPS REVENUE DEBIT

## 2020-07-31 VITALS
SYSTOLIC BLOOD PRESSURE: 120 MMHG | RESPIRATION RATE: 14 BRPM | TEMPERATURE: 98.7 F | OXYGEN SATURATION: 99 % | HEART RATE: 70 BPM | DIASTOLIC BLOOD PRESSURE: 70 MMHG

## 2020-07-31 PROCEDURE — 3331090002 HH PPS REVENUE DEBIT

## 2020-07-31 PROCEDURE — 3331090001 HH PPS REVENUE CREDIT

## 2020-08-01 PROCEDURE — 3331090002 HH PPS REVENUE DEBIT

## 2020-08-01 PROCEDURE — 3331090001 HH PPS REVENUE CREDIT

## 2020-08-02 PROCEDURE — 3331090002 HH PPS REVENUE DEBIT

## 2020-08-02 PROCEDURE — 3331090001 HH PPS REVENUE CREDIT

## 2020-08-03 PROCEDURE — 3331090001 HH PPS REVENUE CREDIT

## 2020-08-03 PROCEDURE — 3331090002 HH PPS REVENUE DEBIT

## 2020-08-04 PROCEDURE — 3331090001 HH PPS REVENUE CREDIT

## 2020-08-04 PROCEDURE — 3331090002 HH PPS REVENUE DEBIT

## 2020-08-05 PROCEDURE — 3331090002 HH PPS REVENUE DEBIT

## 2020-08-05 PROCEDURE — 3331090001 HH PPS REVENUE CREDIT

## 2020-08-06 PROCEDURE — 3331090002 HH PPS REVENUE DEBIT

## 2020-08-06 PROCEDURE — 3331090001 HH PPS REVENUE CREDIT

## 2020-08-07 PROCEDURE — 3331090001 HH PPS REVENUE CREDIT

## 2020-08-07 PROCEDURE — 3331090002 HH PPS REVENUE DEBIT

## 2020-08-08 PROCEDURE — 3331090001 HH PPS REVENUE CREDIT

## 2020-08-08 PROCEDURE — 3331090002 HH PPS REVENUE DEBIT

## 2020-08-09 PROCEDURE — 3331090002 HH PPS REVENUE DEBIT

## 2020-08-09 PROCEDURE — 3331090001 HH PPS REVENUE CREDIT

## 2020-08-10 PROCEDURE — 3331090001 HH PPS REVENUE CREDIT

## 2020-08-10 PROCEDURE — 3331090002 HH PPS REVENUE DEBIT

## 2020-08-11 PROCEDURE — 3331090002 HH PPS REVENUE DEBIT

## 2020-08-11 PROCEDURE — 3331090001 HH PPS REVENUE CREDIT

## 2020-08-12 PROCEDURE — 3331090002 HH PPS REVENUE DEBIT

## 2020-08-12 PROCEDURE — 3331090001 HH PPS REVENUE CREDIT

## 2020-08-13 ENCOUNTER — HOME CARE VISIT (OUTPATIENT)
Dept: SCHEDULING | Facility: HOME HEALTH | Age: 85
End: 2020-08-13
Payer: MEDICARE

## 2020-08-13 VITALS
TEMPERATURE: 98.4 F | BODY MASS INDEX: 24.95 KG/M2 | SYSTOLIC BLOOD PRESSURE: 147 MMHG | HEART RATE: 75 BPM | RESPIRATION RATE: 18 BRPM | DIASTOLIC BLOOD PRESSURE: 82 MMHG | WEIGHT: 184 LBS | OXYGEN SATURATION: 99 %

## 2020-08-13 PROCEDURE — 3331090001 HH PPS REVENUE CREDIT

## 2020-08-13 PROCEDURE — A4649 SURGICAL SUPPLIES: HCPCS

## 2020-08-13 PROCEDURE — G0299 HHS/HOSPICE OF RN EA 15 MIN: HCPCS

## 2020-08-13 PROCEDURE — 3331090002 HH PPS REVENUE DEBIT

## 2020-08-13 PROCEDURE — A4320 IRRIGATION TRAY: HCPCS

## 2020-08-13 PROCEDURE — 400014 HH F/U

## 2020-08-13 PROCEDURE — A4310 INSERT TRAY W/O BAG/CATH: HCPCS

## 2020-08-14 PROCEDURE — 3331090002 HH PPS REVENUE DEBIT

## 2020-08-14 PROCEDURE — 3331090001 HH PPS REVENUE CREDIT

## 2020-08-15 PROCEDURE — 3331090001 HH PPS REVENUE CREDIT

## 2020-08-15 PROCEDURE — 3331090002 HH PPS REVENUE DEBIT

## 2020-08-16 PROCEDURE — 3331090002 HH PPS REVENUE DEBIT

## 2020-08-16 PROCEDURE — 3331090001 HH PPS REVENUE CREDIT

## 2020-08-17 PROCEDURE — 3331090001 HH PPS REVENUE CREDIT

## 2020-08-17 PROCEDURE — 3331090002 HH PPS REVENUE DEBIT

## 2020-08-18 PROCEDURE — 3331090001 HH PPS REVENUE CREDIT

## 2020-08-18 PROCEDURE — 3331090002 HH PPS REVENUE DEBIT

## 2020-08-19 PROCEDURE — 3331090002 HH PPS REVENUE DEBIT

## 2020-08-19 PROCEDURE — 3331090001 HH PPS REVENUE CREDIT

## 2020-08-20 PROCEDURE — 3331090002 HH PPS REVENUE DEBIT

## 2020-08-20 PROCEDURE — 3331090001 HH PPS REVENUE CREDIT

## 2020-08-21 PROCEDURE — 3331090001 HH PPS REVENUE CREDIT

## 2020-08-21 PROCEDURE — 3331090002 HH PPS REVENUE DEBIT

## 2020-08-22 PROCEDURE — 3331090001 HH PPS REVENUE CREDIT

## 2020-08-22 PROCEDURE — 3331090002 HH PPS REVENUE DEBIT

## 2020-08-23 PROCEDURE — 3331090001 HH PPS REVENUE CREDIT

## 2020-08-23 PROCEDURE — 3331090002 HH PPS REVENUE DEBIT

## 2020-08-24 PROCEDURE — 3331090002 HH PPS REVENUE DEBIT

## 2020-08-24 PROCEDURE — 3331090001 HH PPS REVENUE CREDIT

## 2020-08-25 PROCEDURE — 3331090001 HH PPS REVENUE CREDIT

## 2020-08-25 PROCEDURE — 3331090002 HH PPS REVENUE DEBIT

## 2020-08-26 PROCEDURE — 3331090001 HH PPS REVENUE CREDIT

## 2020-08-26 PROCEDURE — 3331090002 HH PPS REVENUE DEBIT

## 2020-08-27 PROCEDURE — 3331090002 HH PPS REVENUE DEBIT

## 2020-08-27 PROCEDURE — 3331090001 HH PPS REVENUE CREDIT

## 2020-08-28 ENCOUNTER — HOME CARE VISIT (OUTPATIENT)
Dept: SCHEDULING | Facility: HOME HEALTH | Age: 85
End: 2020-08-28
Payer: MEDICARE

## 2020-08-28 VITALS — SYSTOLIC BLOOD PRESSURE: 124 MMHG | DIASTOLIC BLOOD PRESSURE: 12 MMHG

## 2020-08-28 PROCEDURE — G0299 HHS/HOSPICE OF RN EA 15 MIN: HCPCS

## 2020-08-28 PROCEDURE — 3331090002 HH PPS REVENUE DEBIT

## 2020-08-28 PROCEDURE — 3331090001 HH PPS REVENUE CREDIT

## 2020-08-29 PROCEDURE — 3331090002 HH PPS REVENUE DEBIT

## 2020-08-29 PROCEDURE — 3331090001 HH PPS REVENUE CREDIT

## 2020-08-30 PROCEDURE — 3331090002 HH PPS REVENUE DEBIT

## 2020-08-30 PROCEDURE — 3331090001 HH PPS REVENUE CREDIT

## 2020-08-31 PROCEDURE — 3331090001 HH PPS REVENUE CREDIT

## 2020-08-31 PROCEDURE — 3331090002 HH PPS REVENUE DEBIT

## 2020-09-01 PROCEDURE — 3331090001 HH PPS REVENUE CREDIT

## 2020-09-01 PROCEDURE — 3331090002 HH PPS REVENUE DEBIT

## 2020-09-02 ENCOUNTER — HOME CARE VISIT (OUTPATIENT)
Dept: SCHEDULING | Facility: HOME HEALTH | Age: 85
End: 2020-09-02
Payer: MEDICARE

## 2020-09-02 VITALS
RESPIRATION RATE: 18 BRPM | OXYGEN SATURATION: 98 % | HEART RATE: 74 BPM | TEMPERATURE: 98.2 F | DIASTOLIC BLOOD PRESSURE: 78 MMHG | SYSTOLIC BLOOD PRESSURE: 124 MMHG

## 2020-09-02 PROCEDURE — 3331090001 HH PPS REVENUE CREDIT

## 2020-09-02 PROCEDURE — G0151 HHCP-SERV OF PT,EA 15 MIN: HCPCS

## 2020-09-02 PROCEDURE — 3331090002 HH PPS REVENUE DEBIT

## 2020-09-03 PROCEDURE — 3331090002 HH PPS REVENUE DEBIT

## 2020-09-03 PROCEDURE — 3331090001 HH PPS REVENUE CREDIT

## 2020-09-04 ENCOUNTER — HOSPITAL ENCOUNTER (OUTPATIENT)
Dept: LAB | Age: 85
Discharge: HOME | End: 2020-09-04
Payer: MEDICARE

## 2020-09-04 ENCOUNTER — HOME CARE VISIT (OUTPATIENT)
Dept: SCHEDULING | Facility: HOME HEALTH | Age: 85
End: 2020-09-04
Payer: MEDICARE

## 2020-09-04 VITALS
HEART RATE: 60 BPM | TEMPERATURE: 98 F | DIASTOLIC BLOOD PRESSURE: 68 MMHG | RESPIRATION RATE: 18 BRPM | OXYGEN SATURATION: 96 % | SYSTOLIC BLOOD PRESSURE: 146 MMHG

## 2020-09-04 LAB
APPEARANCE UR: ABNORMAL
BACTERIA URNS QL MICRO: 0 /HPF
BILIRUB UR QL: ABNORMAL
CASTS URNS QL MICRO: 0 /LPF
COLOR UR: ABNORMAL
CRYSTALS URNS QL MICRO: 0 /LPF
EPI CELLS #/AREA URNS HPF: NORMAL /HPF
GLUCOSE UR STRIP.AUTO-MCNC: NEGATIVE MG/DL
HGB UR QL STRIP: ABNORMAL
KETONES UR QL STRIP.AUTO: 15 MG/DL
LEUKOCYTE ESTERASE UR QL STRIP.AUTO: ABNORMAL
MUCOUS THREADS URNS QL MICRO: 0 /LPF
NITRITE UR QL STRIP.AUTO: POSITIVE
PH UR STRIP: 6 [PH] (ref 5–9)
PROT UR STRIP-MCNC: 300 MG/DL
RBC #/AREA URNS HPF: >100 /HPF
SP GR UR REFRACTOMETRY: 1.01 (ref 1–1.02)
UROBILINOGEN UR QL STRIP.AUTO: 1 EU/DL (ref 0.2–1)
WBC URNS QL MICRO: NORMAL /HPF

## 2020-09-04 PROCEDURE — 400014 HH F/U

## 2020-09-04 PROCEDURE — 81015 MICROSCOPIC EXAM OF URINE: CPT

## 2020-09-04 PROCEDURE — 87086 URINE CULTURE/COLONY COUNT: CPT

## 2020-09-04 PROCEDURE — 3331090002 HH PPS REVENUE DEBIT

## 2020-09-04 PROCEDURE — G0299 HHS/HOSPICE OF RN EA 15 MIN: HCPCS

## 2020-09-04 PROCEDURE — 81001 URINALYSIS AUTO W/SCOPE: CPT

## 2020-09-04 PROCEDURE — 3331090001 HH PPS REVENUE CREDIT

## 2020-09-05 PROCEDURE — 3331090001 HH PPS REVENUE CREDIT

## 2020-09-05 PROCEDURE — 3331090002 HH PPS REVENUE DEBIT

## 2020-09-06 LAB
BACTERIA SPEC CULT: NORMAL
SERVICE CMNT-IMP: NORMAL

## 2020-09-06 PROCEDURE — 3331090002 HH PPS REVENUE DEBIT

## 2020-09-06 PROCEDURE — 3331090001 HH PPS REVENUE CREDIT

## 2020-09-07 PROCEDURE — 3331090001 HH PPS REVENUE CREDIT

## 2020-09-07 PROCEDURE — 3331090002 HH PPS REVENUE DEBIT

## 2020-09-08 PROCEDURE — 3331090001 HH PPS REVENUE CREDIT

## 2020-09-08 PROCEDURE — 3331090002 HH PPS REVENUE DEBIT

## 2020-09-09 ENCOUNTER — HOME CARE VISIT (OUTPATIENT)
Dept: SCHEDULING | Facility: HOME HEALTH | Age: 85
End: 2020-09-09
Payer: MEDICARE

## 2020-09-09 VITALS
HEART RATE: 72 BPM | SYSTOLIC BLOOD PRESSURE: 122 MMHG | RESPIRATION RATE: 18 BRPM | DIASTOLIC BLOOD PRESSURE: 64 MMHG | TEMPERATURE: 97.3 F | OXYGEN SATURATION: 96 %

## 2020-09-09 PROCEDURE — G0157 HHC PT ASSISTANT EA 15: HCPCS

## 2020-09-09 PROCEDURE — 3331090001 HH PPS REVENUE CREDIT

## 2020-09-09 PROCEDURE — 3331090002 HH PPS REVENUE DEBIT

## 2020-09-10 ENCOUNTER — HOME CARE VISIT (OUTPATIENT)
Dept: SCHEDULING | Facility: HOME HEALTH | Age: 85
End: 2020-09-10
Payer: MEDICARE

## 2020-09-10 VITALS
HEART RATE: 75 BPM | SYSTOLIC BLOOD PRESSURE: 124 MMHG | DIASTOLIC BLOOD PRESSURE: 60 MMHG | TEMPERATURE: 97.3 F | RESPIRATION RATE: 17 BRPM

## 2020-09-10 PROCEDURE — G0157 HHC PT ASSISTANT EA 15: HCPCS

## 2020-09-10 PROCEDURE — 3331090001 HH PPS REVENUE CREDIT

## 2020-09-10 PROCEDURE — 3331090002 HH PPS REVENUE DEBIT

## 2020-09-11 PROCEDURE — 3331090002 HH PPS REVENUE DEBIT

## 2020-09-11 PROCEDURE — 3331090001 HH PPS REVENUE CREDIT

## 2020-09-12 PROCEDURE — 3331090001 HH PPS REVENUE CREDIT

## 2020-09-12 PROCEDURE — 3331090002 HH PPS REVENUE DEBIT

## 2020-09-13 PROCEDURE — 3331090001 HH PPS REVENUE CREDIT

## 2020-09-13 PROCEDURE — 3331090002 HH PPS REVENUE DEBIT

## 2020-09-14 PROCEDURE — 3331090001 HH PPS REVENUE CREDIT

## 2020-09-14 PROCEDURE — 3331090002 HH PPS REVENUE DEBIT

## 2020-09-15 ENCOUNTER — HOME CARE VISIT (OUTPATIENT)
Dept: SCHEDULING | Facility: HOME HEALTH | Age: 85
End: 2020-09-15
Payer: MEDICARE

## 2020-09-15 VITALS
DIASTOLIC BLOOD PRESSURE: 64 MMHG | SYSTOLIC BLOOD PRESSURE: 124 MMHG | RESPIRATION RATE: 18 BRPM | TEMPERATURE: 97.2 F | HEART RATE: 71 BPM

## 2020-09-15 PROCEDURE — 3331090001 HH PPS REVENUE CREDIT

## 2020-09-15 PROCEDURE — G0157 HHC PT ASSISTANT EA 15: HCPCS

## 2020-09-15 PROCEDURE — 3331090002 HH PPS REVENUE DEBIT

## 2020-09-16 ENCOUNTER — HOME CARE VISIT (OUTPATIENT)
Dept: SCHEDULING | Facility: HOME HEALTH | Age: 85
End: 2020-09-16
Payer: MEDICARE

## 2020-09-16 PROCEDURE — A4333 URINARY CATH ANCHOR DEVICE: HCPCS

## 2020-09-16 PROCEDURE — 3331090001 HH PPS REVENUE CREDIT

## 2020-09-16 PROCEDURE — G0299 HHS/HOSPICE OF RN EA 15 MIN: HCPCS

## 2020-09-16 PROCEDURE — 3331090002 HH PPS REVENUE DEBIT

## 2020-09-17 ENCOUNTER — HOME CARE VISIT (OUTPATIENT)
Dept: SCHEDULING | Facility: HOME HEALTH | Age: 85
End: 2020-09-17
Payer: MEDICARE

## 2020-09-17 VITALS
HEART RATE: 71 BPM | RESPIRATION RATE: 17 BRPM | SYSTOLIC BLOOD PRESSURE: 124 MMHG | TEMPERATURE: 97.2 F | DIASTOLIC BLOOD PRESSURE: 66 MMHG

## 2020-09-17 PROCEDURE — 3331090002 HH PPS REVENUE DEBIT

## 2020-09-17 PROCEDURE — 3331090001 HH PPS REVENUE CREDIT

## 2020-09-17 PROCEDURE — G0157 HHC PT ASSISTANT EA 15: HCPCS

## 2020-09-18 VITALS
RESPIRATION RATE: 16 BRPM | HEART RATE: 66 BPM | SYSTOLIC BLOOD PRESSURE: 126 MMHG | DIASTOLIC BLOOD PRESSURE: 70 MMHG | TEMPERATURE: 98.1 F | OXYGEN SATURATION: 97 %

## 2020-09-18 PROCEDURE — 3331090002 HH PPS REVENUE DEBIT

## 2020-09-18 PROCEDURE — 3331090001 HH PPS REVENUE CREDIT

## 2020-09-19 PROCEDURE — 3331090002 HH PPS REVENUE DEBIT

## 2020-09-19 PROCEDURE — 3331090001 HH PPS REVENUE CREDIT

## 2020-09-20 PROCEDURE — 3331090001 HH PPS REVENUE CREDIT

## 2020-09-20 PROCEDURE — 3331090002 HH PPS REVENUE DEBIT

## 2020-09-21 PROCEDURE — 3331090002 HH PPS REVENUE DEBIT

## 2020-09-21 PROCEDURE — 3331090001 HH PPS REVENUE CREDIT

## 2020-09-22 ENCOUNTER — HOME CARE VISIT (OUTPATIENT)
Dept: SCHEDULING | Facility: HOME HEALTH | Age: 85
End: 2020-09-22
Payer: MEDICARE

## 2020-09-22 VITALS
SYSTOLIC BLOOD PRESSURE: 124 MMHG | DIASTOLIC BLOOD PRESSURE: 62 MMHG | HEART RATE: 74 BPM | RESPIRATION RATE: 17 BRPM | TEMPERATURE: 97.2 F

## 2020-09-22 PROCEDURE — 3331090002 HH PPS REVENUE DEBIT

## 2020-09-22 PROCEDURE — 3331090001 HH PPS REVENUE CREDIT

## 2020-09-22 PROCEDURE — G0157 HHC PT ASSISTANT EA 15: HCPCS

## 2020-09-23 PROCEDURE — 3331090002 HH PPS REVENUE DEBIT

## 2020-09-23 PROCEDURE — 3331090001 HH PPS REVENUE CREDIT

## 2020-09-24 ENCOUNTER — HOME CARE VISIT (OUTPATIENT)
Dept: SCHEDULING | Facility: HOME HEALTH | Age: 85
End: 2020-09-24
Payer: MEDICARE

## 2020-09-24 VITALS
SYSTOLIC BLOOD PRESSURE: 102 MMHG | TEMPERATURE: 98 F | HEART RATE: 76 BPM | RESPIRATION RATE: 16 BRPM | DIASTOLIC BLOOD PRESSURE: 62 MMHG

## 2020-09-24 PROCEDURE — 3331090002 HH PPS REVENUE DEBIT

## 2020-09-24 PROCEDURE — 3331090001 HH PPS REVENUE CREDIT

## 2020-09-24 PROCEDURE — G0157 HHC PT ASSISTANT EA 15: HCPCS

## 2020-09-25 PROCEDURE — 3331090002 HH PPS REVENUE DEBIT

## 2020-09-25 PROCEDURE — 3331090001 HH PPS REVENUE CREDIT

## 2020-09-26 PROCEDURE — 3331090002 HH PPS REVENUE DEBIT

## 2020-09-26 PROCEDURE — 3331090001 HH PPS REVENUE CREDIT

## 2020-09-27 PROCEDURE — 3331090002 HH PPS REVENUE DEBIT

## 2020-09-27 PROCEDURE — 3331090001 HH PPS REVENUE CREDIT

## 2020-09-28 PROCEDURE — 3331090001 HH PPS REVENUE CREDIT

## 2020-09-28 PROCEDURE — 3331090002 HH PPS REVENUE DEBIT

## 2020-09-29 ENCOUNTER — HOME CARE VISIT (OUTPATIENT)
Dept: SCHEDULING | Facility: HOME HEALTH | Age: 85
End: 2020-09-29
Payer: MEDICARE

## 2020-09-29 PROCEDURE — 3331090001 HH PPS REVENUE CREDIT

## 2020-09-29 PROCEDURE — 3331090002 HH PPS REVENUE DEBIT

## 2020-09-30 ENCOUNTER — HOME CARE VISIT (OUTPATIENT)
Dept: HOME HEALTH SERVICES | Facility: HOME HEALTH | Age: 85
End: 2020-09-30
Payer: MEDICARE

## 2020-09-30 PROCEDURE — 3331090002 HH PPS REVENUE DEBIT

## 2020-09-30 PROCEDURE — 3331090001 HH PPS REVENUE CREDIT

## 2020-10-01 PROCEDURE — 3331090002 HH PPS REVENUE DEBIT

## 2020-10-01 PROCEDURE — 3331090001 HH PPS REVENUE CREDIT

## 2020-10-02 PROCEDURE — 3331090001 HH PPS REVENUE CREDIT

## 2020-10-02 PROCEDURE — 3331090002 HH PPS REVENUE DEBIT

## 2020-10-12 ENCOUNTER — HOME HEALTH ADMISSION (OUTPATIENT)
Dept: HOME HEALTH SERVICES | Facility: HOME HEALTH | Age: 85
End: 2020-10-12
Payer: MEDICARE

## 2020-10-12 ENCOUNTER — HOME CARE VISIT (OUTPATIENT)
Dept: HOME HEALTH SERVICES | Facility: HOME HEALTH | Age: 85
End: 2020-10-12

## 2020-10-13 ENCOUNTER — HOME CARE VISIT (OUTPATIENT)
Dept: SCHEDULING | Facility: HOME HEALTH | Age: 85
End: 2020-10-13
Payer: MEDICARE

## 2020-10-13 PROCEDURE — 3331090002 HH PPS REVENUE DEBIT

## 2020-10-13 PROCEDURE — G0299 HHS/HOSPICE OF RN EA 15 MIN: HCPCS

## 2020-10-13 PROCEDURE — 400013 HH SOC

## 2020-10-13 PROCEDURE — 3331090001 HH PPS REVENUE CREDIT

## 2020-10-14 VITALS
HEART RATE: 72 BPM | SYSTOLIC BLOOD PRESSURE: 124 MMHG | DIASTOLIC BLOOD PRESSURE: 78 MMHG | OXYGEN SATURATION: 98 % | RESPIRATION RATE: 16 BRPM | TEMPERATURE: 98.2 F

## 2020-10-14 PROCEDURE — 3331090001 HH PPS REVENUE CREDIT

## 2020-10-14 PROCEDURE — 3331090002 HH PPS REVENUE DEBIT

## 2020-10-15 PROCEDURE — 3331090002 HH PPS REVENUE DEBIT

## 2020-10-15 PROCEDURE — 3331090001 HH PPS REVENUE CREDIT

## 2020-10-16 ENCOUNTER — HOME CARE VISIT (OUTPATIENT)
Dept: SCHEDULING | Facility: HOME HEALTH | Age: 85
End: 2020-10-16
Payer: MEDICARE

## 2020-10-16 ENCOUNTER — HOME CARE VISIT (OUTPATIENT)
Dept: HOME HEALTH SERVICES | Facility: HOME HEALTH | Age: 85
End: 2020-10-16
Payer: MEDICARE

## 2020-10-16 VITALS
RESPIRATION RATE: 16 BRPM | SYSTOLIC BLOOD PRESSURE: 110 MMHG | WEIGHT: 160 LBS | HEART RATE: 57 BPM | HEIGHT: 72 IN | TEMPERATURE: 97.5 F | BODY MASS INDEX: 21.67 KG/M2 | DIASTOLIC BLOOD PRESSURE: 63 MMHG | OXYGEN SATURATION: 99 %

## 2020-10-16 PROCEDURE — 3331090001 HH PPS REVENUE CREDIT

## 2020-10-16 PROCEDURE — 3331090002 HH PPS REVENUE DEBIT

## 2020-10-16 PROCEDURE — G0299 HHS/HOSPICE OF RN EA 15 MIN: HCPCS

## 2020-10-16 PROCEDURE — G0151 HHCP-SERV OF PT,EA 15 MIN: HCPCS

## 2020-10-17 VITALS
DIASTOLIC BLOOD PRESSURE: 70 MMHG | OXYGEN SATURATION: 99 % | TEMPERATURE: 98.1 F | RESPIRATION RATE: 16 BRPM | HEART RATE: 80 BPM | SYSTOLIC BLOOD PRESSURE: 120 MMHG

## 2020-10-17 PROCEDURE — 3331090002 HH PPS REVENUE DEBIT

## 2020-10-17 PROCEDURE — 3331090001 HH PPS REVENUE CREDIT

## 2020-10-18 PROCEDURE — 3331090002 HH PPS REVENUE DEBIT

## 2020-10-18 PROCEDURE — 3331090001 HH PPS REVENUE CREDIT

## 2020-10-19 PROCEDURE — 3331090002 HH PPS REVENUE DEBIT

## 2020-10-19 PROCEDURE — 3331090001 HH PPS REVENUE CREDIT

## 2020-10-20 ENCOUNTER — HOME CARE VISIT (OUTPATIENT)
Dept: SCHEDULING | Facility: HOME HEALTH | Age: 85
End: 2020-10-20
Payer: MEDICARE

## 2020-10-20 VITALS
TEMPERATURE: 97.3 F | DIASTOLIC BLOOD PRESSURE: 60 MMHG | SYSTOLIC BLOOD PRESSURE: 124 MMHG | HEART RATE: 74 BPM | OXYGEN SATURATION: 96 % | RESPIRATION RATE: 18 BRPM

## 2020-10-20 PROCEDURE — G0157 HHC PT ASSISTANT EA 15: HCPCS

## 2020-10-20 PROCEDURE — 3331090002 HH PPS REVENUE DEBIT

## 2020-10-20 PROCEDURE — G0299 HHS/HOSPICE OF RN EA 15 MIN: HCPCS

## 2020-10-20 PROCEDURE — 3331090001 HH PPS REVENUE CREDIT

## 2020-10-21 VITALS
RESPIRATION RATE: 15 BRPM | OXYGEN SATURATION: 98 % | TEMPERATURE: 98.3 F | SYSTOLIC BLOOD PRESSURE: 120 MMHG | HEART RATE: 78 BPM | DIASTOLIC BLOOD PRESSURE: 78 MMHG

## 2020-10-21 PROCEDURE — A4314 CATH W/DRAINAGE 2-WAY LATEX: HCPCS

## 2020-10-21 PROCEDURE — 3331090001 HH PPS REVENUE CREDIT

## 2020-10-21 PROCEDURE — A4657 SYRINGE W/WO NEEDLE: HCPCS

## 2020-10-21 PROCEDURE — 3331090002 HH PPS REVENUE DEBIT

## 2020-10-21 PROCEDURE — A4333 URINARY CATH ANCHOR DEVICE: HCPCS

## 2020-10-22 ENCOUNTER — HOME CARE VISIT (OUTPATIENT)
Dept: SCHEDULING | Facility: HOME HEALTH | Age: 85
End: 2020-10-22
Payer: MEDICARE

## 2020-10-22 VITALS
OXYGEN SATURATION: 97 % | HEART RATE: 78 BPM | SYSTOLIC BLOOD PRESSURE: 132 MMHG | DIASTOLIC BLOOD PRESSURE: 70 MMHG | TEMPERATURE: 97.5 F | RESPIRATION RATE: 17 BRPM

## 2020-10-22 VITALS
TEMPERATURE: 97.3 F | RESPIRATION RATE: 18 BRPM | OXYGEN SATURATION: 96 % | HEART RATE: 76 BPM | SYSTOLIC BLOOD PRESSURE: 124 MMHG | DIASTOLIC BLOOD PRESSURE: 60 MMHG

## 2020-10-22 PROCEDURE — 3331090002 HH PPS REVENUE DEBIT

## 2020-10-22 PROCEDURE — G0157 HHC PT ASSISTANT EA 15: HCPCS

## 2020-10-22 PROCEDURE — G0299 HHS/HOSPICE OF RN EA 15 MIN: HCPCS

## 2020-10-22 PROCEDURE — G0152 HHCP-SERV OF OT,EA 15 MIN: HCPCS

## 2020-10-22 PROCEDURE — 3331090001 HH PPS REVENUE CREDIT

## 2020-10-23 VITALS
HEART RATE: 80 BPM | DIASTOLIC BLOOD PRESSURE: 68 MMHG | RESPIRATION RATE: 16 BRPM | SYSTOLIC BLOOD PRESSURE: 122 MMHG | TEMPERATURE: 98.1 F

## 2020-10-23 PROCEDURE — 3331090002 HH PPS REVENUE DEBIT

## 2020-10-23 PROCEDURE — 3331090001 HH PPS REVENUE CREDIT

## 2020-10-24 PROCEDURE — 3331090002 HH PPS REVENUE DEBIT

## 2020-10-24 PROCEDURE — 3331090001 HH PPS REVENUE CREDIT

## 2020-10-25 PROCEDURE — 3331090001 HH PPS REVENUE CREDIT

## 2020-10-25 PROCEDURE — 3331090002 HH PPS REVENUE DEBIT

## 2020-10-26 PROCEDURE — 3331090002 HH PPS REVENUE DEBIT

## 2020-10-26 PROCEDURE — 3331090001 HH PPS REVENUE CREDIT

## 2020-10-27 ENCOUNTER — HOME CARE VISIT (OUTPATIENT)
Dept: SCHEDULING | Facility: HOME HEALTH | Age: 85
End: 2020-10-27
Payer: MEDICARE

## 2020-10-27 VITALS
HEART RATE: 71 BPM | DIASTOLIC BLOOD PRESSURE: 60 MMHG | OXYGEN SATURATION: 97 % | SYSTOLIC BLOOD PRESSURE: 124 MMHG | TEMPERATURE: 97.3 F | RESPIRATION RATE: 18 BRPM

## 2020-10-27 PROCEDURE — 3331090001 HH PPS REVENUE CREDIT

## 2020-10-27 PROCEDURE — 3331090002 HH PPS REVENUE DEBIT

## 2020-10-27 PROCEDURE — G0299 HHS/HOSPICE OF RN EA 15 MIN: HCPCS

## 2020-10-27 PROCEDURE — G0157 HHC PT ASSISTANT EA 15: HCPCS

## 2020-10-28 ENCOUNTER — HOME CARE VISIT (OUTPATIENT)
Dept: SCHEDULING | Facility: HOME HEALTH | Age: 85
End: 2020-10-28
Payer: MEDICARE

## 2020-10-28 VITALS
DIASTOLIC BLOOD PRESSURE: 72 MMHG | RESPIRATION RATE: 16 BRPM | TEMPERATURE: 98.1 F | SYSTOLIC BLOOD PRESSURE: 140 MMHG | HEART RATE: 70 BPM | OXYGEN SATURATION: 98 %

## 2020-10-28 VITALS — HEART RATE: 76 BPM | OXYGEN SATURATION: 100 % | TEMPERATURE: 97.9 F

## 2020-10-28 PROCEDURE — 3331090001 HH PPS REVENUE CREDIT

## 2020-10-28 PROCEDURE — G0158 HHC OT ASSISTANT EA 15: HCPCS

## 2020-10-28 PROCEDURE — 3331090002 HH PPS REVENUE DEBIT

## 2020-10-29 ENCOUNTER — HOME CARE VISIT (OUTPATIENT)
Dept: SCHEDULING | Facility: HOME HEALTH | Age: 85
End: 2020-10-29
Payer: MEDICARE

## 2020-10-29 VITALS
DIASTOLIC BLOOD PRESSURE: 62 MMHG | HEART RATE: 60 BPM | RESPIRATION RATE: 18 BRPM | SYSTOLIC BLOOD PRESSURE: 122 MMHG | TEMPERATURE: 97.2 F | OXYGEN SATURATION: 97 %

## 2020-10-29 VITALS
TEMPERATURE: 97.2 F | RESPIRATION RATE: 18 BRPM | DIASTOLIC BLOOD PRESSURE: 60 MMHG | HEART RATE: 72 BPM | OXYGEN SATURATION: 99 % | SYSTOLIC BLOOD PRESSURE: 142 MMHG

## 2020-10-29 PROCEDURE — 3331090002 HH PPS REVENUE DEBIT

## 2020-10-29 PROCEDURE — G0299 HHS/HOSPICE OF RN EA 15 MIN: HCPCS

## 2020-10-29 PROCEDURE — G0157 HHC PT ASSISTANT EA 15: HCPCS

## 2020-10-29 PROCEDURE — 3331090001 HH PPS REVENUE CREDIT

## 2020-10-30 PROCEDURE — 3331090001 HH PPS REVENUE CREDIT

## 2020-10-30 PROCEDURE — 3331090002 HH PPS REVENUE DEBIT

## 2020-10-31 PROCEDURE — 3331090001 HH PPS REVENUE CREDIT

## 2020-10-31 PROCEDURE — 3331090002 HH PPS REVENUE DEBIT

## 2020-11-01 PROCEDURE — 3331090002 HH PPS REVENUE DEBIT

## 2020-11-01 PROCEDURE — 3331090001 HH PPS REVENUE CREDIT

## 2020-11-02 PROCEDURE — 3331090001 HH PPS REVENUE CREDIT

## 2020-11-02 PROCEDURE — 3331090002 HH PPS REVENUE DEBIT

## 2020-11-03 ENCOUNTER — HOME CARE VISIT (OUTPATIENT)
Dept: HOME HEALTH SERVICES | Facility: HOME HEALTH | Age: 85
End: 2020-11-03
Payer: MEDICARE

## 2020-11-03 PROCEDURE — 3331090001 HH PPS REVENUE CREDIT

## 2020-11-03 PROCEDURE — 3331090002 HH PPS REVENUE DEBIT

## 2020-11-04 ENCOUNTER — HOME CARE VISIT (OUTPATIENT)
Dept: SCHEDULING | Facility: HOME HEALTH | Age: 85
End: 2020-11-04
Payer: MEDICARE

## 2020-11-04 VITALS
TEMPERATURE: 98.2 F | RESPIRATION RATE: 18 BRPM | SYSTOLIC BLOOD PRESSURE: 110 MMHG | HEART RATE: 62 BPM | DIASTOLIC BLOOD PRESSURE: 64 MMHG

## 2020-11-04 VITALS
HEART RATE: 80 BPM | DIASTOLIC BLOOD PRESSURE: 78 MMHG | TEMPERATURE: 98.4 F | OXYGEN SATURATION: 94 % | SYSTOLIC BLOOD PRESSURE: 120 MMHG

## 2020-11-04 PROCEDURE — 3331090002 HH PPS REVENUE DEBIT

## 2020-11-04 PROCEDURE — 3331090001 HH PPS REVENUE CREDIT

## 2020-11-04 PROCEDURE — G0151 HHCP-SERV OF PT,EA 15 MIN: HCPCS

## 2020-11-04 PROCEDURE — G0158 HHC OT ASSISTANT EA 15: HCPCS

## 2020-11-05 ENCOUNTER — HOME CARE VISIT (OUTPATIENT)
Dept: SCHEDULING | Facility: HOME HEALTH | Age: 85
End: 2020-11-05
Payer: MEDICARE

## 2020-11-05 VITALS
TEMPERATURE: 98 F | DIASTOLIC BLOOD PRESSURE: 74 MMHG | HEART RATE: 80 BPM | OXYGEN SATURATION: 98 % | RESPIRATION RATE: 16 BRPM | SYSTOLIC BLOOD PRESSURE: 126 MMHG

## 2020-11-05 PROCEDURE — G0299 HHS/HOSPICE OF RN EA 15 MIN: HCPCS

## 2020-11-05 PROCEDURE — 3331090001 HH PPS REVENUE CREDIT

## 2020-11-05 PROCEDURE — 3331090002 HH PPS REVENUE DEBIT

## 2020-11-06 PROCEDURE — 3331090002 HH PPS REVENUE DEBIT

## 2020-11-06 PROCEDURE — 3331090001 HH PPS REVENUE CREDIT

## 2020-11-07 PROCEDURE — 3331090001 HH PPS REVENUE CREDIT

## 2020-11-07 PROCEDURE — 3331090002 HH PPS REVENUE DEBIT

## 2020-11-08 PROCEDURE — 3331090001 HH PPS REVENUE CREDIT

## 2020-11-08 PROCEDURE — 3331090002 HH PPS REVENUE DEBIT

## 2020-11-09 PROCEDURE — 3331090001 HH PPS REVENUE CREDIT

## 2020-11-09 PROCEDURE — 3331090002 HH PPS REVENUE DEBIT

## 2020-11-10 ENCOUNTER — HOME CARE VISIT (OUTPATIENT)
Dept: SCHEDULING | Facility: HOME HEALTH | Age: 85
End: 2020-11-10
Payer: MEDICARE

## 2020-11-10 VITALS
HEART RATE: 72 BPM | OXYGEN SATURATION: 99 % | DIASTOLIC BLOOD PRESSURE: 68 MMHG | SYSTOLIC BLOOD PRESSURE: 120 MMHG | TEMPERATURE: 98.4 F

## 2020-11-10 VITALS
TEMPERATURE: 98.2 F | SYSTOLIC BLOOD PRESSURE: 130 MMHG | HEART RATE: 66 BPM | RESPIRATION RATE: 14 BRPM | DIASTOLIC BLOOD PRESSURE: 62 MMHG

## 2020-11-10 PROCEDURE — G0151 HHCP-SERV OF PT,EA 15 MIN: HCPCS

## 2020-11-10 PROCEDURE — 3331090002 HH PPS REVENUE DEBIT

## 2020-11-10 PROCEDURE — G0299 HHS/HOSPICE OF RN EA 15 MIN: HCPCS

## 2020-11-10 PROCEDURE — G0158 HHC OT ASSISTANT EA 15: HCPCS

## 2020-11-10 PROCEDURE — 3331090001 HH PPS REVENUE CREDIT

## 2020-11-11 VITALS
DIASTOLIC BLOOD PRESSURE: 68 MMHG | RESPIRATION RATE: 16 BRPM | OXYGEN SATURATION: 99 % | SYSTOLIC BLOOD PRESSURE: 120 MMHG | TEMPERATURE: 98.4 F | HEART RATE: 72 BPM

## 2020-11-11 PROCEDURE — 3331090002 HH PPS REVENUE DEBIT

## 2020-11-11 PROCEDURE — 3331090001 HH PPS REVENUE CREDIT

## 2020-11-12 ENCOUNTER — HOME CARE VISIT (OUTPATIENT)
Dept: SCHEDULING | Facility: HOME HEALTH | Age: 85
End: 2020-11-12
Payer: MEDICARE

## 2020-11-12 VITALS
TEMPERATURE: 98.1 F | HEART RATE: 78 BPM | DIASTOLIC BLOOD PRESSURE: 78 MMHG | SYSTOLIC BLOOD PRESSURE: 124 MMHG | RESPIRATION RATE: 16 BRPM

## 2020-11-12 VITALS
DIASTOLIC BLOOD PRESSURE: 78 MMHG | RESPIRATION RATE: 16 BRPM | HEART RATE: 78 BPM | SYSTOLIC BLOOD PRESSURE: 124 MMHG | TEMPERATURE: 98.1 F | OXYGEN SATURATION: 97 %

## 2020-11-12 PROCEDURE — A4314 CATH W/DRAINAGE 2-WAY LATEX: HCPCS

## 2020-11-12 PROCEDURE — G0151 HHCP-SERV OF PT,EA 15 MIN: HCPCS

## 2020-11-12 PROCEDURE — 3331090001 HH PPS REVENUE CREDIT

## 2020-11-12 PROCEDURE — 3331090002 HH PPS REVENUE DEBIT

## 2020-11-12 PROCEDURE — 400013 HH SOC

## 2020-11-12 PROCEDURE — G0299 HHS/HOSPICE OF RN EA 15 MIN: HCPCS

## 2020-11-13 PROCEDURE — 3331090002 HH PPS REVENUE DEBIT

## 2020-11-13 PROCEDURE — 3331090001 HH PPS REVENUE CREDIT

## 2020-11-14 PROCEDURE — 3331090001 HH PPS REVENUE CREDIT

## 2020-11-14 PROCEDURE — 3331090002 HH PPS REVENUE DEBIT

## 2020-11-15 PROCEDURE — 3331090002 HH PPS REVENUE DEBIT

## 2020-11-15 PROCEDURE — 3331090001 HH PPS REVENUE CREDIT

## 2020-11-16 PROCEDURE — 3331090002 HH PPS REVENUE DEBIT

## 2020-11-16 PROCEDURE — 3331090001 HH PPS REVENUE CREDIT

## 2020-11-17 ENCOUNTER — HOME CARE VISIT (OUTPATIENT)
Dept: SCHEDULING | Facility: HOME HEALTH | Age: 85
End: 2020-11-17
Payer: MEDICARE

## 2020-11-17 VITALS
RESPIRATION RATE: 16 BRPM | HEART RATE: 64 BPM | DIASTOLIC BLOOD PRESSURE: 78 MMHG | TEMPERATURE: 98.1 F | SYSTOLIC BLOOD PRESSURE: 122 MMHG

## 2020-11-17 PROCEDURE — 3331090002 HH PPS REVENUE DEBIT

## 2020-11-17 PROCEDURE — 3331090001 HH PPS REVENUE CREDIT

## 2020-11-17 PROCEDURE — G0152 HHCP-SERV OF OT,EA 15 MIN: HCPCS

## 2020-11-18 ENCOUNTER — HOME CARE VISIT (OUTPATIENT)
Dept: SCHEDULING | Facility: HOME HEALTH | Age: 85
End: 2020-11-18
Payer: MEDICARE

## 2020-11-18 VITALS
TEMPERATURE: 97.4 F | DIASTOLIC BLOOD PRESSURE: 76 MMHG | SYSTOLIC BLOOD PRESSURE: 124 MMHG | HEART RATE: 80 BPM | RESPIRATION RATE: 16 BRPM

## 2020-11-18 PROCEDURE — 3331090001 HH PPS REVENUE CREDIT

## 2020-11-18 PROCEDURE — G0151 HHCP-SERV OF PT,EA 15 MIN: HCPCS

## 2020-11-18 PROCEDURE — 3331090002 HH PPS REVENUE DEBIT

## 2020-11-19 ENCOUNTER — HOME CARE VISIT (OUTPATIENT)
Dept: SCHEDULING | Facility: HOME HEALTH | Age: 85
End: 2020-11-19
Payer: MEDICARE

## 2020-11-19 VITALS
TEMPERATURE: 98.7 F | HEART RATE: 78 BPM | SYSTOLIC BLOOD PRESSURE: 130 MMHG | RESPIRATION RATE: 17 BRPM | OXYGEN SATURATION: 98 % | DIASTOLIC BLOOD PRESSURE: 76 MMHG

## 2020-11-19 PROCEDURE — 3331090002 HH PPS REVENUE DEBIT

## 2020-11-19 PROCEDURE — G0299 HHS/HOSPICE OF RN EA 15 MIN: HCPCS

## 2020-11-19 PROCEDURE — 3331090001 HH PPS REVENUE CREDIT

## 2020-11-20 ENCOUNTER — HOME CARE VISIT (OUTPATIENT)
Dept: SCHEDULING | Facility: HOME HEALTH | Age: 85
End: 2020-11-20
Payer: MEDICARE

## 2020-11-20 VITALS
DIASTOLIC BLOOD PRESSURE: 60 MMHG | TEMPERATURE: 97.9 F | RESPIRATION RATE: 16 BRPM | SYSTOLIC BLOOD PRESSURE: 126 MMHG | HEART RATE: 72 BPM

## 2020-11-20 PROCEDURE — 3331090001 HH PPS REVENUE CREDIT

## 2020-11-20 PROCEDURE — 3331090002 HH PPS REVENUE DEBIT

## 2020-11-20 PROCEDURE — G0151 HHCP-SERV OF PT,EA 15 MIN: HCPCS

## 2020-11-21 PROCEDURE — 3331090001 HH PPS REVENUE CREDIT

## 2020-11-21 PROCEDURE — 3331090002 HH PPS REVENUE DEBIT

## 2020-11-22 PROCEDURE — 3331090001 HH PPS REVENUE CREDIT

## 2020-11-22 PROCEDURE — 3331090002 HH PPS REVENUE DEBIT

## 2020-11-23 ENCOUNTER — HOME CARE VISIT (OUTPATIENT)
Dept: SCHEDULING | Facility: HOME HEALTH | Age: 85
End: 2020-11-23
Payer: MEDICARE

## 2020-11-23 PROCEDURE — 3331090002 HH PPS REVENUE DEBIT

## 2020-11-23 PROCEDURE — 3331090001 HH PPS REVENUE CREDIT

## 2020-11-24 ENCOUNTER — HOME CARE VISIT (OUTPATIENT)
Dept: HOME HEALTH SERVICES | Facility: HOME HEALTH | Age: 85
End: 2020-11-24
Payer: MEDICARE

## 2020-11-24 PROCEDURE — 3331090002 HH PPS REVENUE DEBIT

## 2020-11-24 PROCEDURE — 3331090001 HH PPS REVENUE CREDIT

## 2020-11-25 PROCEDURE — 3331090001 HH PPS REVENUE CREDIT

## 2020-11-25 PROCEDURE — 3331090002 HH PPS REVENUE DEBIT

## 2020-11-26 PROCEDURE — 3331090002 HH PPS REVENUE DEBIT

## 2020-11-26 PROCEDURE — 3331090001 HH PPS REVENUE CREDIT

## 2020-11-27 PROCEDURE — 3331090001 HH PPS REVENUE CREDIT

## 2020-11-27 PROCEDURE — 3331090002 HH PPS REVENUE DEBIT

## 2020-11-28 PROCEDURE — 3331090002 HH PPS REVENUE DEBIT

## 2020-11-28 PROCEDURE — 3331090001 HH PPS REVENUE CREDIT

## 2020-11-29 PROCEDURE — 3331090001 HH PPS REVENUE CREDIT

## 2020-11-29 PROCEDURE — 3331090002 HH PPS REVENUE DEBIT

## 2020-11-30 ENCOUNTER — HOME CARE VISIT (OUTPATIENT)
Dept: HOME HEALTH SERVICES | Facility: HOME HEALTH | Age: 85
End: 2020-11-30
Payer: MEDICARE

## 2020-11-30 PROCEDURE — 3331090002 HH PPS REVENUE DEBIT

## 2020-11-30 PROCEDURE — 3331090001 HH PPS REVENUE CREDIT

## 2020-12-01 PROCEDURE — 3331090002 HH PPS REVENUE DEBIT

## 2020-12-01 PROCEDURE — 3331090001 HH PPS REVENUE CREDIT

## 2020-12-02 PROCEDURE — 3331090002 HH PPS REVENUE DEBIT

## 2020-12-02 PROCEDURE — 3331090001 HH PPS REVENUE CREDIT

## 2020-12-03 PROCEDURE — 3331090002 HH PPS REVENUE DEBIT

## 2020-12-03 PROCEDURE — 3331090001 HH PPS REVENUE CREDIT

## 2020-12-04 PROCEDURE — 3331090002 HH PPS REVENUE DEBIT

## 2020-12-04 PROCEDURE — 3331090001 HH PPS REVENUE CREDIT

## 2020-12-05 PROCEDURE — 3331090001 HH PPS REVENUE CREDIT

## 2020-12-05 PROCEDURE — 3331090002 HH PPS REVENUE DEBIT

## 2020-12-06 PROCEDURE — 3331090001 HH PPS REVENUE CREDIT

## 2020-12-06 PROCEDURE — 3331090002 HH PPS REVENUE DEBIT

## 2020-12-07 PROCEDURE — 3331090001 HH PPS REVENUE CREDIT

## 2020-12-07 PROCEDURE — 3331090002 HH PPS REVENUE DEBIT

## 2020-12-08 ENCOUNTER — HOME CARE VISIT (OUTPATIENT)
Dept: HOME HEALTH SERVICES | Facility: HOME HEALTH | Age: 85
End: 2020-12-08
Payer: MEDICARE

## 2020-12-08 PROCEDURE — 3331090002 HH PPS REVENUE DEBIT

## 2020-12-08 PROCEDURE — 3331090001 HH PPS REVENUE CREDIT
